# Patient Record
Sex: FEMALE | Race: BLACK OR AFRICAN AMERICAN | NOT HISPANIC OR LATINO | Employment: FULL TIME | URBAN - METROPOLITAN AREA
[De-identification: names, ages, dates, MRNs, and addresses within clinical notes are randomized per-mention and may not be internally consistent; named-entity substitution may affect disease eponyms.]

---

## 2017-05-08 ENCOUNTER — HOSPITAL ENCOUNTER (EMERGENCY)
Facility: HOSPITAL | Age: 22
Discharge: HOME | End: 2017-05-09
Attending: EMERGENCY MEDICINE
Payer: MEDICARE

## 2017-05-08 VITALS
WEIGHT: 125 LBS | HEART RATE: 75 BPM | SYSTOLIC BLOOD PRESSURE: 114 MMHG | RESPIRATION RATE: 18 BRPM | OXYGEN SATURATION: 100 % | TEMPERATURE: 98 F | DIASTOLIC BLOOD PRESSURE: 62 MMHG

## 2017-05-08 DIAGNOSIS — N39.0 UTI (URINARY TRACT INFECTION): Primary | ICD-10-CM

## 2017-05-08 LAB
BILIRUB UR QL STRIP: NEGATIVE
CLARITY UR: CLEAR
COLOR UR: YELLOW
COLOR, POC: NORMAL
GLUCOSE UR STRIP-MCNC: NEGATIVE MG/DL
HCG UR QL: NORMAL
HGB UR QL STRIP.AUTO: ABNORMAL
KETONES UR STRIP-MCNC: NEGATIVE MG/DL
LEUKOCYTE ESTERASE UR QL STRIP: ABNORMAL
NITRITE UR QL STRIP: NEGATIVE
PH UR STRIP.AUTO: 6 [PH] (ref 4.5–8)
PROT UR STRIP-MCNC: NEGATIVE MG/DL
SP GR UR STRIP.AUTO: >=1.03 (ref 1–1.03)
UROBILINOGEN UR QL STRIP.AUTO: 1 E.U./DL

## 2017-05-08 PROCEDURE — 81002 URINALYSIS NONAUTO W/O SCOPE: CPT | Performed by: EMERGENCY MEDICINE

## 2017-05-08 PROCEDURE — 81025 URINE PREGNANCY TEST: CPT | Performed by: EMERGENCY MEDICINE

## 2017-05-09 LAB
BACTERIA UR QL AUTO: ABNORMAL /HPF
HYALINE CASTS #/AREA URNS LPF: ABNORMAL /LPF
NON-SQ EPI CELLS URNS QL MICRO: ABNORMAL /HPF
RBC #/AREA URNS AUTO: ABNORMAL /HPF
WBC #/AREA URNS AUTO: ABNORMAL /HPF

## 2017-05-09 PROCEDURE — 81001 URINALYSIS AUTO W/SCOPE: CPT

## 2017-05-09 PROCEDURE — 87086 URINE CULTURE/COLONY COUNT: CPT

## 2017-05-09 PROCEDURE — 99283 EMERGENCY DEPT VISIT LOW MDM: CPT

## 2017-05-09 PROCEDURE — 87077 CULTURE AEROBIC IDENTIFY: CPT

## 2017-05-09 PROCEDURE — 87186 SC STD MICRODIL/AGAR DIL: CPT

## 2017-05-09 RX ORDER — CEPHALEXIN 500 MG/1
500 CAPSULE ORAL 4 TIMES DAILY
Qty: 20 CAPSULE | Refills: 0 | Status: SHIPPED | OUTPATIENT
Start: 2017-05-09 | End: 2017-05-14

## 2017-05-11 ENCOUNTER — HOSPITAL ENCOUNTER (EMERGENCY)
Facility: HOSPITAL | Age: 22
Discharge: HOME | End: 2017-05-12
Attending: EMERGENCY MEDICINE
Payer: MEDICARE

## 2017-05-11 VITALS
DIASTOLIC BLOOD PRESSURE: 81 MMHG | TEMPERATURE: 97.5 F | SYSTOLIC BLOOD PRESSURE: 135 MMHG | HEART RATE: 107 BPM | RESPIRATION RATE: 18 BRPM | OXYGEN SATURATION: 100 % | WEIGHT: 124 LBS

## 2017-05-11 DIAGNOSIS — S61.411A LACERATION OF RIGHT HAND: Primary | ICD-10-CM

## 2017-05-11 LAB — BACTERIA UR CULT: NORMAL

## 2017-05-11 RX ORDER — LIDOCAINE HYDROCHLORIDE 10 MG/ML
10 INJECTION, SOLUTION EPIDURAL; INFILTRATION; INTRACAUDAL; PERINEURAL ONCE
Status: COMPLETED | OUTPATIENT
Start: 2017-05-12 | End: 2017-05-12

## 2017-05-11 RX ORDER — BACITRACIN, NEOMYCIN, POLYMYXIN B 400; 3.5; 5 [USP'U]/G; MG/G; [USP'U]/G
1 OINTMENT TOPICAL ONCE
Status: COMPLETED | OUTPATIENT
Start: 2017-05-12 | End: 2017-05-12

## 2017-05-11 RX ORDER — NAPROXEN 500 MG/1
500 TABLET ORAL ONCE
Status: COMPLETED | OUTPATIENT
Start: 2017-05-12 | End: 2017-05-12

## 2017-05-12 ENCOUNTER — APPOINTMENT (EMERGENCY)
Dept: RADIOLOGY | Facility: HOSPITAL | Age: 22
End: 2017-05-12
Payer: MEDICARE

## 2017-05-12 ENCOUNTER — HOSPITAL ENCOUNTER (EMERGENCY)
Facility: HOSPITAL | Age: 22
Discharge: HOME | End: 2017-05-12
Attending: EMERGENCY MEDICINE | Admitting: EMERGENCY MEDICINE
Payer: MEDICARE

## 2017-05-12 VITALS
SYSTOLIC BLOOD PRESSURE: 116 MMHG | DIASTOLIC BLOOD PRESSURE: 72 MMHG | RESPIRATION RATE: 16 BRPM | HEART RATE: 77 BPM | TEMPERATURE: 98.4 F | OXYGEN SATURATION: 98 %

## 2017-05-12 DIAGNOSIS — Z51.89 VISIT FOR WOUND CHECK: Primary | ICD-10-CM

## 2017-05-12 PROCEDURE — 99284 EMERGENCY DEPT VISIT MOD MDM: CPT

## 2017-05-12 PROCEDURE — 73130 X-RAY EXAM OF HAND: CPT

## 2017-05-12 PROCEDURE — 99282 EMERGENCY DEPT VISIT SF MDM: CPT

## 2017-05-12 RX ORDER — NAPROXEN 500 MG/1
500 TABLET ORAL 2 TIMES DAILY WITH MEALS
Qty: 20 TABLET | Refills: 0 | Status: SHIPPED | OUTPATIENT
Start: 2017-05-12 | End: 2017-06-29 | Stop reason: ALTCHOICE

## 2017-05-12 RX ORDER — CEPHALEXIN 500 MG/1
500 CAPSULE ORAL 4 TIMES DAILY
Qty: 20 CAPSULE | Refills: 0 | Status: SHIPPED | OUTPATIENT
Start: 2017-05-12 | End: 2017-05-17

## 2017-05-12 RX ADMIN — BACITRACIN, NEOMYCIN, POLYMYXIN B 1 SMALL APPLICATION: 400; 3.5; 5 OINTMENT TOPICAL at 01:14

## 2017-05-12 RX ADMIN — NAPROXEN 500 MG: 500 TABLET ORAL at 01:15

## 2017-05-12 RX ADMIN — LIDOCAINE HYDROCHLORIDE 10 ML: 10 INJECTION, SOLUTION EPIDURAL; INFILTRATION; INTRACAUDAL; PERINEURAL at 00:20

## 2017-06-02 ENCOUNTER — HOSPITAL ENCOUNTER (EMERGENCY)
Facility: HOSPITAL | Age: 22
Discharge: HOME | End: 2017-06-02
Admitting: EMERGENCY MEDICINE
Payer: COMMERCIAL

## 2017-06-02 VITALS
WEIGHT: 124 LBS | HEART RATE: 73 BPM | HEIGHT: 61 IN | DIASTOLIC BLOOD PRESSURE: 60 MMHG | TEMPERATURE: 97.1 F | RESPIRATION RATE: 16 BRPM | SYSTOLIC BLOOD PRESSURE: 111 MMHG | OXYGEN SATURATION: 99 % | BODY MASS INDEX: 23.41 KG/M2

## 2017-06-02 DIAGNOSIS — Z48.02 VISIT FOR SUTURE REMOVAL: Primary | ICD-10-CM

## 2017-06-02 PROCEDURE — 99281 EMR DPT VST MAYX REQ PHY/QHP: CPT

## 2017-06-29 ENCOUNTER — HOSPITAL ENCOUNTER (EMERGENCY)
Facility: HOSPITAL | Age: 22
Discharge: HOME | End: 2017-06-29
Admitting: EMERGENCY MEDICINE
Payer: COMMERCIAL

## 2017-06-29 VITALS
TEMPERATURE: 97.4 F | DIASTOLIC BLOOD PRESSURE: 80 MMHG | WEIGHT: 120 LBS | SYSTOLIC BLOOD PRESSURE: 116 MMHG | RESPIRATION RATE: 16 BRPM | BODY MASS INDEX: 22.67 KG/M2 | OXYGEN SATURATION: 98 % | HEART RATE: 86 BPM

## 2017-06-29 DIAGNOSIS — R19.7 VOMITING AND DIARRHEA: Primary | ICD-10-CM

## 2017-06-29 DIAGNOSIS — R11.10 VOMITING AND DIARRHEA: Primary | ICD-10-CM

## 2017-06-29 LAB
ALBUMIN SERPL BCP-MCNC: 4.2 G/DL (ref 3.5–5)
ALP SERPL-CCNC: 47 U/L (ref 46–116)
ALT SERPL W P-5'-P-CCNC: 18 U/L (ref 12–78)
ANION GAP SERPL CALCULATED.3IONS-SCNC: 6 MMOL/L (ref 4–13)
AST SERPL W P-5'-P-CCNC: 12 U/L (ref 5–45)
BACTERIA UR QL AUTO: ABNORMAL /HPF
BASOPHILS # BLD AUTO: 0.03 THOUSANDS/ΜL (ref 0–0.1)
BASOPHILS NFR BLD AUTO: 0 % (ref 0–1)
BILIRUB SERPL-MCNC: 0.42 MG/DL (ref 0.2–1)
BILIRUB UR QL STRIP: ABNORMAL
BUN SERPL-MCNC: 11 MG/DL (ref 5–25)
CALCIUM SERPL-MCNC: 9.5 MG/DL (ref 8.3–10.1)
CHLORIDE SERPL-SCNC: 106 MMOL/L (ref 100–108)
CLARITY UR: ABNORMAL
CO2 SERPL-SCNC: 26 MMOL/L (ref 21–32)
COLOR UR: YELLOW
COLOR, POC: NORMAL
CREAT SERPL-MCNC: 0.95 MG/DL (ref 0.6–1.3)
EOSINOPHIL # BLD AUTO: 0.12 THOUSAND/ΜL (ref 0–0.61)
EOSINOPHIL NFR BLD AUTO: 2 % (ref 0–6)
ERYTHROCYTE [DISTWIDTH] IN BLOOD BY AUTOMATED COUNT: 12.2 % (ref 11.6–15.1)
GFR SERPL CREATININE-BSD FRML MDRD: >60 ML/MIN/1.73SQ M
GLUCOSE SERPL-MCNC: 86 MG/DL (ref 65–140)
GLUCOSE UR STRIP-MCNC: NEGATIVE MG/DL
HCG UR QL: NEGATIVE
HCT VFR BLD AUTO: 39 % (ref 34.8–46.1)
HGB BLD-MCNC: 13.5 G/DL (ref 11.5–15.4)
HGB UR QL STRIP.AUTO: ABNORMAL
HYALINE CASTS #/AREA URNS LPF: ABNORMAL /LPF
KETONES UR STRIP-MCNC: NEGATIVE MG/DL
LEUKOCYTE ESTERASE UR QL STRIP: NEGATIVE
LIPASE SERPL-CCNC: 83 U/L (ref 73–393)
LYMPHOCYTES # BLD AUTO: 1.78 THOUSANDS/ΜL (ref 0.6–4.47)
LYMPHOCYTES NFR BLD AUTO: 24 % (ref 14–44)
MCH RBC QN AUTO: 30.3 PG (ref 26.8–34.3)
MCHC RBC AUTO-ENTMCNC: 34.6 G/DL (ref 31.4–37.4)
MCV RBC AUTO: 88 FL (ref 82–98)
MONOCYTES # BLD AUTO: 0.62 THOUSAND/ΜL (ref 0.17–1.22)
MONOCYTES NFR BLD AUTO: 8 % (ref 4–12)
NEUTROPHILS # BLD AUTO: 4.8 THOUSANDS/ΜL (ref 1.85–7.62)
NEUTS SEG NFR BLD AUTO: 66 % (ref 43–75)
NITRITE UR QL STRIP: NEGATIVE
NON-SQ EPI CELLS URNS QL MICRO: ABNORMAL /HPF
NRBC BLD AUTO-RTO: 0 /100 WBCS
PH UR STRIP.AUTO: 5.5 [PH] (ref 4.5–8)
PLATELET # BLD AUTO: 283 THOUSANDS/UL (ref 149–390)
PMV BLD AUTO: 10.6 FL (ref 8.9–12.7)
POTASSIUM SERPL-SCNC: 3.6 MMOL/L (ref 3.5–5.3)
PROT SERPL-MCNC: 8.8 G/DL (ref 6.4–8.2)
PROT UR STRIP-MCNC: ABNORMAL MG/DL
RBC # BLD AUTO: 4.45 MILLION/UL (ref 3.81–5.12)
RBC #/AREA URNS AUTO: ABNORMAL /HPF
SODIUM SERPL-SCNC: 138 MMOL/L (ref 136–145)
SP GR UR STRIP.AUTO: >=1.03 (ref 1–1.03)
UROBILINOGEN UR QL STRIP.AUTO: 0.2 E.U./DL
WBC # BLD AUTO: 7.37 THOUSAND/UL (ref 4.31–10.16)
WBC #/AREA URNS AUTO: ABNORMAL /HPF

## 2017-06-29 PROCEDURE — 83690 ASSAY OF LIPASE: CPT | Performed by: PHYSICIAN ASSISTANT

## 2017-06-29 PROCEDURE — 36415 COLL VENOUS BLD VENIPUNCTURE: CPT | Performed by: PHYSICIAN ASSISTANT

## 2017-06-29 PROCEDURE — 99283 EMERGENCY DEPT VISIT LOW MDM: CPT

## 2017-06-29 PROCEDURE — 96361 HYDRATE IV INFUSION ADD-ON: CPT

## 2017-06-29 PROCEDURE — 81001 URINALYSIS AUTO W/SCOPE: CPT

## 2017-06-29 PROCEDURE — 96374 THER/PROPH/DIAG INJ IV PUSH: CPT

## 2017-06-29 PROCEDURE — 81002 URINALYSIS NONAUTO W/O SCOPE: CPT | Performed by: PHYSICIAN ASSISTANT

## 2017-06-29 PROCEDURE — 80053 COMPREHEN METABOLIC PANEL: CPT | Performed by: PHYSICIAN ASSISTANT

## 2017-06-29 PROCEDURE — 81025 URINE PREGNANCY TEST: CPT | Performed by: PHYSICIAN ASSISTANT

## 2017-06-29 PROCEDURE — 85025 COMPLETE CBC W/AUTO DIFF WBC: CPT | Performed by: PHYSICIAN ASSISTANT

## 2017-06-29 RX ORDER — ONDANSETRON 2 MG/ML
4 INJECTION INTRAMUSCULAR; INTRAVENOUS ONCE
Status: COMPLETED | OUTPATIENT
Start: 2017-06-29 | End: 2017-06-29

## 2017-06-29 RX ORDER — ONDANSETRON 4 MG/1
4 TABLET, ORALLY DISINTEGRATING ORAL EVERY 6 HOURS PRN
Qty: 5 TABLET | Refills: 0 | Status: SHIPPED | OUTPATIENT
Start: 2017-06-29 | End: 2017-08-03

## 2017-06-29 RX ADMIN — ONDANSETRON 4 MG: 2 INJECTION INTRAMUSCULAR; INTRAVENOUS at 12:17

## 2017-06-29 RX ADMIN — SODIUM CHLORIDE 500 ML: 0.9 INJECTION, SOLUTION INTRAVENOUS at 12:14

## 2017-08-03 ENCOUNTER — HOSPITAL ENCOUNTER (EMERGENCY)
Facility: HOSPITAL | Age: 22
Discharge: HOME | End: 2017-08-03
Attending: EMERGENCY MEDICINE | Admitting: EMERGENCY MEDICINE
Payer: COMMERCIAL

## 2017-08-03 VITALS
BODY MASS INDEX: 22.73 KG/M2 | WEIGHT: 120.37 LBS | HEIGHT: 61 IN | HEART RATE: 70 BPM | RESPIRATION RATE: 18 BRPM | SYSTOLIC BLOOD PRESSURE: 113 MMHG | OXYGEN SATURATION: 99 % | TEMPERATURE: 98.1 F | DIASTOLIC BLOOD PRESSURE: 70 MMHG

## 2017-08-03 DIAGNOSIS — O20.0 THREATENED MISCARRIAGE: Primary | ICD-10-CM

## 2017-08-03 DIAGNOSIS — O20.0 THREATENED ABORTION: ICD-10-CM

## 2017-08-03 LAB — B-HCG SERPL-ACNC: 3926 MIU/ML

## 2017-08-03 PROCEDURE — 36415 COLL VENOUS BLD VENIPUNCTURE: CPT | Performed by: EMERGENCY MEDICINE

## 2017-08-03 PROCEDURE — 99284 EMERGENCY DEPT VISIT MOD MDM: CPT

## 2017-08-03 PROCEDURE — 84702 CHORIONIC GONADOTROPIN TEST: CPT | Performed by: EMERGENCY MEDICINE

## 2017-08-07 ENCOUNTER — GENERIC CONVERSION - ENCOUNTER (OUTPATIENT)
Dept: OTHER | Facility: OTHER | Age: 22
End: 2017-08-07

## 2017-08-10 ENCOUNTER — GENERIC CONVERSION - ENCOUNTER (OUTPATIENT)
Dept: OTHER | Facility: OTHER | Age: 22
End: 2017-08-10

## 2017-08-16 ENCOUNTER — APPOINTMENT (OUTPATIENT)
Dept: LAB | Facility: CLINIC | Age: 22
End: 2017-08-16
Payer: COMMERCIAL

## 2017-08-16 DIAGNOSIS — O20.0 THREATENED ABORTION: ICD-10-CM

## 2017-08-16 PROCEDURE — 84702 CHORIONIC GONADOTROPIN TEST: CPT

## 2017-08-16 PROCEDURE — 36415 COLL VENOUS BLD VENIPUNCTURE: CPT

## 2017-08-18 ENCOUNTER — TRANSCRIBE ORDERS (OUTPATIENT)
Dept: LAB | Facility: CLINIC | Age: 22
End: 2017-08-18

## 2017-08-18 ENCOUNTER — LAB REQUISITION (OUTPATIENT)
Dept: LAB | Facility: HOSPITAL | Age: 22
End: 2017-08-18
Payer: COMMERCIAL

## 2017-08-18 ENCOUNTER — APPOINTMENT (OUTPATIENT)
Dept: LAB | Facility: CLINIC | Age: 22
End: 2017-08-18
Payer: COMMERCIAL

## 2017-08-18 DIAGNOSIS — O20.0 THREATENED ABORTION: ICD-10-CM

## 2017-08-18 DIAGNOSIS — O20.0 ABORTION, THREATENED: Primary | ICD-10-CM

## 2017-08-18 DIAGNOSIS — Z20.01 CONTACT WITH OR EXPOSURE TO ESCHERICHIA COLI (E. COLI): Primary | ICD-10-CM

## 2017-08-18 LAB
ABO GROUP BLD: NORMAL
B-HCG SERPL-ACNC: 546 MIU/ML
BLD GP AB SCN SERPL QL: POSITIVE
RH BLD: NEGATIVE
SPECIMEN EXPIRATION DATE: NORMAL

## 2017-08-18 PROCEDURE — 86900 BLOOD TYPING SEROLOGIC ABO: CPT | Performed by: NURSE PRACTITIONER

## 2017-08-18 PROCEDURE — 86850 RBC ANTIBODY SCREEN: CPT | Performed by: NURSE PRACTITIONER

## 2017-08-18 PROCEDURE — 84702 CHORIONIC GONADOTROPIN TEST: CPT

## 2017-08-18 PROCEDURE — 36415 COLL VENOUS BLD VENIPUNCTURE: CPT

## 2017-08-18 PROCEDURE — 86870 RBC ANTIBODY IDENTIFICATION: CPT

## 2017-08-18 PROCEDURE — 86901 BLOOD TYPING SEROLOGIC RH(D): CPT | Performed by: NURSE PRACTITIONER

## 2017-08-21 ENCOUNTER — GENERIC CONVERSION - ENCOUNTER (OUTPATIENT)
Dept: OTHER | Facility: OTHER | Age: 22
End: 2017-08-21

## 2017-08-23 ENCOUNTER — GENERIC CONVERSION - ENCOUNTER (OUTPATIENT)
Dept: OTHER | Facility: OTHER | Age: 22
End: 2017-08-23

## 2017-08-23 LAB — BLOOD GROUP ANTIBODIES SERPL: NORMAL

## 2018-01-10 NOTE — MISCELLANEOUS
To Whom it May Concern: The above named patient is following with us for her care  Please forward records to us from her most recent Emergency Room appointment on August 7th  Thank you for your prompt attention in this matter      Sincerely,    [facility], OB-Gyn

## 2018-01-15 NOTE — RESULT NOTES
Verified Results  (1) HCG QUANT [id number] 09:49AM Laquita Lopez     Test Name Result Flag Reference   HCG QUANTITATIVE 546 mIU/mL H <=6   Expected Ranges:     Approximate               Approximate HCG  Gestation age          Concentration ( mIU/mL)  _____________          ______________________   April [facility]                      HCG values  0 2-1                       5-50  1-2                           2-3                         100-5000  3-4                         [id number]  4-5                         1000-75832  5-6                         [id number]  6-8                         [id number]  8-12                        [id number]

## 2018-01-22 VITALS
DIASTOLIC BLOOD PRESSURE: 70 MMHG | WEIGHT: 123.56 LBS | SYSTOLIC BLOOD PRESSURE: 118 MMHG | HEART RATE: 94 BPM | BODY MASS INDEX: 23.35 KG/M2

## 2018-02-27 ENCOUNTER — APPOINTMENT (OUTPATIENT)
Dept: LAB | Facility: CLINIC | Age: 23
End: 2018-02-27
Payer: COMMERCIAL

## 2018-02-27 ENCOUNTER — TRANSCRIBE ORDERS (OUTPATIENT)
Dept: LAB | Facility: CLINIC | Age: 23
End: 2018-02-27

## 2018-02-27 DIAGNOSIS — I10 ESSENTIAL HYPERTENSION, MALIGNANT: Primary | ICD-10-CM

## 2018-02-27 DIAGNOSIS — N91.2 ABSENCE OF MENSTRUATION: ICD-10-CM

## 2018-02-27 LAB
B-HCG SERPL-ACNC: 8031 MIU/ML
PROGEST SERPL-MCNC: 10.9 NG/ML

## 2018-02-27 PROCEDURE — 84144 ASSAY OF PROGESTERONE: CPT

## 2018-02-27 PROCEDURE — 36415 COLL VENOUS BLD VENIPUNCTURE: CPT

## 2018-02-27 PROCEDURE — 84702 CHORIONIC GONADOTROPIN TEST: CPT

## 2018-03-21 ENCOUNTER — INITIAL PRENATAL (OUTPATIENT)
Dept: OBGYN CLINIC | Facility: CLINIC | Age: 23
End: 2018-03-21
Payer: COMMERCIAL

## 2018-03-21 VITALS
HEART RATE: 80 BPM | HEIGHT: 61 IN | SYSTOLIC BLOOD PRESSURE: 109 MMHG | DIASTOLIC BLOOD PRESSURE: 75 MMHG | BODY MASS INDEX: 22.77 KG/M2 | WEIGHT: 120.6 LBS

## 2018-03-21 DIAGNOSIS — Z34.01 ENCOUNTER FOR SUPERVISION OF NORMAL FIRST PREGNANCY, FIRST TRIMESTER: Primary | ICD-10-CM

## 2018-03-21 PROCEDURE — 99213 OFFICE O/P EST LOW 20 MIN: CPT | Performed by: OBSTETRICS & GYNECOLOGY

## 2018-03-21 RX ORDER — PNV NO.95/FERROUS FUM/FOLIC AC 28MG-0.8MG
1 TABLET ORAL DAILY
COMMUNITY
End: 2020-04-14 | Stop reason: SDUPTHER

## 2018-07-05 ENCOUNTER — HOSPITAL ENCOUNTER (EMERGENCY)
Age: 23
Discharge: HOME | End: 2018-07-05
Attending: EMERGENCY MEDICINE
Payer: COMMERCIAL

## 2018-07-05 VITALS
HEIGHT: 62 IN | HEART RATE: 57 BPM | SYSTOLIC BLOOD PRESSURE: 100 MMHG | OXYGEN SATURATION: 100 % | TEMPERATURE: 97.9 F | RESPIRATION RATE: 16 BRPM | WEIGHT: 113.06 LBS | BODY MASS INDEX: 20.8 KG/M2 | DIASTOLIC BLOOD PRESSURE: 60 MMHG

## 2018-07-05 DIAGNOSIS — A59.9 TRICHOMONAS INFECTION: ICD-10-CM

## 2018-07-05 DIAGNOSIS — N39.0 URINARY TRACT INFECTION WITHOUT HEMATURIA, SITE UNSPECIFIED: Primary | ICD-10-CM

## 2018-07-05 LAB
-: ABNORMAL
AMORPHOUS: ABNORMAL
BACTERIA: ABNORMAL
BILIRUBIN URINE: NEGATIVE
CASTS UA: ABNORMAL /LPF
COLOR: YELLOW
COMMENT UA: ABNORMAL
CRYSTALS, UA: ABNORMAL /HPF
EPITHELIAL CELLS UA: ABNORMAL /HPF (ref 0–5)
GLUCOSE URINE: NEGATIVE
KETONES, URINE: NEGATIVE
LEUKOCYTE ESTERASE, URINE: ABNORMAL
MUCUS: ABNORMAL
NITRITE, URINE: NEGATIVE
OTHER OBSERVATIONS UA: ABNORMAL
PH UA: 6 (ref 5–8)
PROTEIN UA: NEGATIVE
RBC UA: ABNORMAL /HPF (ref 0–2)
RENAL EPITHELIAL, UA: ABNORMAL /HPF
SPECIFIC GRAVITY UA: 1.03 (ref 1–1.03)
TRICHOMONAS: POSITIVE
TURBIDITY: ABNORMAL
URINE HGB: NEGATIVE
UROBILINOGEN, URINE: NORMAL
WBC UA: ABNORMAL /HPF (ref 0–5)
YEAST: ABNORMAL

## 2018-07-05 PROCEDURE — 81001 URINALYSIS AUTO W/SCOPE: CPT

## 2018-07-05 PROCEDURE — 84703 CHORIONIC GONADOTROPIN ASSAY: CPT

## 2018-07-05 PROCEDURE — 99283 EMERGENCY DEPT VISIT LOW MDM: CPT

## 2018-07-05 RX ORDER — SULFAMETHOXAZOLE AND TRIMETHOPRIM 800; 160 MG/1; MG/1
1 TABLET ORAL 2 TIMES DAILY
Qty: 20 TABLET | Refills: 0 | Status: SHIPPED | OUTPATIENT
Start: 2018-07-05 | End: 2018-07-15

## 2018-07-05 RX ORDER — METRONIDAZOLE 500 MG/1
500 TABLET ORAL 3 TIMES DAILY
Qty: 21 TABLET | Refills: 0 | Status: SHIPPED | OUTPATIENT
Start: 2018-07-05 | End: 2018-07-12

## 2018-07-05 ASSESSMENT — ENCOUNTER SYMPTOMS
EYES NEGATIVE: 1
RESPIRATORY NEGATIVE: 1
ALLERGIC/IMMUNOLOGIC NEGATIVE: 1
GASTROINTESTINAL NEGATIVE: 1

## 2018-07-05 ASSESSMENT — PAIN SCALES - GENERAL: PAINLEVEL_OUTOF10: 10

## 2018-07-05 ASSESSMENT — PAIN DESCRIPTION - PAIN TYPE: TYPE: ACUTE PAIN

## 2018-07-05 ASSESSMENT — PAIN DESCRIPTION - DESCRIPTORS: DESCRIPTORS: SHARP;STABBING

## 2018-07-05 ASSESSMENT — PAIN DESCRIPTION - LOCATION: LOCATION: FLANK

## 2018-07-05 ASSESSMENT — PAIN DESCRIPTION - ORIENTATION: ORIENTATION: LEFT

## 2018-07-06 LAB — HCG, PREGNANCY URINE (POC): NEGATIVE

## 2018-07-06 NOTE — ED NOTES
Patient presents to ED via private auto c/o left flank pain, lower ab pain and burning when urinating onset 3 days. Afebrile on arrival. VS stable, NAD will continue to monitor.      Alba Lee RN  07/05/18 2424

## 2018-07-06 NOTE — ED PROVIDER NOTES
[address] [place] [address] ED  eMERGENCY dEPARTMENT eNCOUnter      Pt Name: Lea Alvarado  MRN: [id number]  [place] 1995  Date of evaluation: 2018  Provider: Gennaro Cardoza MD    CHIEF COMPLAINT       Chief Complaint   Patient presents with    Dysuria     past 3 days    Flank Pain     lt flank pain past 3 days(hx pyelonephritis)         HISTORY OF PRESENT ILLNESS   (Location/Symptom, Timing/Onset, Context/Setting, Quality, Duration, Modifying Factors, Severity)  Note limiting factors. Lea Alvarado is a 25 y.o. female who presents to the emergency department With complaints of flank pain and increased frequency of micturition. Patient had chills. No fever, no nausea, vomiting, had increased frequency of micturition and burning micturition. No chest pain, no discharge per vagina. No swelling     HPI    Nursing Notes were reviewed. REVIEW OF SYSTEMS    (2-9 systems for level 4, 10 or more for level 5)     Review of Systems   Constitutional: Positive for chills. HENT: Negative. Eyes: Negative. Respiratory: Negative. Cardiovascular: Negative. Gastrointestinal: Negative. Endocrine: Negative. Genitourinary: Positive for dysuria, frequency and urgency. Negative for vaginal discharge. Musculoskeletal: Negative. Skin: Negative. Allergic/Immunologic: Negative. Neurological: Negative. Hematological: Negative. Psychiatric/Behavioral: Negative. All other systems reviewed and are negative. Except as noted above the remainder of the review of systems was reviewed and negative.        PAST MEDICAL HISTORY     Past Medical History:   Diagnosis Date    Allergic     codeine- anaphylaxis    allergy     codeine- anaphylaxis    Pyelonephritis 3/14/2015    Pyelonephritis          SURGICAL HISTORY       Past Surgical History:   Procedure Laterality Date     SECTION      TYMPANOSTOMY TUBE PLACEMENT           CURRENT MEDICATIONS       Previous Medications 97999  [phone]      As needed        Final Impression:   1. Urinary tract infection without hematuria, site unspecified    2.  Trichomonas infection               (Please note that portions of this note were completed with a voice recognition program.  Efforts were made to edit the dictations but occasionally words are mis-transcribed.)    Jose Leung MD (electronically signed)  Attending Emergency Physician            Jose Leung MD  07/05/18 7190

## 2019-01-30 ENCOUNTER — HOSPITAL ENCOUNTER (EMERGENCY)
Age: 24
Discharge: HOME | End: 2019-01-30
Attending: SPECIALIST
Payer: COMMERCIAL

## 2019-01-30 VITALS
OXYGEN SATURATION: 98 % | WEIGHT: 115 LBS | RESPIRATION RATE: 16 BRPM | SYSTOLIC BLOOD PRESSURE: 116 MMHG | TEMPERATURE: 98.1 F | DIASTOLIC BLOOD PRESSURE: 54 MMHG | HEIGHT: 62 IN | BODY MASS INDEX: 21.16 KG/M2 | HEART RATE: 64 BPM

## 2019-01-30 DIAGNOSIS — H66.92 ACUTE LEFT OTITIS MEDIA: Primary | ICD-10-CM

## 2019-01-30 PROCEDURE — 6370000000 HC RX 637 (ALT 250 FOR IP): Performed by: SPECIALIST

## 2019-01-30 PROCEDURE — 99282 EMERGENCY DEPT VISIT SF MDM: CPT

## 2019-01-30 RX ORDER — AMOXICILLIN 250 MG/1
500 CAPSULE ORAL ONCE
Status: COMPLETED | OUTPATIENT
Start: 2019-01-30 | End: 2019-01-30

## 2019-01-30 RX ORDER — IBUPROFEN 600 MG/1
600 TABLET ORAL ONCE
Status: COMPLETED | OUTPATIENT
Start: 2019-01-30 | End: 2019-01-30

## 2019-01-30 RX ORDER — AMOXICILLIN 500 MG/1
500 CAPSULE ORAL 3 TIMES DAILY
Qty: 30 CAPSULE | Refills: 0 | Status: SHIPPED | OUTPATIENT
Start: 2019-01-30 | End: 2019-02-09

## 2019-01-30 RX ORDER — IBUPROFEN 600 MG/1
600 TABLET ORAL EVERY 8 HOURS PRN
Qty: 20 TABLET | Refills: 0 | Status: SHIPPED | OUTPATIENT
Start: 2019-01-30 | End: 2019-04-12

## 2019-01-30 RX ADMIN — IBUPROFEN 600 MG: 600 TABLET ORAL at 07:45

## 2019-01-30 RX ADMIN — AMOXICILLIN 500 MG: 250 CAPSULE ORAL at 07:45

## 2019-01-30 ASSESSMENT — ENCOUNTER SYMPTOMS
SORE THROAT: 1
TROUBLE SWALLOWING: 1
SHORTNESS OF BREATH: 0
DIARRHEA: 0
COUGH: 0
NAUSEA: 0
ABDOMINAL PAIN: 0

## 2019-01-30 ASSESSMENT — PAIN SCALES - GENERAL
PAINLEVEL_OUTOF10: 7
PAINLEVEL_OUTOF10: 7

## 2019-01-30 ASSESSMENT — PAIN DESCRIPTION - LOCATION: LOCATION: EAR

## 2019-01-30 ASSESSMENT — PAIN DESCRIPTION - ORIENTATION: ORIENTATION: LEFT

## 2019-01-30 ASSESSMENT — PAIN DESCRIPTION - PROGRESSION: CLINICAL_PROGRESSION: NOT CHANGED

## 2019-01-31 ENCOUNTER — CARE COORDINATION (OUTPATIENT)
Dept: CARE COORDINATION | Age: 24
End: 2019-01-31

## 2019-03-14 ENCOUNTER — HOSPITAL ENCOUNTER (EMERGENCY)
Age: 24
Discharge: HOME | End: 2019-03-14
Attending: EMERGENCY MEDICINE
Payer: COMMERCIAL

## 2019-03-14 VITALS
DIASTOLIC BLOOD PRESSURE: 76 MMHG | OXYGEN SATURATION: 100 % | HEART RATE: 74 BPM | BODY MASS INDEX: 21.35 KG/M2 | WEIGHT: 116 LBS | HEIGHT: 62 IN | SYSTOLIC BLOOD PRESSURE: 123 MMHG | TEMPERATURE: 98.6 F | RESPIRATION RATE: 18 BRPM

## 2019-03-14 DIAGNOSIS — N39.0 URINARY TRACT INFECTION WITHOUT HEMATURIA, SITE UNSPECIFIED: Primary | ICD-10-CM

## 2019-03-14 LAB
-: ABNORMAL
AMORPHOUS: ABNORMAL
BACTERIA: ABNORMAL
BILIRUBIN URINE: NEGATIVE
CASTS UA: ABNORMAL /LPF
COLOR: YELLOW
COMMENT UA: ABNORMAL
CRYSTALS, UA: ABNORMAL /HPF
EPITHELIAL CELLS UA: ABNORMAL /HPF (ref 0–5)
GLUCOSE URINE: NEGATIVE
HCG(URINE) PREGNANCY TEST: NEGATIVE
KETONES, URINE: NEGATIVE
LEUKOCYTE ESTERASE, URINE: ABNORMAL
MUCUS: ABNORMAL
NITRITE, URINE: NEGATIVE
OTHER OBSERVATIONS UA: ABNORMAL
PH UA: 7 (ref 5–8)
PROTEIN UA: NEGATIVE
RBC UA: ABNORMAL /HPF (ref 0–2)
RENAL EPITHELIAL, UA: ABNORMAL /HPF
SPECIFIC GRAVITY UA: 1.02 (ref 1–1.03)
TRICHOMONAS: ABNORMAL
TURBIDITY: CLEAR
URINE HGB: NEGATIVE
UROBILINOGEN, URINE: NORMAL
WBC UA: ABNORMAL /HPF (ref 0–5)
YEAST: ABNORMAL

## 2019-03-14 PROCEDURE — 84703 CHORIONIC GONADOTROPIN ASSAY: CPT

## 2019-03-14 PROCEDURE — 99283 EMERGENCY DEPT VISIT LOW MDM: CPT

## 2019-03-14 PROCEDURE — 81001 URINALYSIS AUTO W/SCOPE: CPT

## 2019-03-14 PROCEDURE — 87086 URINE CULTURE/COLONY COUNT: CPT

## 2019-03-14 RX ORDER — SULFAMETHOXAZOLE AND TRIMETHOPRIM 800; 160 MG/1; MG/1
1 TABLET ORAL 2 TIMES DAILY
Qty: 20 TABLET | Refills: 0 | Status: SHIPPED | OUTPATIENT
Start: 2019-03-14 | End: 2019-03-24

## 2019-03-14 ASSESSMENT — ENCOUNTER SYMPTOMS
SHORTNESS OF BREATH: 0
BACK PAIN: 1
COUGH: 0
DIARRHEA: 0
EYE DISCHARGE: 0
VOMITING: 1
EYE REDNESS: 0
SORE THROAT: 0
ABDOMINAL PAIN: 1

## 2019-03-14 ASSESSMENT — PAIN DESCRIPTION - LOCATION: LOCATION: ABDOMEN;BACK

## 2019-03-14 ASSESSMENT — PAIN DESCRIPTION - DESCRIPTORS: DESCRIPTORS: SQUEEZING

## 2019-03-14 ASSESSMENT — PAIN SCALES - GENERAL: PAINLEVEL_OUTOF10: 4

## 2019-03-14 ASSESSMENT — PAIN DESCRIPTION - PAIN TYPE: TYPE: ACUTE PAIN

## 2019-03-15 ENCOUNTER — CARE COORDINATION (OUTPATIENT)
Dept: CARE COORDINATION | Age: 24
End: 2019-03-15

## 2019-03-16 LAB
CULTURE: NORMAL
Lab: NORMAL
SPECIMEN DESCRIPTION: NORMAL

## 2019-04-12 ENCOUNTER — HOSPITAL ENCOUNTER (EMERGENCY)
Age: 24
Discharge: HOME | End: 2019-04-12
Attending: EMERGENCY MEDICINE
Payer: COMMERCIAL

## 2019-04-12 VITALS
RESPIRATION RATE: 16 BRPM | SYSTOLIC BLOOD PRESSURE: 110 MMHG | OXYGEN SATURATION: 100 % | HEART RATE: 88 BPM | BODY MASS INDEX: 21.16 KG/M2 | WEIGHT: 115 LBS | TEMPERATURE: 98.2 F | HEIGHT: 62 IN | DIASTOLIC BLOOD PRESSURE: 68 MMHG

## 2019-04-12 DIAGNOSIS — J02.9 ACUTE PHARYNGITIS, UNSPECIFIED ETIOLOGY: Primary | ICD-10-CM

## 2019-04-12 LAB
DIRECT EXAM: NORMAL
DIRECT EXAM: NORMAL
Lab: NORMAL
Lab: NORMAL
SPECIMEN DESCRIPTION: NORMAL
SPECIMEN DESCRIPTION: NORMAL

## 2019-04-12 PROCEDURE — 87651 STREP A DNA AMP PROBE: CPT

## 2019-04-12 PROCEDURE — 99283 EMERGENCY DEPT VISIT LOW MDM: CPT

## 2019-04-12 RX ORDER — PREDNISONE 10 MG/1
TABLET ORAL
Qty: 20 TABLET | Refills: 0 | Status: SHIPPED | OUTPATIENT
Start: 2019-04-12 | End: 2019-04-22

## 2019-04-12 ASSESSMENT — PAIN DESCRIPTION - PAIN TYPE: TYPE: ACUTE PAIN

## 2019-04-12 ASSESSMENT — PAIN DESCRIPTION - LOCATION: LOCATION: THROAT

## 2019-04-12 ASSESSMENT — PAIN SCALES - GENERAL: PAINLEVEL_OUTOF10: 5

## 2019-04-12 NOTE — ED PROVIDER NOTES
[facility] ED  [address] [address]  Phone: [phone]  Fax: [phone]      Pt Name: Carlita Lazo  CMP:[id number]  [place] 1995  Date of evaluation: 2019      CHIEF COMPLAINT       Chief Complaint   Patient presents with    Pharyngitis       HISTORY OF PRESENT ILLNESS    27-year-old female presents to the emergency department today complaining of a three-day history of sore throat. Pain radiates to both ears. She doesn't think she's had fever or chills. No nasal congestion or cough. No known sick contacts. Swallowing makes the pain worse. Nothing makes pain better. There's been no other content redness evaluation or management of these symptoms prior to arrival.     REVIEWOF SYSTEMS     Review of Systems   All other systems reviewed and are negative. PAST MEDICAL HISTORY    has a past medical history of Allergic, allergy, Pyelonephritis, and Pyelonephritis. SURGICAL HISTORY      has a past surgical history that includes Tympanostomy tube placement and  section. CURRENTMEDICATIONS       Previous Medications    No medications on file       ALLERGIES     has No Known Allergies. FAMILY HISTORY     indicated that her mother is alive. She indicated that her father is alive. She indicated that her brother is alive. family history is not on file. SOCIAL HISTORY      reports that she has never smoked. She has never used smokeless tobacco. She reports that she does not drink alcohol or use drugs. PHYSICAL EXAM     INITIAL VITALS:  height is 5' 2\" (1.575 m) and weight is 52.2 kg (115 lb). Her oral temperature is 98.2 °F (36.8 °C). Her blood pressure is 110/68 and her pulse is 88. Her respiration is 16 and oxygen saturation is 100%. Physical Exam   Constitutional: She is oriented to person, place, and time. She appears well-developed and well-nourished. No distress. HENT:   Head: Normocephalic and atraumatic.    Right Ear: External ear normal.   Left Ear: External ear normal.   Mouth/Throat: Oropharynx is clear and moist. No oropharyngeal exudate. Eyes: Pupils are equal, round, and reactive to light. Conjunctivae and EOM are normal.   Neck: Normal range of motion. Neck supple. No tracheal deviation present. Cardiovascular: Normal rate, regular rhythm and intact distal pulses. Pulmonary/Chest: Effort normal and breath sounds normal. No respiratory distress. Musculoskeletal: Normal range of motion. She exhibits no edema or tenderness. Neurological: She is alert and oriented to person, place, and time. Skin: Skin is warm and dry. Psychiatric: She has a normal mood and affect. Her behavior is normal. Judgment and thought content normal.   Vitals reviewed. MDM:   Throat is normal-appearing to me. Patient is managing her airway and secretions without difficulty. Results for orders placed or performed during the hospital encounter of 04/12/19   Strep Screen Group A Throat   Result Value Ref Range    Specimen Description . THROAT     Special Requests NOT REPORTED     Direct Exam       Rapid Strep A negative. A negative Rapid Group A Strep Screen result does not rule out the possibility of Group A Streptococci in the specimen. A Group A Strep DNA test is available upon request.       I will treat this patient supportively. Work note has been written. The patient was given the following medications:  Orders Placed This Encounter   Medications    predniSONE (DELTASONE) 10 MG tablet     Sig: Take 4 tablets by mouth once daily for 5 days     Dispense:  20 tablet     Refill:  0          FINAL IMPRESSION      1.  Acute pharyngitis, unspecified etiology          DISPOSITION/PLAN   DISPOSITION Decision To Discharge 04/12/2019 07:13:12 AM      Condition on Disposition  Good    PATIENT REFERRED TO:  Shawna Yoo DO  [address] [place] [address] 22.  [phone]    Schedule an appointment as soon as possible for a visit in 1 week        DISCHARGE MEDICATIONS:  New Prescriptions    PREDNISONE (DELTASONE) 10 MG TABLET    Take 4 tablets by mouth once daily for 5 days       (Please note that portions of this note werecompleted with a voice recognition program.  Efforts were made to edit the dictations but occasionally words are mis-transcribed.)    Danielle Amos MD, F.A.C.E.P, F.A.A.E.M  Emergency Physician Attending          Danielle Amos MD  04/12/19 0376

## 2019-04-12 NOTE — ED NOTES
PATIENT TO TREATMENT ROOM AMBULATES WITHOUT DIFFICULTY, GAIT SLOW AND STEADY, RESP = AND NON LABORED. SPELLING OF FIRST AND LAST NAME AND  VERIFIED WITH PATIENT. PATIENT PRESENTS TO ED WITH C/O SORE THROAT X 2 DAYS WITH DIFFICULTY SWALLOWING. PT C/O PAIN RADIATING INTO BILAT EARS. NO FEVER.  DENIES COUGH  SEE [address] [facility] Neetuelroy Crowley RN  [phone]

## 2019-05-04 ENCOUNTER — HOSPITAL ENCOUNTER (EMERGENCY)
Age: 24
Discharge: HOME | End: 2019-05-04
Attending: EMERGENCY MEDICINE
Payer: COMMERCIAL

## 2019-05-04 VITALS
TEMPERATURE: 98.4 F | DIASTOLIC BLOOD PRESSURE: 73 MMHG | OXYGEN SATURATION: 97 % | HEIGHT: 62 IN | RESPIRATION RATE: 20 BRPM | SYSTOLIC BLOOD PRESSURE: 115 MMHG | HEART RATE: 71 BPM | BODY MASS INDEX: 21.71 KG/M2 | WEIGHT: 118 LBS

## 2019-05-04 DIAGNOSIS — K02.9 PAIN DUE TO DENTAL CARIES: Primary | ICD-10-CM

## 2019-05-04 PROCEDURE — 99282 EMERGENCY DEPT VISIT SF MDM: CPT

## 2019-05-04 RX ORDER — PENICILLIN V POTASSIUM 500 MG/1
500 TABLET ORAL 4 TIMES DAILY
Qty: 28 TABLET | Refills: 0 | Status: SHIPPED | OUTPATIENT
Start: 2019-05-04 | End: 2019-05-11

## 2019-05-04 ASSESSMENT — PAIN DESCRIPTION - ORIENTATION: ORIENTATION: RIGHT;UPPER

## 2019-05-04 ASSESSMENT — PAIN DESCRIPTION - LOCATION: LOCATION: JAW

## 2019-05-04 ASSESSMENT — PAIN DESCRIPTION - FREQUENCY: FREQUENCY: CONTINUOUS

## 2019-05-04 ASSESSMENT — PAIN DESCRIPTION - PAIN TYPE: TYPE: ACUTE PAIN

## 2019-05-04 ASSESSMENT — PAIN DESCRIPTION - DESCRIPTORS: DESCRIPTORS: THROBBING

## 2019-05-04 ASSESSMENT — PAIN SCALES - GENERAL: PAINLEVEL_OUTOF10: 8

## 2019-05-04 NOTE — ED PROVIDER NOTES
[facility] ED  [address] [facility] [address]  Phone: [phone]  Fax: [phone]      eMERGENCY dEPARTMENT eNCOUnter      Pt Name: Naomy Hamm  MRN: [id number]  [place] 1995  Date of evaluation: 2019  Provider: Mary Mims PA-C    CHIEF COMPLAINT       Chief Complaint   Patient presents with    Dental Pain           HISTORY OF PRESENT ILLNESS  (Location/Symptom, Timing/Onset, Context/Setting, Quality, Duration, Modifying Factors, Severity.)   Naomy Hamm is a 26 y.o. female who presents to the emergency department for evaluation of DENTAL PAIN. Context/timeframe:   24-hour pain of known  Tooth with dental caries/previous fracture. Patient denies any new trauma or dental fracture prompting this pain. She denies any facial edema/fever/chills/nausea/vomiting/neck pain/headache. Patient states she does see [facility].  She denies any other symptoms. Known caries and/or fractures? YES  Facial swelling? NO  Fever? NO  Vomiting? NO  Neck pain? NO      Nursing Notes were reviewed. REVIEW OF SYSTEMS    (2-9 systems for level 4, 10 or more for level 5)     Review of Systems   Constitutional: Negative. HENT:  Per HPI  Eyes: Negative. Respiratory: Negative. Cardiovascular: Negative. Gastrointestinal: Negative. Musculoskeletal: Negative. Neck: Per HPI  Endocrine: Negative. Genitourinary: Negative. Skin: Negative. Allergic/Immunologic: Negative. Neurological: Negative. Hematological: Negative. Psychiatric/Behavioral: Negative. Except as noted above the remainder of the review of systems was reviewed and negative.        PAST MEDICAL HISTORY         Diagnosis Date    Allergic     codeine- anaphylaxis    allergy     codeine- anaphylaxis    Pyelonephritis 3/14/2015    Pyelonephritis          SURGICAL HISTORY           Procedure Laterality Date     SECTION      TYMPANOSTOMY TUBE PLACEMENT         CURRENT MEDICATIONS Previous Medications    No medications on file       ALLERGIES     Patient has no known allergies. FAMILY HISTORY     History reviewed. No pertinent family history. Family Status   Relation Name Status    Mother  Alive    Father  Alive    Brother  Alive        SOCIAL HISTORY      reports that she has never smoked. She has never used smokeless tobacco. She reports that she does not drink alcohol or use drugs. PHYSICAL EXAM    (up to 7 for level 4, 8 or more for level 5)     ED Triage Vitals [05/04/19 1826]   BP Temp Temp Source Pulse Resp SpO2 Height Weight   115/73 98.4 °F (36.9 °C) Oral 71 20 97 % 5' 2\" (1.575 m) 118 lb (53.5 kg)       Physical Exam   Nursing note and vitals reviewed. Constitutional:  well-developed, well-nourished  Head: Normocephalic and atraumatic. Ear: External ears normal.   Throat/Oral:  #2 dental fractures with mild caries. No oral abscess/bleeding/discharge appreciated. No facial/jaw edema. Nose: Nose normal and midline. No meningismus. Eyes: Conjunctivae, normal. Pupils are equal, round. Neck: No lymphadenopathy. Normal range of motion. Cardiovascular: Normal rate, regular rhythm, normal heart sounds  Pulmonary/Chest: Effort normal and breath sounds normal.  Musculoskeletal: Normal to inspection. Neurological: Alert and appropriate mentation  Skin: Skin is warm and dry. No rash noted. No erythema.    Psychiatric: Mood, memory, affect and judgment normal.         DIAGNOSTIC RESULTS         RADIOLOGY:   Non-plain film images such as CT, Ultrasound and MRI are read by the radiologist. Plain radiographic images are visualized and preliminarily interpreted by the emergency physician with the below findings:    Not indicated    Interpretation per the Radiologist below, if available at the time of this note:    No orders to display           LABS:  Labs Reviewed - No data to display  Not indicated    All other labs were within normal range or not returned as of this dictation. EMERGENCY DEPARTMENT COURSE and DIFFERENTIAL DIAGNOSIS/MDM:   Vitals:    Vitals:    05/04/19 1826   BP: 115/73   Pulse: 71   Resp: 20   Temp: 98.4 °F (36.9 °C)   TempSrc: Oral   SpO2: 97%   Weight: 53.5 kg (118 lb)   Height: 5' 2\" (1.575 m)     [phone]  PCN rx provided, Motrin/Tylenol recommended. CONSULTS:  None    PROCEDURES:  None    I have reviewed the disposition diagnosis with the patient and or their family/guardian. I have answered their questions and given discharge instructions. They voiced understanding of these instructions and did not have any further questions or complaints. FINAL IMPRESSION      1. Pain due to dental caries          DISPOSITION/PLAN   DISPOSITION Decision To Discharge    PATIENT REFERRED TO:  No follow-up provider specified.     DISCHARGE MEDICATIONS:  New Prescriptions    PENICILLIN V POTASSIUM (VEETID) 500 MG TABLET    Take 1 tablet by mouth 4 times daily for 7 days       (Please note that portions of this note were completed with a voice recognition program.  Efforts were made to edit the dictations but occasionally words are mis-transcribed.)    CIRO Molina PA-C  05/04/19 7610

## 2019-05-04 NOTE — ED PROVIDER NOTES
[address] [facility] ED  eMERGENCY dEPARTMENT eNCOUnter   Attending Attestation     Pt Name: Swati Dorado  MRN: [id number]  [place] 1995  Date of evaluation: 5/4/19       Swati Dorado is a 26 y.o. female who presents with Dental Pain      History: Complaining of dental pain. She has a dentist to follow-up with. Exam: Patient is nontoxic in appearance. She has poor dentition and dental cavities to the right upper molars. No trismus. I performed a history and physical examination of the patient and discussed management with the resident. I reviewed the residents note and agree with the documented findings and plan of care. Any areas of disagreement are noted on the chart. I was personally present for the key portions of any procedures. I have documented in the chart those procedures where I was not present during the key portions. I have reviewed the emergency nurses triage note. I agree with the chief complaint, past medical history, past surgical history, allergies, medications, social and family history as documented unless otherwise noted below. Documentation of the HPI, Physical Exam and Medical Decision Making performed by medical students or scribes is based on my personal performance of the HPI, PE and MDM. For Phys Assistant/ Nurse Practitioner cases/documentation I have had a face to face evaluation with this patient and have completed at least one if not all key elements of the E/M (history, physical exam, and MDM). Additional findings are as noted. Damien Khanna D.O.   Attending ER Physician        Damien Khanna DO  05/04/19 1582

## 2019-05-27 ENCOUNTER — HOSPITAL ENCOUNTER (EMERGENCY)
Age: 24
Discharge: HOME | End: 2019-05-27
Attending: EMERGENCY MEDICINE
Payer: COMMERCIAL

## 2019-05-27 VITALS
BODY MASS INDEX: 21.16 KG/M2 | SYSTOLIC BLOOD PRESSURE: 119 MMHG | DIASTOLIC BLOOD PRESSURE: 70 MMHG | OXYGEN SATURATION: 98 % | TEMPERATURE: 98.4 F | RESPIRATION RATE: 14 BRPM | WEIGHT: 115 LBS | HEART RATE: 106 BPM | HEIGHT: 62 IN

## 2019-05-27 DIAGNOSIS — J02.0 STREP PHARYNGITIS: Primary | ICD-10-CM

## 2019-05-27 PROCEDURE — 6370000000 HC RX 637 (ALT 250 FOR IP): Performed by: EMERGENCY MEDICINE

## 2019-05-27 PROCEDURE — 6360000002 HC RX W HCPCS: Performed by: EMERGENCY MEDICINE

## 2019-05-27 PROCEDURE — 96375 TX/PRO/DX INJ NEW DRUG ADDON: CPT

## 2019-05-27 PROCEDURE — 2580000003 HC RX 258: Performed by: EMERGENCY MEDICINE

## 2019-05-27 PROCEDURE — 96374 THER/PROPH/DIAG INJ IV PUSH: CPT

## 2019-05-27 PROCEDURE — 99283 EMERGENCY DEPT VISIT LOW MDM: CPT

## 2019-05-27 PROCEDURE — 96372 THER/PROPH/DIAG INJ SC/IM: CPT

## 2019-05-27 RX ORDER — KETOROLAC TROMETHAMINE 15 MG/ML
15 INJECTION, SOLUTION INTRAMUSCULAR; INTRAVENOUS ONCE
Status: COMPLETED | OUTPATIENT
Start: 2019-05-27 | End: 2019-05-27

## 2019-05-27 RX ORDER — ACETAMINOPHEN 160 MG/5ML
15 SOLUTION ORAL ONCE
Status: DISCONTINUED | OUTPATIENT
Start: 2019-05-27 | End: 2019-05-27 | Stop reason: HOSPADM

## 2019-05-27 RX ORDER — DEXAMETHASONE SODIUM PHOSPHATE 10 MG/ML
10 INJECTION INTRAMUSCULAR; INTRAVENOUS ONCE
Status: COMPLETED | OUTPATIENT
Start: 2019-05-27 | End: 2019-05-27

## 2019-05-27 RX ORDER — 0.9 % SODIUM CHLORIDE 0.9 %
1000 INTRAVENOUS SOLUTION INTRAVENOUS ONCE
Status: COMPLETED | OUTPATIENT
Start: 2019-05-27 | End: 2019-05-27

## 2019-05-27 RX ORDER — ONDANSETRON 2 MG/ML
4 INJECTION INTRAMUSCULAR; INTRAVENOUS ONCE
Status: COMPLETED | OUTPATIENT
Start: 2019-05-27 | End: 2019-05-27

## 2019-05-27 RX ADMIN — PENICILLIN G BENZATHINE 1.2 MILLION UNITS: 1200000 INJECTION, SUSPENSION INTRAMUSCULAR at 13:56

## 2019-05-27 RX ADMIN — SODIUM CHLORIDE 1000 ML: 9 INJECTION, SOLUTION INTRAVENOUS at 12:57

## 2019-05-27 RX ADMIN — KETOROLAC TROMETHAMINE 15 MG: 15 INJECTION, SOLUTION INTRAMUSCULAR; INTRAVENOUS at 12:58

## 2019-05-27 RX ADMIN — ONDANSETRON 4 MG: 2 INJECTION INTRAMUSCULAR; INTRAVENOUS at 12:57

## 2019-05-27 RX ADMIN — DEXAMETHASONE SODIUM PHOSPHATE 10 MG: 10 INJECTION INTRAMUSCULAR; INTRAVENOUS at 12:58

## 2019-05-27 ASSESSMENT — PAIN SCALES - GENERAL
PAINLEVEL_OUTOF10: 10
PAINLEVEL_OUTOF10: 9
PAINLEVEL_OUTOF10: 5

## 2019-05-27 ASSESSMENT — ENCOUNTER SYMPTOMS
SORE THROAT: 1
NAUSEA: 1
TROUBLE SWALLOWING: 1
COUGH: 0

## 2019-05-27 ASSESSMENT — PAIN DESCRIPTION - LOCATION: LOCATION: THROAT

## 2019-05-27 ASSESSMENT — PAIN DESCRIPTION - PROGRESSION
CLINICAL_PROGRESSION: GRADUALLY IMPROVING
CLINICAL_PROGRESSION: GRADUALLY IMPROVING

## 2019-05-27 ASSESSMENT — PAIN - FUNCTIONAL ASSESSMENT: PAIN_FUNCTIONAL_ASSESSMENT: 0-10

## 2019-05-27 NOTE — ED PROVIDER NOTES
family history. SOCIAL HISTORY       Social History     Socioeconomic History    Marital status: Single     Spouse name: None    Number of children: None    Years of education: None    Highest education level: None   Occupational History    None   Social Needs    Financial resource strain: None    Food insecurity:     Worry: None     Inability: None    Transportation needs:     Medical: None     Non-medical: None   Tobacco Use    Smoking status: Never Smoker    Smokeless tobacco: Never Used   Substance and Sexual Activity    Alcohol use: No    Drug use: No    Sexual activity: None   Lifestyle    Physical activity:     Days per week: None     Minutes per session: None    Stress: None   Relationships    Social connections:     Talks on phone: None     Gets together: None     Attends Oriental orthodox service: None     Active member of club or organization: None     Attends meetings of clubs or organizations: None     Relationship status: None    Intimate partner violence:     Fear of current or ex partner: None     Emotionally abused: None     Physically abused: None     Forced sexual activity: None   Other Topics Concern    None   Social History Narrative    None       SCREENINGS             PHYSICAL EXAM    (up to 7 for level 4, 8 or more for level 5)     ED Triage Vitals [05/27/19 1243]   BP Temp Temp Source Pulse Resp SpO2 Height Weight   (!) 71/56 102.9 °F (39.4 °C) Oral 121 16 95 % 5' 2\" (1.575 m) 115 lb (52.2 kg)       Physical Exam   Constitutional: She appears well-developed and well-nourished. No distress. HENT:   Head: Normocephalic and atraumatic. Mouth/Throat: Oropharyngeal exudate present. Significant swelling of tonsils with oropharyngeal exudate   Eyes: Conjunctivae are normal.   Pupils are equally round and reacting normally. Extraoccular muscles are grossly intact. Neck: Normal range of motion. No tracheal deviation present.    Cardiovascular: Regular rhythm, normal heart sounds and intact distal pulses. Exam reveals no friction rub. No murmur heard. Tachycardia noted   Pulmonary/Chest: Effort normal and breath sounds normal. No stridor. No respiratory distress. She has no wheezes. She has no rales. She exhibits no tenderness. Abdominal: Soft. She exhibits no distension and no mass. There is no tenderness. There is no rebound and no guarding. Musculoskeletal: Normal range of motion. She exhibits no edema or deformity. Lymphadenopathy:     She has cervical adenopathy. Neurological: She is alert. Answering questions appropriately. No gaze deficit. No gait abnormality. Moving all extremities. Skin: Skin is warm and dry. Capillary refill takes 2 to 3 seconds. Psychiatric: She has a normal mood and affect. Judgment normal.       EMERGENCY DEPARTMENT COURSE and DIFFERENTIAL DIAGNOSIS/MDM:   Vitals:    Vitals:    05/27/19 1243 05/27/19 1245 05/27/19 1328 05/27/19 1400   BP: (!) 71/56 119/70     Pulse: 121  110 106   Resp: 16  16 14   Temp: 102.9 °F (39.4 °C)  101.7 °F (38.7 °C) 98.4 °F (36.9 °C)   TempSrc: Oral   Oral   SpO2: 95%  98% 98%   Weight: 52.2 kg (115 lb)      Height: 5' 2\" (1.575 m)          Patient presents to the emergency department with the complaint described above. Vital signs show tachycardia, she has significantly swollen tonsils with oropharyngeal exudate. This is streptococcal pharyngitis. We'll put in an IV, give some fluid, IV Toradol and IV Decadron and I'll give her Tylenol liquid by mouth and reevaluate       had fairly significant improvement in the ER, because of her difficulty swallowing I elected to give her intramuscular treatment with Bicillin. She is going be discharged with Tylenol/codeine liquid, follow-up and return to ER information given    At this time the patient is without objective evidence of an acute process requiring hospitalization or inpatient management.  They have remained hemodynamically stable throughout their entire ED visit and are stable for discharge with outpatient follow-up. Standard anticipatory guidance given to patient upon discharge. Have given them a specific time frame in which to follow-up and who to follow-up with. I have also advised them that they should return to the emergency department if they get worse, or not getting better or develop any new or concerning symptoms. Patient demonstrates understanding. PROCEDURES:  Unless otherwise noted below, none     Procedures    FINAL IMPRESSION      1. Strep pharyngitis          DISPOSITION/PLAN   DISPOSITION Decision To Discharge 05/27/2019 02:05:58 PM      PATIENT REFERRED TO:  Pratibha Aguila DO  [address] [place] [address] 22.  [phone]    In 1 week        DISCHARGE MEDICATIONS:  New Prescriptions    ACETAMINOPHEN-CODEINE (CAPITAL/CODEINE) 120-12 MG/5ML SUSPENSION    Take 5 mLs by mouth every 6 hours as needed for Pain for up to 3 days.           (Please note that portions of this note were completed with a voice recognition program.  Efforts were made to edit the dictations but occasionally words are mis-transcribed.)    Chris Zacarias DO (electronically signed)  Board Certified Emergency Physician          Chris Zacarias DO  05/27/19 4999

## 2019-05-27 NOTE — ED NOTES
Pt encouraged to continue attempting to take tylenol. Pt reluctant.      Daniella Hernandez RN  05/27/19 3122

## 2019-08-01 ENCOUNTER — APPOINTMENT (OUTPATIENT)
Dept: CT IMAGING | Age: 24
End: 2019-08-01
Payer: COMMERCIAL

## 2019-08-01 ENCOUNTER — HOSPITAL ENCOUNTER (EMERGENCY)
Age: 24
Discharge: HOME | End: 2019-08-01
Payer: COMMERCIAL

## 2019-08-01 VITALS
RESPIRATION RATE: 16 BRPM | HEIGHT: 62 IN | HEART RATE: 86 BPM | WEIGHT: 112.06 LBS | BODY MASS INDEX: 20.62 KG/M2 | SYSTOLIC BLOOD PRESSURE: 117 MMHG | TEMPERATURE: 98.5 F | OXYGEN SATURATION: 100 % | DIASTOLIC BLOOD PRESSURE: 65 MMHG

## 2019-08-01 DIAGNOSIS — S39.012A STRAIN OF LUMBAR REGION, INITIAL ENCOUNTER: ICD-10-CM
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
## 2019-08-01 LAB
CHP ED QC CHECK: NORMAL
PREGNANCY TEST URINE, POC: NEGATIVE

## 2019-08-01 PROCEDURE — 81025 URINE PREGNANCY TEST: CPT

## 2019-08-01 PROCEDURE — 74176 CT ABD & PELVIS W/O CONTRAST: CPT

## 2019-08-01 PROCEDURE — 99285 EMERGENCY DEPT VISIT HI MDM: CPT

## 2019-08-01 PROCEDURE — 81003 URINALYSIS AUTO W/O SCOPE: CPT

## 2019-08-01 RX ORDER — CYCLOBENZAPRINE HCL 10 MG
10 TABLET ORAL 3 TIMES DAILY PRN
Qty: 30 TABLET | Refills: 0 | Status: SHIPPED | OUTPATIENT
Start: 2019-08-01 | End: 2019-08-11

## 2019-08-01 RX ORDER — IBUPROFEN 600 MG/1
600 TABLET ORAL EVERY 6 HOURS PRN
Qty: 20 TABLET | Refills: 0 | Status: ON HOLD | OUTPATIENT
Start: 2019-08-01 | End: 2019-11-04 | Stop reason: ALTCHOICE

## 2019-08-01 ASSESSMENT — PAIN SCALES - GENERAL: PAINLEVEL_OUTOF10: 10

## 2019-08-02 LAB — HCG, PREGNANCY URINE (POC): NEGATIVE

## 2019-08-02 ASSESSMENT — ENCOUNTER SYMPTOMS
NAUSEA: 0
SHORTNESS OF BREATH: 0
COLOR CHANGE: 0
VOMITING: 0
ABDOMINAL PAIN: 0
SINUS PRESSURE: 0
WHEEZING: 0
RHINORRHEA: 0
SORE THROAT: 0
CONSTIPATION: 0
COUGH: 0
DIARRHEA: 0

## 2019-08-23 ENCOUNTER — HOSPITAL ENCOUNTER (EMERGENCY)
Age: 24
Discharge: HOME | End: 2019-08-23
Attending: EMERGENCY MEDICINE
Payer: COMMERCIAL

## 2019-08-23 ENCOUNTER — APPOINTMENT (OUTPATIENT)
Dept: GENERAL RADIOLOGY | Age: 24
End: 2019-08-23
Payer: COMMERCIAL

## 2019-08-23 VITALS
DIASTOLIC BLOOD PRESSURE: 63 MMHG | WEIGHT: 115 LBS | SYSTOLIC BLOOD PRESSURE: 103 MMHG | HEART RATE: 76 BPM | RESPIRATION RATE: 18 BRPM | OXYGEN SATURATION: 100 % | BODY MASS INDEX: 21.16 KG/M2 | HEIGHT: 62 IN | TEMPERATURE: 97.7 F

## 2019-08-23 DIAGNOSIS — R10.13 DYSPEPSIA: ICD-10-CM

## 2019-08-23 DIAGNOSIS — R10.9 ABDOMINAL PAIN, UNSPECIFIED ABDOMINAL LOCATION: Primary | ICD-10-CM

## 2019-08-23 LAB
ABSOLUTE EOS #: 0.13 K/UL (ref 0–0.44)
ABSOLUTE IMMATURE GRANULOCYTE: 0.01 K/UL (ref 0–0.3)
ABSOLUTE LYMPH #: 1.23 K/UL (ref 1.1–3.7)
ABSOLUTE MONO #: 0.36 K/UL (ref 0.1–1.2)
ALBUMIN SERPL-MCNC: 4 G/DL (ref 3.5–5.2)
ALBUMIN/GLOBULIN RATIO: NORMAL (ref 1–2.5)
ALP BLD-CCNC: 57 U/L (ref 35–104)
ALT SERPL-CCNC: 5 U/L (ref 5–33)
ANION GAP SERPL CALCULATED.3IONS-SCNC: 11 MMOL/L (ref 9–17)
AST SERPL-CCNC: 9 U/L
BASOPHILS # BLD: 0 % (ref 0–2)
BASOPHILS ABSOLUTE: <0.03 K/UL (ref 0–0.2)
BILIRUB SERPL-MCNC: 0.35 MG/DL (ref 0.3–1.2)
BILIRUBIN DIRECT: 0.09 MG/DL
BILIRUBIN, INDIRECT: 0.26 MG/DL (ref 0–1)
BILIRUBIN, POC: NORMAL
BLOOD URINE, POC: NORMAL
BUN BLDV-MCNC: 15 MG/DL (ref 6–20)
BUN/CREAT BLD: 20 (ref 9–20)
CALCIUM SERPL-MCNC: 8.5 MG/DL (ref 8.6–10.4)
CHLORIDE BLD-SCNC: 103 MMOL/L (ref 98–107)
CHP ED QC CHECK: NORMAL
CO2: 24 MMOL/L (ref 20–31)
COLOR, POC: YELLOW
CREAT SERPL-MCNC: 0.76 MG/DL (ref 0.5–0.9)
DIFFERENTIAL TYPE: ABNORMAL
EOSINOPHILS RELATIVE PERCENT: 2 % (ref 1–4)
GFR AFRICAN AMERICAN: >60 ML/MIN
GFR NON-AFRICAN AMERICAN: >60 ML/MIN
GFR SERPL CREATININE-BSD FRML MDRD: ABNORMAL ML/MIN/{1.73_M2}
GFR SERPL CREATININE-BSD FRML MDRD: ABNORMAL ML/MIN/{1.73_M2}
GLOBULIN: NORMAL G/DL (ref 1.5–3.8)
GLUCOSE BLD-MCNC: 102 MG/DL (ref 70–99)
GLUCOSE URINE, POC: NORMAL
HCT VFR BLD CALC: 36.2 % (ref 36.3–47.1)
HEMOGLOBIN: 11.5 G/DL (ref 11.9–15.1)
IMMATURE GRANULOCYTES: 0 %
KETONES, POC: NORMAL
LEUKOCYTE EST, POC: NORMAL
LIPASE: 36 U/L (ref 13–60)
LYMPHOCYTES # BLD: 19 % (ref 24–43)
MCH RBC QN AUTO: 26.1 PG (ref 25.2–33.5)
MCHC RBC AUTO-ENTMCNC: 31.8 G/DL (ref 28.4–34.8)
MCV RBC AUTO: 82.3 FL (ref 82.6–102.9)
MONOCYTES # BLD: 6 % (ref 3–12)
NITRITE, POC: NORMAL
NRBC AUTOMATED: 0 PER 100 WBC
PDW BLD-RTO: 14.6 % (ref 11.8–14.4)
PH, POC: 7
PLATELET # BLD: 248 K/UL (ref 138–453)
PLATELET ESTIMATE: ABNORMAL
PMV BLD AUTO: 9.6 FL (ref 8.1–13.5)
POTASSIUM SERPL-SCNC: 3.7 MMOL/L (ref 3.7–5.3)
PREGNANCY TEST URINE, POC: NORMAL
PROTEIN, POC: NORMAL
RBC # BLD: 4.4 M/UL (ref 3.95–5.11)
RBC # BLD: ABNORMAL 10*6/UL
SEG NEUTROPHILS: 73 % (ref 36–65)
SEGMENTED NEUTROPHILS ABSOLUTE COUNT: 4.65 K/UL (ref 1.5–8.1)
SODIUM BLD-SCNC: 138 MMOL/L (ref 135–144)
SPECIFIC GRAVITY, POC: 1.02
TOTAL PROTEIN: 7 G/DL (ref 6.4–8.3)
UROBILINOGEN, POC: 1
WBC # BLD: 6.4 K/UL (ref 3.5–11.3)
WBC # BLD: ABNORMAL 10*3/UL

## 2019-08-23 PROCEDURE — 80076 HEPATIC FUNCTION PANEL: CPT

## 2019-08-23 PROCEDURE — 81025 URINE PREGNANCY TEST: CPT

## 2019-08-23 PROCEDURE — 6370000000 HC RX 637 (ALT 250 FOR IP): Performed by: EMERGENCY MEDICINE

## 2019-08-23 PROCEDURE — 85025 COMPLETE CBC W/AUTO DIFF WBC: CPT

## 2019-08-23 PROCEDURE — 80048 BASIC METABOLIC PNL TOTAL CA: CPT

## 2019-08-23 PROCEDURE — 6360000002 HC RX W HCPCS: Performed by: EMERGENCY MEDICINE

## 2019-08-23 PROCEDURE — 83690 ASSAY OF LIPASE: CPT

## 2019-08-23 PROCEDURE — 74018 RADEX ABDOMEN 1 VIEW: CPT

## 2019-08-23 PROCEDURE — 36415 COLL VENOUS BLD VENIPUNCTURE: CPT

## 2019-08-23 PROCEDURE — 96372 THER/PROPH/DIAG INJ SC/IM: CPT

## 2019-08-23 PROCEDURE — 99284 EMERGENCY DEPT VISIT MOD MDM: CPT

## 2019-08-23 PROCEDURE — 81003 URINALYSIS AUTO W/O SCOPE: CPT

## 2019-08-23 RX ORDER — KETOROLAC TROMETHAMINE 30 MG/ML
30 INJECTION, SOLUTION INTRAMUSCULAR; INTRAVENOUS ONCE
Status: COMPLETED | OUTPATIENT
Start: 2019-08-23 | End: 2019-08-23

## 2019-08-23 RX ORDER — ONDANSETRON 4 MG/1
4 TABLET, ORALLY DISINTEGRATING ORAL ONCE
Status: COMPLETED | OUTPATIENT
Start: 2019-08-23 | End: 2019-08-23

## 2019-08-23 RX ADMIN — KETOROLAC TROMETHAMINE 30 MG: 30 INJECTION, SOLUTION INTRAMUSCULAR at 10:47

## 2019-08-23 RX ADMIN — ONDANSETRON 4 MG: 4 TABLET, ORALLY DISINTEGRATING ORAL at 10:47

## 2019-08-23 RX ADMIN — Medication: at 10:47

## 2019-08-23 ASSESSMENT — PAIN SCALES - GENERAL
PAINLEVEL_OUTOF10: 7
PAINLEVEL_OUTOF10: 10

## 2019-08-23 ASSESSMENT — PAIN DESCRIPTION - FREQUENCY: FREQUENCY: INTERMITTENT

## 2019-08-23 ASSESSMENT — PAIN DESCRIPTION - ORIENTATION: ORIENTATION: MID

## 2019-08-23 ASSESSMENT — PAIN DESCRIPTION - PAIN TYPE: TYPE: ACUTE PAIN

## 2019-08-23 ASSESSMENT — PAIN DESCRIPTION - DESCRIPTORS: DESCRIPTORS: SHARP;SHOOTING;STABBING

## 2019-08-23 ASSESSMENT — PAIN DESCRIPTION - LOCATION: LOCATION: ABDOMEN

## 2019-08-23 NOTE — ED PROVIDER NOTES
She is oriented to person, place, and time. She appears well-developed and well-nourished. HENT:   Head: Normocephalic and atraumatic. Eyes: Conjunctivae and EOM are normal.   Neck: Normal range of motion. Neck supple. Cardiovascular: Normal rate and regular rhythm. Pulmonary/Chest: Effort normal and breath sounds normal.   Abdominal: Soft. She exhibits no mass. There is no tenderness. Musculoskeletal: Normal range of motion. She exhibits no edema, tenderness or deformity. Neurological: She is alert and oriented to person, place, and time. Skin: Skin is warm and dry. MEDICAL DECISION MAKING:          CRITICAL CARE:       PROCEDURES:    Procedures    DIAGNOSTIC RESULTS   EKG:All EKG's are interpreted by the Emergency Department Physician who either signs or Co-signs this chart in the absence of a cardiologist.        RADIOLOGY:All plain film, CT, MRI, and formal ultrasound images (except ED bedside ultrasound) are read by the radiologist, see reports below, unless otherwisenoted in MDM or here. XR ABDOMEN (KUB) (SINGLE AP VIEW)   Final Result   Nonobstructive bowel gas pattern. The patient does not appear constipated. LABS: All lab results were reviewed by myself, and all abnormals are listed below. Labs Reviewed   CBC WITH AUTO DIFFERENTIAL - Abnormal; Notable for the following components:       Result Value    Hemoglobin 11.5 (*)     Hematocrit 36.2 (*)     MCV 82.3 (*)     RDW 14.6 (*)     Seg Neutrophils 73 (*)     Lymphocytes 19 (*)     All other components within normal limits   BASIC METABOLIC PANEL W/ REFLEX TO MG FOR LOW K - Abnormal; Notable for the following components:    Glucose 102 (*)     Calcium 8.5 (*)     All other components within normal limits   POCT URINE PREGNANCY - Normal   POCT URINALYSIS DIPSTICK - Normal   HEPATIC FUNCTION PANEL   LIPASE       EMERGENCY DEPARTMENTCOURSE:     Patient felt much better after GI cocktail and Toradol.   X-rays unremarkable blood

## 2019-08-23 NOTE — ED NOTES
Patient brought into ec by private auto . advised by patient that she developed sharp ain in abd area around 5 days ago and discomfort has remained constant and has increased in intensity. Denies ant n/v. States that she thought she may be constipated but has had a  couple bowel movements over the past week and her last bowel movement was this am.  Last  Menses was 1st week of aug. Upon arrival patient is alert/oriented  Respirations non labored. Voided urine obtained and urine preg test and urine ua done. patient appears in moderate distress and is positioned on right side. patient being evaluated by doctor Rogelio Pinto.      Sloan Ga, RN  08/23/19 2906

## 2019-08-26 LAB — HCG, PREGNANCY URINE (POC): NEGATIVE

## 2019-11-03 ENCOUNTER — APPOINTMENT (OUTPATIENT)
Dept: GENERAL RADIOLOGY | Age: 24
DRG: 135 | End: 2019-11-03
Payer: COMMERCIAL

## 2019-11-03 ENCOUNTER — HOSPITAL ENCOUNTER (INPATIENT)
Age: 24
LOS: 4 days | Discharge: HOME | DRG: 135 | End: 2019-11-07
Attending: EMERGENCY MEDICINE | Admitting: INTERNAL MEDICINE
Payer: COMMERCIAL

## 2019-11-03 DIAGNOSIS — S27.0XXA TRAUMATIC PNEUMOTHORAX, INITIAL ENCOUNTER: Primary | ICD-10-CM

## 2019-11-03 PROBLEM — I95.9 HYPOTENSION: Status: ACTIVE | Noted: 2019-11-03

## 2019-11-03 PROBLEM — J93.9 PNEUMOTHORAX: Status: ACTIVE | Noted: 2019-11-03
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
## 2019-11-03 PROBLEM — D64.9 ANEMIA: Status: ACTIVE | Noted: 2019-11-03

## 2019-11-03 PROCEDURE — 2500000003 HC RX 250 WO HCPCS: Performed by: EMERGENCY MEDICINE

## 2019-11-03 PROCEDURE — 2700000000 HC OXYGEN THERAPY PER DAY

## 2019-11-03 PROCEDURE — 72072 X-RAY EXAM THORAC SPINE 3VWS: CPT

## 2019-11-03 PROCEDURE — 94770 HC ETCO2 MONITOR DAILY: CPT

## 2019-11-03 PROCEDURE — 85610 PROTHROMBIN TIME: CPT

## 2019-11-03 PROCEDURE — 6370000000 HC RX 637 (ALT 250 FOR IP): Performed by: INTERNAL MEDICINE

## 2019-11-03 PROCEDURE — 99221 1ST HOSP IP/OBS SF/LOW 40: CPT | Performed by: NURSE PRACTITIONER

## 2019-11-03 PROCEDURE — 72100 X-RAY EXAM L-S SPINE 2/3 VWS: CPT

## 2019-11-03 PROCEDURE — 6360000002 HC RX W HCPCS: Performed by: EMERGENCY MEDICINE

## 2019-11-03 PROCEDURE — 71101 X-RAY EXAM UNILAT RIBS/CHEST: CPT

## 2019-11-03 PROCEDURE — 1200000000 HC SEMI PRIVATE

## 2019-11-03 PROCEDURE — 80048 BASIC METABOLIC PNL TOTAL CA: CPT

## 2019-11-03 PROCEDURE — 36415 COLL VENOUS BLD VENIPUNCTURE: CPT

## 2019-11-03 PROCEDURE — 71045 X-RAY EXAM CHEST 1 VIEW: CPT

## 2019-11-03 PROCEDURE — 6370000000 HC RX 637 (ALT 250 FOR IP): Performed by: NURSE PRACTITIONER

## 2019-11-03 PROCEDURE — 96372 THER/PROPH/DIAG INJ SC/IM: CPT

## 2019-11-03 PROCEDURE — 2580000003 HC RX 258: Performed by: NURSE PRACTITIONER

## 2019-11-03 PROCEDURE — 6360000002 HC RX W HCPCS: Performed by: NURSE PRACTITIONER

## 2019-11-03 PROCEDURE — 32551 INSERTION OF CHEST TUBE: CPT

## 2019-11-03 PROCEDURE — 85730 THROMBOPLASTIN TIME PARTIAL: CPT

## 2019-11-03 PROCEDURE — 99285 EMERGENCY DEPT VISIT HI MDM: CPT

## 2019-11-03 PROCEDURE — 94761 N-INVAS EAR/PLS OXIMETRY MLT: CPT

## 2019-11-03 PROCEDURE — 85025 COMPLETE CBC W/AUTO DIFF WBC: CPT

## 2019-11-03 RX ORDER — ONDANSETRON 2 MG/ML
4 INJECTION INTRAMUSCULAR; INTRAVENOUS EVERY 6 HOURS PRN
Status: DISCONTINUED | OUTPATIENT
Start: 2019-11-03 | End: 2019-11-07 | Stop reason: HOSPADM

## 2019-11-03 RX ORDER — IBUPROFEN 200 MG
600 TABLET ORAL EVERY 6 HOURS PRN
Status: DISCONTINUED | OUTPATIENT
Start: 2019-11-03 | End: 2019-11-03

## 2019-11-03 RX ORDER — MORPHINE SULFATE 4 MG/ML
4 INJECTION, SOLUTION INTRAMUSCULAR; INTRAVENOUS ONCE
Status: COMPLETED | OUTPATIENT
Start: 2019-11-03 | End: 2019-11-03

## 2019-11-03 RX ORDER — ONDANSETRON 4 MG/1
4 TABLET, ORALLY DISINTEGRATING ORAL EVERY 6 HOURS PRN
Status: DISCONTINUED | OUTPATIENT
Start: 2019-11-03 | End: 2019-11-07 | Stop reason: HOSPADM

## 2019-11-03 RX ORDER — ETOMIDATE 2 MG/ML
5 INJECTION INTRAVENOUS ONCE
Status: COMPLETED | OUTPATIENT
Start: 2019-11-03 | End: 2019-11-03

## 2019-11-03 RX ORDER — MORPHINE SULFATE 2 MG/ML
2 INJECTION, SOLUTION INTRAMUSCULAR; INTRAVENOUS ONCE
Status: COMPLETED | OUTPATIENT
Start: 2019-11-03 | End: 2019-11-03

## 2019-11-03 RX ORDER — MIDAZOLAM HYDROCHLORIDE 1 MG/ML
4 INJECTION INTRAMUSCULAR; INTRAVENOUS ONCE
Status: COMPLETED | OUTPATIENT
Start: 2019-11-03 | End: 2019-11-03

## 2019-11-03 RX ORDER — HYDROCODONE BITARTRATE AND ACETAMINOPHEN 5; 325 MG/1; MG/1
2 TABLET ORAL EVERY 4 HOURS PRN
Status: DISCONTINUED | OUTPATIENT
Start: 2019-11-03 | End: 2019-11-07 | Stop reason: HOSPADM

## 2019-11-03 RX ORDER — SODIUM CHLORIDE 0.9 % (FLUSH) 0.9 %
10 SYRINGE (ML) INJECTION PRN
Status: DISCONTINUED | OUTPATIENT
Start: 2019-11-03 | End: 2019-11-07 | Stop reason: HOSPADM

## 2019-11-03 RX ORDER — LIDOCAINE HYDROCHLORIDE 10 MG/ML
10 INJECTION, SOLUTION INFILTRATION; PERINEURAL ONCE
Status: COMPLETED | OUTPATIENT
Start: 2019-11-03 | End: 2019-11-03

## 2019-11-03 RX ORDER — LORAZEPAM 2 MG/ML
INJECTION INTRAMUSCULAR
Status: DISPENSED
Start: 2019-11-03 | End: 2019-11-04

## 2019-11-03 RX ORDER — POTASSIUM CHLORIDE 7.45 MG/ML
10 INJECTION INTRAVENOUS PRN
Status: DISCONTINUED | OUTPATIENT
Start: 2019-11-03 | End: 2019-11-07 | Stop reason: HOSPADM

## 2019-11-03 RX ORDER — SODIUM CHLORIDE 0.9 % (FLUSH) 0.9 %
10 SYRINGE (ML) INJECTION EVERY 12 HOURS SCHEDULED
Status: DISCONTINUED | OUTPATIENT
Start: 2019-11-03 | End: 2019-11-07 | Stop reason: HOSPADM

## 2019-11-03 RX ORDER — ONDANSETRON 4 MG/1
4 TABLET, ORALLY DISINTEGRATING ORAL ONCE
Status: COMPLETED | OUTPATIENT
Start: 2019-11-03 | End: 2019-11-03

## 2019-11-03 RX ORDER — ETOMIDATE 2 MG/ML
0.3 INJECTION INTRAVENOUS ONCE
Status: COMPLETED | OUTPATIENT
Start: 2019-11-03 | End: 2019-11-03

## 2019-11-03 RX ORDER — MAGNESIUM SULFATE 1 G/100ML
1 INJECTION INTRAVENOUS PRN
Status: DISCONTINUED | OUTPATIENT
Start: 2019-11-03 | End: 2019-11-07 | Stop reason: HOSPADM

## 2019-11-03 RX ORDER — HYDROCODONE BITARTRATE AND ACETAMINOPHEN 5; 325 MG/1; MG/1
1 TABLET ORAL EVERY 4 HOURS PRN
Status: DISCONTINUED | OUTPATIENT
Start: 2019-11-03 | End: 2019-11-07 | Stop reason: HOSPADM

## 2019-11-03 RX ORDER — NICOTINE 21 MG/24HR
1 PATCH, TRANSDERMAL 24 HOURS TRANSDERMAL DAILY PRN
Status: DISCONTINUED | OUTPATIENT
Start: 2019-11-03 | End: 2019-11-07 | Stop reason: HOSPADM

## 2019-11-03 RX ORDER — POTASSIUM CHLORIDE 20 MEQ/1
40 TABLET, EXTENDED RELEASE ORAL PRN
Status: DISCONTINUED | OUTPATIENT
Start: 2019-11-03 | End: 2019-11-07 | Stop reason: HOSPADM

## 2019-11-03 RX ORDER — ORPHENADRINE CITRATE 30 MG/ML
60 INJECTION INTRAMUSCULAR; INTRAVENOUS ONCE
Status: COMPLETED | OUTPATIENT
Start: 2019-11-03 | End: 2019-11-03

## 2019-11-03 RX ORDER — ONDANSETRON 2 MG/ML
4 INJECTION INTRAMUSCULAR; INTRAVENOUS EVERY 6 HOURS PRN
Status: DISCONTINUED | OUTPATIENT
Start: 2019-11-03 | End: 2019-11-03 | Stop reason: SDUPTHER

## 2019-11-03 RX ORDER — ACETAMINOPHEN 325 MG/1
650 TABLET ORAL EVERY 4 HOURS PRN
Status: DISCONTINUED | OUTPATIENT
Start: 2019-11-03 | End: 2019-11-07 | Stop reason: HOSPADM

## 2019-11-03 RX ORDER — SODIUM CHLORIDE 9 MG/ML
INJECTION, SOLUTION INTRAVENOUS CONTINUOUS
Status: DISCONTINUED | OUTPATIENT
Start: 2019-11-03 | End: 2019-11-07 | Stop reason: HOSPADM

## 2019-11-03 RX ADMIN — SODIUM CHLORIDE: 9 INJECTION, SOLUTION INTRAVENOUS at 23:03

## 2019-11-03 RX ADMIN — MORPHINE SULFATE 4 MG: 4 INJECTION INTRAVENOUS at 19:22

## 2019-11-03 RX ADMIN — ETOMIDATE 5 MG: 2 INJECTION INTRAVENOUS at 17:51

## 2019-11-03 RX ADMIN — MORPHINE SULFATE 2 MG: 2 INJECTION, SOLUTION INTRAMUSCULAR; INTRAVENOUS at 15:22

## 2019-11-03 RX ADMIN — ORPHENADRINE CITRATE 60 MG: 30 INJECTION INTRAMUSCULAR; INTRAVENOUS at 15:22

## 2019-11-03 RX ADMIN — LIDOCAINE HYDROCHLORIDE 10 ML: 10 INJECTION, SOLUTION INFILTRATION; PERINEURAL at 17:44

## 2019-11-03 RX ADMIN — ETOMIDATE 16.4 MG: 2 INJECTION, SOLUTION INTRAVENOUS at 18:01

## 2019-11-03 RX ADMIN — MIDAZOLAM 4 MG: 1 INJECTION INTRAMUSCULAR; INTRAVENOUS at 18:06

## 2019-11-03 RX ADMIN — ONDANSETRON 4 MG: 4 TABLET, ORALLY DISINTEGRATING ORAL at 15:22

## 2019-11-03 RX ADMIN — MORPHINE SULFATE 2 MG: 2 INJECTION, SOLUTION INTRAMUSCULAR; INTRAVENOUS at 20:38

## 2019-11-03 RX ADMIN — HYDROCODONE BITARTRATE AND ACETAMINOPHEN 2 TABLET: 5; 325 TABLET ORAL at 20:07

## 2019-11-03 ASSESSMENT — PAIN SCALES - GENERAL
PAINLEVEL_OUTOF10: 10
PAINLEVEL_OUTOF10: 10
PAINLEVEL_OUTOF10: 7
PAINLEVEL_OUTOF10: 10
PAINLEVEL_OUTOF10: 8

## 2019-11-03 ASSESSMENT — ENCOUNTER SYMPTOMS
BACK PAIN: 1
SHORTNESS OF BREATH: 0
ABDOMINAL PAIN: 0
COUGH: 0
NAUSEA: 0
COLOR CHANGE: 1
VOMITING: 0

## 2019-11-03 ASSESSMENT — PAIN DESCRIPTION - DESCRIPTORS: DESCRIPTORS: THROBBING;SHARP

## 2019-11-03 ASSESSMENT — PAIN DESCRIPTION - FREQUENCY: FREQUENCY: CONTINUOUS

## 2019-11-03 ASSESSMENT — PAIN DESCRIPTION - LOCATION: LOCATION: BACK;RIB CAGE

## 2019-11-03 ASSESSMENT — PAIN DESCRIPTION - ORIENTATION: ORIENTATION: RIGHT

## 2019-11-03 ASSESSMENT — PAIN SCALES - WONG BAKER: WONGBAKER_NUMERICALRESPONSE: 10

## 2019-11-04 ENCOUNTER — APPOINTMENT (OUTPATIENT)
Dept: CT IMAGING | Age: 24
DRG: 135 | End: 2019-11-04
Payer: COMMERCIAL

## 2019-11-04 ENCOUNTER — APPOINTMENT (OUTPATIENT)
Dept: GENERAL RADIOLOGY | Age: 24
DRG: 135 | End: 2019-11-04
Payer: COMMERCIAL

## 2019-11-04 PROBLEM — D64.9 ANEMIA: Status: RESOLVED | Noted: 2019-11-03 | Resolved: 2019-11-04

## 2019-11-04 LAB
ABSOLUTE EOS #: 0 K/UL (ref 0–0.4)
ABSOLUTE IMMATURE GRANULOCYTE: 0.16 K/UL (ref 0–0.3)
ABSOLUTE LYMPH #: 0.66 K/UL (ref 1–4.8)
ABSOLUTE MONO #: 0.66 K/UL (ref 0.2–0.8)
ANION GAP SERPL CALCULATED.3IONS-SCNC: 11 MMOL/L (ref 9–17)
ANION GAP SERPL CALCULATED.3IONS-SCNC: 9 MMOL/L (ref 9–17)
BASOPHILS # BLD: 0 %
BASOPHILS ABSOLUTE: 0 K/UL (ref 0–0.2)
BUN BLDV-MCNC: 11 MG/DL (ref 6–20)
BUN BLDV-MCNC: 13 MG/DL (ref 6–20)
BUN/CREAT BLD: 15 (ref 9–20)
BUN/CREAT BLD: 15 (ref 9–20)
CALCIUM SERPL-MCNC: 8 MG/DL (ref 8.6–10.4)
CALCIUM SERPL-MCNC: 8.9 MG/DL (ref 8.6–10.4)
CHLORIDE BLD-SCNC: 102 MMOL/L (ref 98–107)
CHLORIDE BLD-SCNC: 104 MMOL/L (ref 98–107)
CO2: 24 MMOL/L (ref 20–31)
CO2: 24 MMOL/L (ref 20–31)
CREAT SERPL-MCNC: 0.74 MG/DL (ref 0.5–0.9)
CREAT SERPL-MCNC: 0.86 MG/DL (ref 0.5–0.9)
DIFFERENTIAL TYPE: ABNORMAL
EOSINOPHILS RELATIVE PERCENT: 0 % (ref 1–4)
GFR AFRICAN AMERICAN: >60 ML/MIN
GFR AFRICAN AMERICAN: >60 ML/MIN
GFR NON-AFRICAN AMERICAN: >60 ML/MIN
GFR NON-AFRICAN AMERICAN: >60 ML/MIN
GFR SERPL CREATININE-BSD FRML MDRD: ABNORMAL ML/MIN/{1.73_M2}
GLUCOSE BLD-MCNC: 108 MG/DL (ref 70–99)
GLUCOSE BLD-MCNC: 116 MG/DL (ref 70–99)
HCT VFR BLD CALC: 33.3 % (ref 36.3–47.1)
HCT VFR BLD CALC: 36.8 % (ref 36.3–47.1)
HEMOGLOBIN: 10.4 G/DL (ref 11.9–15.1)
HEMOGLOBIN: 11.7 G/DL (ref 11.9–15.1)
IMMATURE GRANULOCYTES: 1 %
INR BLD: 1
LYMPHOCYTES # BLD: 4 % (ref 24–44)
MCH RBC QN AUTO: 26.3 PG (ref 25.2–33.5)
MCH RBC QN AUTO: 26.3 PG (ref 25.2–33.5)
MCHC RBC AUTO-ENTMCNC: 31.2 G/DL (ref 28.4–34.8)
MCHC RBC AUTO-ENTMCNC: 31.8 G/DL (ref 28.4–34.8)
MCV RBC AUTO: 82.7 FL (ref 82.6–102.9)
MCV RBC AUTO: 84.1 FL (ref 82.6–102.9)
MONOCYTES # BLD: 4 % (ref 1–7)
NRBC AUTOMATED: 0 PER 100 WBC
NRBC AUTOMATED: 0 PER 100 WBC
PARTIAL THROMBOPLASTIN TIME: 23 SEC (ref 23–31)
PDW BLD-RTO: 14.8 % (ref 11.8–14.4)
PDW BLD-RTO: 15 % (ref 11.8–14.4)
PLATELET # BLD: 260 K/UL (ref 138–453)
PLATELET # BLD: 289 K/UL (ref 138–453)
PLATELET ESTIMATE: ABNORMAL
PMV BLD AUTO: 10.2 FL (ref 8.1–13.5)
PMV BLD AUTO: 10.2 FL (ref 8.1–13.5)
POTASSIUM SERPL-SCNC: 3.6 MMOL/L (ref 3.7–5.3)
POTASSIUM SERPL-SCNC: 3.9 MMOL/L (ref 3.7–5.3)
PROTHROMBIN TIME: 10.5 SEC (ref 9.7–11.6)
RBC # BLD: 3.96 M/UL (ref 3.95–5.11)
RBC # BLD: 4.45 M/UL (ref 3.95–5.11)
RBC # BLD: ABNORMAL 10*6/UL
SEG NEUTROPHILS: 91 % (ref 36–66)
SEGMENTED NEUTROPHILS ABSOLUTE COUNT: 14.92 K/UL (ref 1.8–7.7)
SODIUM BLD-SCNC: 137 MMOL/L (ref 135–144)
SODIUM BLD-SCNC: 137 MMOL/L (ref 135–144)
WBC # BLD: 16.4 K/UL (ref 3.5–11.3)
WBC # BLD: 7.3 K/UL (ref 3.5–11.3)
WBC # BLD: ABNORMAL 10*3/UL

## 2019-11-04 PROCEDURE — 6360000002 HC RX W HCPCS: Performed by: NURSE PRACTITIONER

## 2019-11-04 PROCEDURE — 6370000000 HC RX 637 (ALT 250 FOR IP): Performed by: INTERNAL MEDICINE

## 2019-11-04 PROCEDURE — 97530 THERAPEUTIC ACTIVITIES: CPT

## 2019-11-04 PROCEDURE — 97535 SELF CARE MNGMENT TRAINING: CPT

## 2019-11-04 PROCEDURE — 97162 PT EVAL MOD COMPLEX 30 MIN: CPT

## 2019-11-04 PROCEDURE — 99232 SBSQ HOSP IP/OBS MODERATE 35: CPT | Performed by: INTERNAL MEDICINE

## 2019-11-04 PROCEDURE — 71045 X-RAY EXAM CHEST 1 VIEW: CPT

## 2019-11-04 PROCEDURE — 36415 COLL VENOUS BLD VENIPUNCTURE: CPT

## 2019-11-04 PROCEDURE — 71250 CT THORAX DX C-: CPT

## 2019-11-04 PROCEDURE — 97116 GAIT TRAINING THERAPY: CPT

## 2019-11-04 PROCEDURE — 80048 BASIC METABOLIC PNL TOTAL CA: CPT

## 2019-11-04 PROCEDURE — 2580000003 HC RX 258: Performed by: INTERNAL MEDICINE

## 2019-11-04 PROCEDURE — 1200000000 HC SEMI PRIVATE

## 2019-11-04 PROCEDURE — 2580000003 HC RX 258: Performed by: NURSE PRACTITIONER

## 2019-11-04 PROCEDURE — 85027 COMPLETE CBC AUTOMATED: CPT

## 2019-11-04 PROCEDURE — 97165 OT EVAL LOW COMPLEX 30 MIN: CPT

## 2019-11-04 RX ORDER — KETOROLAC TROMETHAMINE 30 MG/ML
30 INJECTION, SOLUTION INTRAMUSCULAR; INTRAVENOUS EVERY 6 HOURS PRN
Status: DISCONTINUED | OUTPATIENT
Start: 2019-11-04 | End: 2019-11-07 | Stop reason: HOSPADM

## 2019-11-04 RX ADMIN — SODIUM CHLORIDE: 9 INJECTION, SOLUTION INTRAVENOUS at 13:00

## 2019-11-04 RX ADMIN — HYDROCODONE BITARTRATE AND ACETAMINOPHEN 2 TABLET: 5; 325 TABLET ORAL at 08:44

## 2019-11-04 RX ADMIN — HYDROCODONE BITARTRATE AND ACETAMINOPHEN 2 TABLET: 5; 325 TABLET ORAL at 12:59

## 2019-11-04 RX ADMIN — SODIUM CHLORIDE, PRESERVATIVE FREE 10 ML: 5 INJECTION INTRAVENOUS at 19:10

## 2019-11-04 RX ADMIN — KETOROLAC TROMETHAMINE 30 MG: 30 INJECTION, SOLUTION INTRAMUSCULAR at 02:30

## 2019-11-04 RX ADMIN — KETOROLAC TROMETHAMINE 30 MG: 30 INJECTION, SOLUTION INTRAMUSCULAR at 14:52

## 2019-11-04 RX ADMIN — KETOROLAC TROMETHAMINE 30 MG: 30 INJECTION, SOLUTION INTRAMUSCULAR at 22:56

## 2019-11-04 RX ADMIN — HYDROCODONE BITARTRATE AND ACETAMINOPHEN 2 TABLET: 5; 325 TABLET ORAL at 00:19

## 2019-11-04 RX ADMIN — HYDROCODONE BITARTRATE AND ACETAMINOPHEN 2 TABLET: 5; 325 TABLET ORAL at 19:10

## 2019-11-04 RX ADMIN — HYDROCODONE BITARTRATE AND ACETAMINOPHEN 2 TABLET: 5; 325 TABLET ORAL at 04:00

## 2019-11-04 ASSESSMENT — PAIN DESCRIPTION - ONSET
ONSET: ON-GOING

## 2019-11-04 ASSESSMENT — PAIN DESCRIPTION - FREQUENCY
FREQUENCY: CONTINUOUS

## 2019-11-04 ASSESSMENT — PAIN SCALES - GENERAL
PAINLEVEL_OUTOF10: 8
PAINLEVEL_OUTOF10: 8
PAINLEVEL_OUTOF10: 4
PAINLEVEL_OUTOF10: 7
PAINLEVEL_OUTOF10: 8
PAINLEVEL_OUTOF10: 5
PAINLEVEL_OUTOF10: 7
PAINLEVEL_OUTOF10: 7
PAINLEVEL_OUTOF10: 6
PAINLEVEL_OUTOF10: 7
PAINLEVEL_OUTOF10: 6
PAINLEVEL_OUTOF10: 4
PAINLEVEL_OUTOF10: 7
PAINLEVEL_OUTOF10: 6
PAINLEVEL_OUTOF10: 7

## 2019-11-04 ASSESSMENT — PAIN DESCRIPTION - PAIN TYPE
TYPE: ACUTE PAIN
TYPE: ACUTE PAIN;SURGICAL PAIN

## 2019-11-04 ASSESSMENT — PAIN DESCRIPTION - ORIENTATION
ORIENTATION: RIGHT

## 2019-11-04 ASSESSMENT — PAIN - FUNCTIONAL ASSESSMENT
PAIN_FUNCTIONAL_ASSESSMENT: ACTIVITIES ARE NOT PREVENTED

## 2019-11-04 ASSESSMENT — PAIN DESCRIPTION - DESCRIPTORS
DESCRIPTORS: SHARP;THROBBING

## 2019-11-04 ASSESSMENT — PAIN SCALES - WONG BAKER
WONGBAKER_NUMERICALRESPONSE: 6
WONGBAKER_NUMERICALRESPONSE: 6
WONGBAKER_NUMERICALRESPONSE: 2
WONGBAKER_NUMERICALRESPONSE: 4
WONGBAKER_NUMERICALRESPONSE: 4

## 2019-11-04 ASSESSMENT — PAIN DESCRIPTION - LOCATION
LOCATION: RIB CAGE
LOCATION: BACK;RIB CAGE
LOCATION: BACK;RIB CAGE
LOCATION: RIB CAGE
LOCATION: BACK;RIB CAGE
LOCATION: RIB CAGE
LOCATION: BACK;RIB CAGE

## 2019-11-04 ASSESSMENT — PAIN DESCRIPTION - PROGRESSION
CLINICAL_PROGRESSION: NOT CHANGED
CLINICAL_PROGRESSION: GRADUALLY IMPROVING
CLINICAL_PROGRESSION: GRADUALLY IMPROVING
CLINICAL_PROGRESSION: GRADUALLY WORSENING
CLINICAL_PROGRESSION: GRADUALLY IMPROVING

## 2019-11-04 ASSESSMENT — ENCOUNTER SYMPTOMS
NAUSEA: 1
SHORTNESS OF BREATH: 1
BACK PAIN: 1

## 2019-11-05 ENCOUNTER — APPOINTMENT (OUTPATIENT)
Dept: GENERAL RADIOLOGY | Age: 24
DRG: 135 | End: 2019-11-05
Payer: COMMERCIAL

## 2019-11-05 LAB
ABSOLUTE EOS #: 0.15 K/UL (ref 0–0.44)
ABSOLUTE IMMATURE GRANULOCYTE: 0.01 K/UL (ref 0–0.3)
ABSOLUTE LYMPH #: 1.64 K/UL (ref 1.1–3.7)
ABSOLUTE MONO #: 0.33 K/UL (ref 0.1–1.2)
ANION GAP SERPL CALCULATED.3IONS-SCNC: 10 MMOL/L (ref 9–17)
BASOPHILS # BLD: 1 % (ref 0–2)
BASOPHILS ABSOLUTE: 0.03 K/UL (ref 0–0.2)
BUN BLDV-MCNC: 10 MG/DL (ref 6–20)
BUN/CREAT BLD: 16 (ref 9–20)
CALCIUM SERPL-MCNC: 8 MG/DL (ref 8.6–10.4)
CHLORIDE BLD-SCNC: 105 MMOL/L (ref 98–107)
CO2: 24 MMOL/L (ref 20–31)
CREAT SERPL-MCNC: 0.64 MG/DL (ref 0.5–0.9)
DIFFERENTIAL TYPE: ABNORMAL
EOSINOPHILS RELATIVE PERCENT: 3 % (ref 1–4)
GFR AFRICAN AMERICAN: >60 ML/MIN
GFR NON-AFRICAN AMERICAN: >60 ML/MIN
GFR SERPL CREATININE-BSD FRML MDRD: ABNORMAL ML/MIN/{1.73_M2}
GFR SERPL CREATININE-BSD FRML MDRD: ABNORMAL ML/MIN/{1.73_M2}
GLUCOSE BLD-MCNC: 97 MG/DL (ref 70–99)
HCT VFR BLD CALC: 30.8 % (ref 36.3–47.1)
HEMOGLOBIN: 9.6 G/DL (ref 11.9–15.1)
IMMATURE GRANULOCYTES: 0 %
LYMPHOCYTES # BLD: 37 % (ref 24–43)
MCH RBC QN AUTO: 26.1 PG (ref 25.2–33.5)
MCHC RBC AUTO-ENTMCNC: 31.2 G/DL (ref 28.4–34.8)
MCV RBC AUTO: 83.7 FL (ref 82.6–102.9)
MONOCYTES # BLD: 7 % (ref 3–12)
NRBC AUTOMATED: 0 PER 100 WBC
PDW BLD-RTO: 14.9 % (ref 11.8–14.4)
PLATELET # BLD: 209 K/UL (ref 138–453)
PLATELET ESTIMATE: ABNORMAL
PMV BLD AUTO: 10.2 FL (ref 8.1–13.5)
POTASSIUM SERPL-SCNC: 3.5 MMOL/L (ref 3.7–5.3)
RBC # BLD: 3.68 M/UL (ref 3.95–5.11)
RBC # BLD: ABNORMAL 10*6/UL
SEG NEUTROPHILS: 51 % (ref 36–65)
SEGMENTED NEUTROPHILS ABSOLUTE COUNT: 2.27 K/UL (ref 1.5–8.1)
SODIUM BLD-SCNC: 139 MMOL/L (ref 135–144)
WBC # BLD: 4.4 K/UL (ref 3.5–11.3)
WBC # BLD: ABNORMAL 10*3/UL

## 2019-11-05 PROCEDURE — 6360000002 HC RX W HCPCS: Performed by: NURSE PRACTITIONER

## 2019-11-05 PROCEDURE — 97116 GAIT TRAINING THERAPY: CPT

## 2019-11-05 PROCEDURE — 80048 BASIC METABOLIC PNL TOTAL CA: CPT

## 2019-11-05 PROCEDURE — 2580000003 HC RX 258: Performed by: INTERNAL MEDICINE

## 2019-11-05 PROCEDURE — 71045 X-RAY EXAM CHEST 1 VIEW: CPT

## 2019-11-05 PROCEDURE — 6360000002 HC RX W HCPCS: Performed by: INTERNAL MEDICINE

## 2019-11-05 PROCEDURE — 6370000000 HC RX 637 (ALT 250 FOR IP): Performed by: INTERNAL MEDICINE

## 2019-11-05 PROCEDURE — 99232 SBSQ HOSP IP/OBS MODERATE 35: CPT | Performed by: INTERNAL MEDICINE

## 2019-11-05 PROCEDURE — 85025 COMPLETE CBC W/AUTO DIFF WBC: CPT

## 2019-11-05 PROCEDURE — 1200000000 HC SEMI PRIVATE

## 2019-11-05 PROCEDURE — 36415 COLL VENOUS BLD VENIPUNCTURE: CPT

## 2019-11-05 RX ADMIN — ONDANSETRON 4 MG: 2 INJECTION INTRAMUSCULAR; INTRAVENOUS at 19:51

## 2019-11-05 RX ADMIN — ONDANSETRON 4 MG: 4 TABLET, ORALLY DISINTEGRATING ORAL at 12:54

## 2019-11-05 RX ADMIN — HYDROCODONE BITARTRATE AND ACETAMINOPHEN 2 TABLET: 5; 325 TABLET ORAL at 02:42

## 2019-11-05 RX ADMIN — HYDROCODONE BITARTRATE AND ACETAMINOPHEN 1 TABLET: 5; 325 TABLET ORAL at 14:32

## 2019-11-05 RX ADMIN — HYDROCODONE BITARTRATE AND ACETAMINOPHEN 2 TABLET: 5; 325 TABLET ORAL at 10:13

## 2019-11-05 RX ADMIN — KETOROLAC TROMETHAMINE 30 MG: 30 INJECTION, SOLUTION INTRAMUSCULAR at 05:14

## 2019-11-05 RX ADMIN — KETOROLAC TROMETHAMINE 30 MG: 30 INJECTION, SOLUTION INTRAMUSCULAR at 21:55

## 2019-11-05 RX ADMIN — KETOROLAC TROMETHAMINE 30 MG: 30 INJECTION, SOLUTION INTRAMUSCULAR at 15:51

## 2019-11-05 RX ADMIN — SODIUM CHLORIDE, PRESERVATIVE FREE 10 ML: 5 INJECTION INTRAVENOUS at 19:51

## 2019-11-05 RX ADMIN — HYDROCODONE BITARTRATE AND ACETAMINOPHEN 2 TABLET: 5; 325 TABLET ORAL at 00:16

## 2019-11-05 RX ADMIN — HYDROCODONE BITARTRATE AND ACETAMINOPHEN 2 TABLET: 5; 325 TABLET ORAL at 19:51

## 2019-11-05 RX ADMIN — POTASSIUM CHLORIDE 40 MEQ: 20 TABLET, EXTENDED RELEASE ORAL at 06:05

## 2019-11-05 ASSESSMENT — PAIN DESCRIPTION - LOCATION
LOCATION: RIB CAGE
LOCATION: RIB CAGE

## 2019-11-05 ASSESSMENT — PAIN SCALES - GENERAL
PAINLEVEL_OUTOF10: 7
PAINLEVEL_OUTOF10: 4
PAINLEVEL_OUTOF10: 4
PAINLEVEL_OUTOF10: 8
PAINLEVEL_OUTOF10: 8
PAINLEVEL_OUTOF10: 0
PAINLEVEL_OUTOF10: 7
PAINLEVEL_OUTOF10: 5
PAINLEVEL_OUTOF10: 9
PAINLEVEL_OUTOF10: 8
PAINLEVEL_OUTOF10: 4
PAINLEVEL_OUTOF10: 7
PAINLEVEL_OUTOF10: 10
PAINLEVEL_OUTOF10: 7

## 2019-11-05 ASSESSMENT — PAIN DESCRIPTION - FREQUENCY: FREQUENCY: CONTINUOUS

## 2019-11-05 ASSESSMENT — PAIN - FUNCTIONAL ASSESSMENT: PAIN_FUNCTIONAL_ASSESSMENT: PREVENTS OR INTERFERES SOME ACTIVE ACTIVITIES AND ADLS

## 2019-11-05 ASSESSMENT — PAIN DESCRIPTION - PROGRESSION: CLINICAL_PROGRESSION: NOT CHANGED

## 2019-11-05 ASSESSMENT — PAIN SCALES - WONG BAKER: WONGBAKER_NUMERICALRESPONSE: 2

## 2019-11-05 ASSESSMENT — PAIN DESCRIPTION - DESCRIPTORS: DESCRIPTORS: SHARP

## 2019-11-05 ASSESSMENT — PAIN DESCRIPTION - ORIENTATION
ORIENTATION: RIGHT
ORIENTATION: RIGHT

## 2019-11-05 ASSESSMENT — PAIN DESCRIPTION - ONSET: ONSET: ON-GOING

## 2019-11-05 ASSESSMENT — PAIN DESCRIPTION - PAIN TYPE: TYPE: ACUTE PAIN

## 2019-11-06 ENCOUNTER — APPOINTMENT (OUTPATIENT)
Dept: GENERAL RADIOLOGY | Age: 24
DRG: 135 | End: 2019-11-06
Payer: COMMERCIAL

## 2019-11-06 LAB
ABSOLUTE EOS #: 0.15 K/UL (ref 0–0.44)
ABSOLUTE IMMATURE GRANULOCYTE: 0.01 K/UL (ref 0–0.3)
ABSOLUTE LYMPH #: 1.44 K/UL (ref 1.1–3.7)
ABSOLUTE MONO #: 0.27 K/UL (ref 0.1–1.2)
ANION GAP SERPL CALCULATED.3IONS-SCNC: 9 MMOL/L (ref 9–17)
BASOPHILS # BLD: 1 % (ref 0–2)
BASOPHILS ABSOLUTE: <0.03 K/UL (ref 0–0.2)
BUN BLDV-MCNC: 9 MG/DL (ref 6–20)
BUN/CREAT BLD: 14 (ref 9–20)
CALCIUM SERPL-MCNC: 8.5 MG/DL (ref 8.6–10.4)
CHLORIDE BLD-SCNC: 103 MMOL/L (ref 98–107)
CO2: 25 MMOL/L (ref 20–31)
CREAT SERPL-MCNC: 0.65 MG/DL (ref 0.5–0.9)
DIFFERENTIAL TYPE: ABNORMAL
EOSINOPHILS RELATIVE PERCENT: 4 % (ref 1–4)
GFR AFRICAN AMERICAN: >60 ML/MIN
GFR NON-AFRICAN AMERICAN: >60 ML/MIN
GFR SERPL CREATININE-BSD FRML MDRD: ABNORMAL ML/MIN/{1.73_M2}
GFR SERPL CREATININE-BSD FRML MDRD: ABNORMAL ML/MIN/{1.73_M2}
GLUCOSE BLD-MCNC: 102 MG/DL (ref 70–99)
HCT VFR BLD CALC: 31.4 % (ref 36.3–47.1)
HEMOGLOBIN: 10 G/DL (ref 11.9–15.1)
IMMATURE GRANULOCYTES: 0 %
LYMPHOCYTES # BLD: 34 % (ref 24–43)
MAGNESIUM: 1.7 MG/DL (ref 1.6–2.6)
MCH RBC QN AUTO: 26.5 PG (ref 25.2–33.5)
MCHC RBC AUTO-ENTMCNC: 31.8 G/DL (ref 28.4–34.8)
MCV RBC AUTO: 83.1 FL (ref 82.6–102.9)
MONOCYTES # BLD: 6 % (ref 3–12)
NRBC AUTOMATED: 0 PER 100 WBC
PDW BLD-RTO: 14.9 % (ref 11.8–14.4)
PLATELET # BLD: 213 K/UL (ref 138–453)
PLATELET ESTIMATE: ABNORMAL
PMV BLD AUTO: 9.7 FL (ref 8.1–13.5)
POTASSIUM SERPL-SCNC: 4 MMOL/L (ref 3.7–5.3)
RBC # BLD: 3.78 M/UL (ref 3.95–5.11)
RBC # BLD: ABNORMAL 10*6/UL
SEG NEUTROPHILS: 55 % (ref 36–65)
SEGMENTED NEUTROPHILS ABSOLUTE COUNT: 2.3 K/UL (ref 1.5–8.1)
SODIUM BLD-SCNC: 137 MMOL/L (ref 135–144)
WBC # BLD: 4.2 K/UL (ref 3.5–11.3)
WBC # BLD: ABNORMAL 10*3/UL

## 2019-11-06 PROCEDURE — 6370000000 HC RX 637 (ALT 250 FOR IP): Performed by: INTERNAL MEDICINE

## 2019-11-06 PROCEDURE — 71045 X-RAY EXAM CHEST 1 VIEW: CPT

## 2019-11-06 PROCEDURE — 80048 BASIC METABOLIC PNL TOTAL CA: CPT

## 2019-11-06 PROCEDURE — 6360000002 HC RX W HCPCS: Performed by: NURSE PRACTITIONER

## 2019-11-06 PROCEDURE — 85025 COMPLETE CBC W/AUTO DIFF WBC: CPT

## 2019-11-06 PROCEDURE — 1200000000 HC SEMI PRIVATE

## 2019-11-06 PROCEDURE — 2580000003 HC RX 258: Performed by: INTERNAL MEDICINE

## 2019-11-06 PROCEDURE — 83735 ASSAY OF MAGNESIUM: CPT

## 2019-11-06 PROCEDURE — 36415 COLL VENOUS BLD VENIPUNCTURE: CPT

## 2019-11-06 PROCEDURE — 99232 SBSQ HOSP IP/OBS MODERATE 35: CPT | Performed by: INTERNAL MEDICINE

## 2019-11-06 RX ADMIN — SODIUM CHLORIDE, PRESERVATIVE FREE 10 ML: 5 INJECTION INTRAVENOUS at 20:25

## 2019-11-06 RX ADMIN — KETOROLAC TROMETHAMINE 30 MG: 30 INJECTION, SOLUTION INTRAMUSCULAR at 22:34

## 2019-11-06 RX ADMIN — KETOROLAC TROMETHAMINE 30 MG: 30 INJECTION, SOLUTION INTRAMUSCULAR at 05:16

## 2019-11-06 RX ADMIN — ONDANSETRON 4 MG: 4 TABLET, ORALLY DISINTEGRATING ORAL at 12:48

## 2019-11-06 RX ADMIN — ONDANSETRON 4 MG: 4 TABLET, ORALLY DISINTEGRATING ORAL at 00:06

## 2019-11-06 RX ADMIN — HYDROCODONE BITARTRATE AND ACETAMINOPHEN 1 TABLET: 5; 325 TABLET ORAL at 09:18

## 2019-11-06 RX ADMIN — HYDROCODONE BITARTRATE AND ACETAMINOPHEN 2 TABLET: 5; 325 TABLET ORAL at 00:06

## 2019-11-06 RX ADMIN — SODIUM CHLORIDE, PRESERVATIVE FREE 10 ML: 5 INJECTION INTRAVENOUS at 09:00

## 2019-11-06 ASSESSMENT — PAIN DESCRIPTION - FREQUENCY
FREQUENCY: CONTINUOUS

## 2019-11-06 ASSESSMENT — PAIN DESCRIPTION - ORIENTATION
ORIENTATION: RIGHT

## 2019-11-06 ASSESSMENT — PAIN DESCRIPTION - ONSET
ONSET: SUDDEN
ONSET: ON-GOING
ONSET: ON-GOING

## 2019-11-06 ASSESSMENT — PAIN DESCRIPTION - PROGRESSION
CLINICAL_PROGRESSION: NOT CHANGED
CLINICAL_PROGRESSION: NOT CHANGED

## 2019-11-06 ASSESSMENT — PAIN - FUNCTIONAL ASSESSMENT
PAIN_FUNCTIONAL_ASSESSMENT: PREVENTS OR INTERFERES SOME ACTIVE ACTIVITIES AND ADLS
PAIN_FUNCTIONAL_ASSESSMENT: PREVENTS OR INTERFERES SOME ACTIVE ACTIVITIES AND ADLS

## 2019-11-06 ASSESSMENT — PAIN DESCRIPTION - LOCATION
LOCATION: RIB CAGE

## 2019-11-06 ASSESSMENT — PAIN DESCRIPTION - DESCRIPTORS
DESCRIPTORS: SHARP

## 2019-11-06 ASSESSMENT — PAIN DESCRIPTION - PAIN TYPE
TYPE: ACUTE PAIN

## 2019-11-06 ASSESSMENT — PAIN SCALES - GENERAL
PAINLEVEL_OUTOF10: 7
PAINLEVEL_OUTOF10: 3
PAINLEVEL_OUTOF10: 8
PAINLEVEL_OUTOF10: 7
PAINLEVEL_OUTOF10: 7
PAINLEVEL_OUTOF10: 9
PAINLEVEL_OUTOF10: 0
PAINLEVEL_OUTOF10: 7

## 2019-11-07 ENCOUNTER — APPOINTMENT (OUTPATIENT)
Dept: GENERAL RADIOLOGY | Age: 24
DRG: 135 | End: 2019-11-07
Payer: COMMERCIAL

## 2019-11-07 VITALS
BODY MASS INDEX: 22.12 KG/M2 | SYSTOLIC BLOOD PRESSURE: 98 MMHG | OXYGEN SATURATION: 99 % | WEIGHT: 120.2 LBS | DIASTOLIC BLOOD PRESSURE: 57 MMHG | HEART RATE: 60 BPM | HEIGHT: 62 IN | RESPIRATION RATE: 15 BRPM | TEMPERATURE: 98.1 F

## 2019-11-07 PROCEDURE — 2580000003 HC RX 258: Performed by: INTERNAL MEDICINE

## 2019-11-07 PROCEDURE — 71046 X-RAY EXAM CHEST 2 VIEWS: CPT

## 2019-11-07 PROCEDURE — 99232 SBSQ HOSP IP/OBS MODERATE 35: CPT | Performed by: INTERNAL MEDICINE

## 2019-11-07 PROCEDURE — 6370000000 HC RX 637 (ALT 250 FOR IP): Performed by: INTERNAL MEDICINE

## 2019-11-07 RX ORDER — HYDROCODONE BITARTRATE AND ACETAMINOPHEN 5; 325 MG/1; MG/1
2 TABLET ORAL EVERY 6 HOURS PRN
Qty: 40 TABLET | Refills: 0 | Status: SHIPPED | OUTPATIENT
Start: 2019-11-07 | End: 2019-11-14

## 2019-11-07 RX ADMIN — SODIUM CHLORIDE, PRESERVATIVE FREE 10 ML: 5 INJECTION INTRAVENOUS at 10:05

## 2019-11-07 RX ADMIN — ONDANSETRON 4 MG: 4 TABLET, ORALLY DISINTEGRATING ORAL at 03:03

## 2019-11-07 RX ADMIN — HYDROCODONE BITARTRATE AND ACETAMINOPHEN 2 TABLET: 5; 325 TABLET ORAL at 00:23

## 2019-11-07 RX ADMIN — HYDROCODONE BITARTRATE AND ACETAMINOPHEN 2 TABLET: 5; 325 TABLET ORAL at 06:31

## 2019-11-07 ASSESSMENT — PAIN DESCRIPTION - PAIN TYPE
TYPE: ACUTE PAIN

## 2019-11-07 ASSESSMENT — PAIN DESCRIPTION - FREQUENCY
FREQUENCY: CONTINUOUS

## 2019-11-07 ASSESSMENT — PAIN SCALES - GENERAL
PAINLEVEL_OUTOF10: 7
PAINLEVEL_OUTOF10: 3
PAINLEVEL_OUTOF10: 1
PAINLEVEL_OUTOF10: 2
PAINLEVEL_OUTOF10: 6
PAINLEVEL_OUTOF10: 7
PAINLEVEL_OUTOF10: 8
PAINLEVEL_OUTOF10: 6
PAINLEVEL_OUTOF10: 4

## 2019-11-07 ASSESSMENT — PAIN DESCRIPTION - PROGRESSION
CLINICAL_PROGRESSION: NOT CHANGED
CLINICAL_PROGRESSION: NOT CHANGED

## 2019-11-07 ASSESSMENT — PAIN DESCRIPTION - LOCATION
LOCATION: CHEST
LOCATION: BACK;RIB CAGE;SHOULDER
LOCATION: CHEST
LOCATION: BACK;RIB CAGE;SHOULDER

## 2019-11-07 ASSESSMENT — PAIN DESCRIPTION - ORIENTATION
ORIENTATION: RIGHT
ORIENTATION: RIGHT;MID;POSTERIOR
ORIENTATION: RIGHT;MID;POSTERIOR

## 2019-11-07 ASSESSMENT — PAIN DESCRIPTION - DESCRIPTORS
DESCRIPTORS: ACHING
DESCRIPTORS: SHARP
DESCRIPTORS: STABBING
DESCRIPTORS: ACHING;CONSTANT;NAGGING;SHARP
DESCRIPTORS: ACHING;CONSTANT;NAGGING;SHARP

## 2019-11-07 ASSESSMENT — PAIN - FUNCTIONAL ASSESSMENT
PAIN_FUNCTIONAL_ASSESSMENT: PREVENTS OR INTERFERES WITH MANY ACTIVE NOT PASSIVE ACTIVITIES
PAIN_FUNCTIONAL_ASSESSMENT: PREVENTS OR INTERFERES WITH MANY ACTIVE NOT PASSIVE ACTIVITIES

## 2019-11-07 ASSESSMENT — PAIN DESCRIPTION - ONSET
ONSET: ON-GOING

## 2019-11-20 ENCOUNTER — APPOINTMENT (OUTPATIENT)
Dept: GENERAL RADIOLOGY | Age: 24
End: 2019-11-20
Payer: COMMERCIAL

## 2019-11-20 ENCOUNTER — HOSPITAL ENCOUNTER (EMERGENCY)
Age: 24
Discharge: HOME | End: 2019-11-20
Attending: EMERGENCY MEDICINE
Payer: COMMERCIAL

## 2019-11-20 VITALS
HEIGHT: 62 IN | SYSTOLIC BLOOD PRESSURE: 102 MMHG | WEIGHT: 115 LBS | BODY MASS INDEX: 21.16 KG/M2 | DIASTOLIC BLOOD PRESSURE: 64 MMHG | HEART RATE: 85 BPM | OXYGEN SATURATION: 100 % | TEMPERATURE: 98.1 F | RESPIRATION RATE: 18 BRPM

## 2019-11-20 DIAGNOSIS — R05.9 COUGH: Primary | ICD-10-CM

## 2019-11-20 LAB
ABSOLUTE EOS #: 0.29 K/UL (ref 0–0.44)
ABSOLUTE IMMATURE GRANULOCYTE: <0.03 K/UL (ref 0–0.3)
ABSOLUTE LYMPH #: 1.5 K/UL (ref 1.1–3.7)
ABSOLUTE MONO #: 0.41 K/UL (ref 0.1–1.2)
BASOPHILS # BLD: 1 % (ref 0–2)
BASOPHILS ABSOLUTE: 0.04 K/UL (ref 0–0.2)
DIFFERENTIAL TYPE: ABNORMAL
EOSINOPHILS RELATIVE PERCENT: 5 % (ref 1–4)
HCG QUALITATIVE: NEGATIVE
HCT VFR BLD CALC: 37.3 % (ref 36.3–47.1)
HEMOGLOBIN: 11.7 G/DL (ref 11.9–15.1)
IMMATURE GRANULOCYTES: 0 %
LYMPHOCYTES # BLD: 25 % (ref 24–43)
MCH RBC QN AUTO: 26.4 PG (ref 25.2–33.5)
MCHC RBC AUTO-ENTMCNC: 31.4 G/DL (ref 28.4–34.8)
MCV RBC AUTO: 84.2 FL (ref 82.6–102.9)
MONOCYTES # BLD: 7 % (ref 3–12)
NRBC AUTOMATED: 0 PER 100 WBC
PDW BLD-RTO: 15 % (ref 11.8–14.4)
PLATELET # BLD: 325 K/UL (ref 138–453)
PLATELET ESTIMATE: ABNORMAL
PMV BLD AUTO: 9.9 FL (ref 8.1–13.5)
RBC # BLD: 4.43 M/UL (ref 3.95–5.11)
RBC # BLD: ABNORMAL 10*6/UL
SEG NEUTROPHILS: 62 % (ref 36–65)
SEGMENTED NEUTROPHILS ABSOLUTE COUNT: 3.81 K/UL (ref 1.5–8.1)
WBC # BLD: 6.1 K/UL (ref 3.5–11.3)
WBC # BLD: ABNORMAL 10*3/UL

## 2019-11-20 PROCEDURE — 71046 X-RAY EXAM CHEST 2 VIEWS: CPT

## 2019-11-20 PROCEDURE — 6370000000 HC RX 637 (ALT 250 FOR IP): Performed by: STUDENT IN AN ORGANIZED HEALTH CARE EDUCATION/TRAINING PROGRAM

## 2019-11-20 PROCEDURE — 84703 CHORIONIC GONADOTROPIN ASSAY: CPT

## 2019-11-20 PROCEDURE — 85025 COMPLETE CBC W/AUTO DIFF WBC: CPT

## 2019-11-20 PROCEDURE — 99283 EMERGENCY DEPT VISIT LOW MDM: CPT

## 2019-11-20 RX ORDER — BENZONATATE 100 MG/1
100 CAPSULE ORAL 3 TIMES DAILY PRN
Qty: 21 CAPSULE | Refills: 0 | Status: SHIPPED | OUTPATIENT
Start: 2019-11-20 | End: 2019-11-27

## 2019-11-20 RX ORDER — IBUPROFEN 400 MG/1
800 TABLET ORAL EVERY 6 HOURS PRN
Qty: 56 TABLET | Refills: 0 | Status: SHIPPED | OUTPATIENT
Start: 2019-11-20 | End: 2020-03-23

## 2019-11-20 RX ORDER — ACETAMINOPHEN 500 MG
1000 TABLET ORAL ONCE
Status: COMPLETED | OUTPATIENT
Start: 2019-11-20 | End: 2019-11-20

## 2019-11-20 RX ADMIN — ACETAMINOPHEN 1000 MG: 500 TABLET ORAL at 12:53

## 2019-11-20 ASSESSMENT — ENCOUNTER SYMPTOMS
BACK PAIN: 0
PHOTOPHOBIA: 0
ABDOMINAL PAIN: 0
SHORTNESS OF BREATH: 1
COUGH: 1
VOICE CHANGE: 0
WHEEZING: 0
DIARRHEA: 0
RHINORRHEA: 0
TROUBLE SWALLOWING: 0
EYE ITCHING: 0
NAUSEA: 0
CONSTIPATION: 0
EYE REDNESS: 0
VOMITING: 0
FACIAL SWELLING: 0
EYE PAIN: 0
SORE THROAT: 0

## 2019-11-20 ASSESSMENT — PAIN DESCRIPTION - ORIENTATION: ORIENTATION: RIGHT

## 2019-11-20 ASSESSMENT — PAIN DESCRIPTION - LOCATION: LOCATION: RIB CAGE

## 2019-11-20 ASSESSMENT — PAIN SCALES - GENERAL: PAINLEVEL_OUTOF10: 6

## 2019-11-21 ENCOUNTER — OFFICE VISIT (OUTPATIENT)
Dept: FAMILY MEDICINE CLINIC | Age: 24
End: 2019-11-21
Payer: COMMERCIAL

## 2019-11-21 VITALS
BODY MASS INDEX: 21.38 KG/M2 | WEIGHT: 116.2 LBS | OXYGEN SATURATION: 100 % | SYSTOLIC BLOOD PRESSURE: 99 MMHG | RESPIRATION RATE: 16 BRPM | DIASTOLIC BLOOD PRESSURE: 63 MMHG | HEIGHT: 62 IN | HEART RATE: 70 BPM

## 2019-11-21 DIAGNOSIS — R05.9 COUGH: Primary | ICD-10-CM

## 2019-11-21 PROCEDURE — 1036F TOBACCO NON-USER: CPT | Performed by: FAMILY MEDICINE

## 2019-11-21 PROCEDURE — 1111F DSCHRG MED/CURRENT MED MERGE: CPT | Performed by: FAMILY MEDICINE

## 2019-11-21 PROCEDURE — G8484 FLU IMMUNIZE NO ADMIN: HCPCS | Performed by: FAMILY MEDICINE

## 2019-11-21 PROCEDURE — G8427 DOCREV CUR MEDS BY ELIG CLIN: HCPCS | Performed by: FAMILY MEDICINE

## 2019-11-21 PROCEDURE — G8420 CALC BMI NORM PARAMETERS: HCPCS | Performed by: FAMILY MEDICINE

## 2019-11-21 PROCEDURE — 99203 OFFICE O/P NEW LOW 30 MIN: CPT | Performed by: FAMILY MEDICINE

## 2019-11-21 ASSESSMENT — PATIENT HEALTH QUESTIONNAIRE - PHQ9
SUM OF ALL RESPONSES TO PHQ QUESTIONS 1-9: 0
2. FEELING DOWN, DEPRESSED OR HOPELESS: 0
SUM OF ALL RESPONSES TO PHQ QUESTIONS 1-9: 0
SUM OF ALL RESPONSES TO PHQ9 QUESTIONS 1 & 2: 0
1. LITTLE INTEREST OR PLEASURE IN DOING THINGS: 0

## 2020-01-10 ENCOUNTER — TELEPHONE (OUTPATIENT)
Dept: FAMILY MEDICINE CLINIC | Age: 25
End: 2020-01-10

## 2020-01-16 ENCOUNTER — HOSPITAL ENCOUNTER (EMERGENCY)
Age: 25
Discharge: HOME | End: 2020-01-16
Attending: EMERGENCY MEDICINE
Payer: COMMERCIAL

## 2020-01-16 VITALS
DIASTOLIC BLOOD PRESSURE: 65 MMHG | SYSTOLIC BLOOD PRESSURE: 112 MMHG | BODY MASS INDEX: 20.8 KG/M2 | TEMPERATURE: 99.6 F | WEIGHT: 113 LBS | OXYGEN SATURATION: 99 % | RESPIRATION RATE: 16 BRPM | HEIGHT: 62 IN | HEART RATE: 105 BPM

## 2020-01-16 LAB
-: ABNORMAL
AMORPHOUS: ABNORMAL
APPEARANCE: CLEAR
BACTERIA: ABNORMAL
BILIRUBIN URINE: NEGATIVE
BILIRUBIN, POC: NORMAL
BLOOD URINE, POC: NORMAL
CASTS UA: ABNORMAL /LPF
CHP ED QC CHECK: NORMAL
CLARITY, POC: CLEAR
COLOR, POC: YELLOW
COLOR: YELLOW
COMMENT UA: ABNORMAL
CRYSTALS, UA: ABNORMAL /HPF
DIRECT EXAM: ABNORMAL
DIRECT EXAM: ABNORMAL
DIRECT EXAM: NORMAL
EPITHELIAL CELLS UA: ABNORMAL /HPF (ref 0–5)
GLUCOSE URINE, POC: NORMAL
GLUCOSE URINE: NEGATIVE
KETONES, POC: NORMAL
KETONES, URINE: NEGATIVE
LEUKOCYTE EST, POC: NORMAL
LEUKOCYTE ESTERASE, URINE: ABNORMAL
Lab: ABNORMAL
Lab: NORMAL
MUCUS: ABNORMAL
NITRITE, POC: NORMAL
NITRITE, URINE: NEGATIVE
OTHER OBSERVATIONS UA: ABNORMAL
PH UA: 8.5 (ref 5–8)
PH, POC: 8.5
PREGNANCY TEST URINE, POC: NEGATIVE
PROTEIN UA: NEGATIVE
PROTEIN, POC: NORMAL
RBC UA: ABNORMAL /HPF (ref 0–2)
RENAL EPITHELIAL, UA: ABNORMAL /HPF
SPECIFIC GRAVITY UA: 1.02 (ref 1–1.03)
SPECIFIC GRAVITY, POC: 1.02
SPECIMEN DESCRIPTION: ABNORMAL
SPECIMEN DESCRIPTION: NORMAL
TRICHOMONAS: ABNORMAL
TURBIDITY: ABNORMAL
URINE HGB: NEGATIVE
UROBILINOGEN, POC: 0.2
UROBILINOGEN, URINE: NORMAL
WBC UA: ABNORMAL /HPF (ref 0–5)
YEAST: ABNORMAL

## 2020-01-16 PROCEDURE — 81001 URINALYSIS AUTO W/SCOPE: CPT

## 2020-01-16 PROCEDURE — 87804 INFLUENZA ASSAY W/OPTIC: CPT

## 2020-01-16 PROCEDURE — 81025 URINE PREGNANCY TEST: CPT

## 2020-01-16 PROCEDURE — 87086 URINE CULTURE/COLONY COUNT: CPT

## 2020-01-16 PROCEDURE — 6360000002 HC RX W HCPCS: Performed by: NURSE PRACTITIONER

## 2020-01-16 PROCEDURE — 87880 STREP A ASSAY W/OPTIC: CPT

## 2020-01-16 PROCEDURE — 99283 EMERGENCY DEPT VISIT LOW MDM: CPT

## 2020-01-16 RX ORDER — IBUPROFEN 800 MG/1
800 TABLET ORAL EVERY 8 HOURS PRN
Qty: 20 TABLET | Refills: 0 | Status: SHIPPED | OUTPATIENT
Start: 2020-01-16 | End: 2020-03-23

## 2020-01-16 RX ORDER — AMOXICILLIN 500 MG/1
500 CAPSULE ORAL 3 TIMES DAILY
Qty: 30 CAPSULE | Refills: 0 | Status: SHIPPED | OUTPATIENT
Start: 2020-01-16 | End: 2020-01-26

## 2020-01-16 RX ORDER — DEXAMETHASONE SODIUM PHOSPHATE 10 MG/ML
10 INJECTION INTRAMUSCULAR; INTRAVENOUS ONCE
Status: COMPLETED | OUTPATIENT
Start: 2020-01-16 | End: 2020-01-16

## 2020-01-16 RX ORDER — ACETAMINOPHEN 500 MG
1000 TABLET ORAL EVERY 6 HOURS PRN
COMMUNITY
End: 2020-03-23

## 2020-01-16 RX ADMIN — DEXAMETHASONE SODIUM PHOSPHATE 10 MG: 10 INJECTION INTRAMUSCULAR; INTRAVENOUS at 12:56

## 2020-01-16 ASSESSMENT — ENCOUNTER SYMPTOMS
ABDOMINAL PAIN: 0
SORE THROAT: 1
BACK PAIN: 1
RHINORRHEA: 1
VOMITING: 0
NAUSEA: 0
COUGH: 0

## 2020-01-16 ASSESSMENT — PAIN DESCRIPTION - LOCATION: LOCATION: THROAT

## 2020-01-16 ASSESSMENT — PAIN DESCRIPTION - PAIN TYPE: TYPE: ACUTE PAIN

## 2020-01-16 ASSESSMENT — PAIN SCALES - GENERAL: PAINLEVEL_OUTOF10: 6

## 2020-01-16 ASSESSMENT — PAIN DESCRIPTION - DESCRIPTORS: DESCRIPTORS: SORE

## 2020-01-16 NOTE — ED PROVIDER NOTES
[address] [place] [address] ED  EMERGENCY DEPARTMENT ENCOUNTER      Pt Name: Timothy Evans  MRN: [id number]  [place] 1995  Date of evaluation: 2020  Provider: JARAD Porter CNP    CHIEF COMPLAINT       Chief Complaint   Patient presents with    Pharyngitis    Generalized Body Aches         HISTORY OFPRESENT ILLNESS  (Location/Symptom, Timing/Onset, Context/Setting, Quality, Duration, Modifying Factors, Severity.)   Timothy Evans is a 22 y.o. female who presents to the emergency department evaluation of sore throat and body aches that started yesterday. Pain is a 6 out of 10. No cough or shortness of breath. No abdominal pain. Complains of some lower back pain. Denies injury. Nursing Notes were reviewed. PASTMEDICAL HISTORY     Past Medical History:   Diagnosis Date    Allergic     codeine- anaphylaxis    allergy     codeine- anaphylaxis    Anemia 11/3/2019    Pyelonephritis 3/14/2015    Pyelonephritis          SURGICAL HISTORY       Past Surgical History:   Procedure Laterality Date     SECTION      TYMPANOSTOMY TUBE PLACEMENT           CURRENT MEDICATIONS     Discharge Medication List as of 2020  1:27 PM      CONTINUE these medications which have NOT CHANGED    Details   acetaminophen (TYLENOL) 500 MG tablet Take 1,000 mg by mouth every 6 hours as needed for PainHistorical Med             ALLERGIES     Patient has no known allergies.     FAMILY HISTORY       Family History   Problem Relation Age of Onset   [employer] COPD Mother     Other Mother         Tube insertion x2    No Known Problems Father     No Known Problems Brother           SOCIAL HISTORY       Social History     Socioeconomic History    Marital status: Single     Spouse name: None    Number of children: None    Years of education: None    Highest education level: None   Occupational History    None   Social Needs    Financial resource strain: None    Food insecurity:     Worry: None     Inability: None    Transportation needs:     Medical: None     Non-medical: None   Tobacco Use    Smoking status: Never Smoker    Smokeless tobacco: Never Used   Substance and Sexual Activity    Alcohol use: No    Drug use: No    Sexual activity: None   Lifestyle    Physical activity:     Days per week: None     Minutes per session: None    Stress: None   Relationships    Social connections:     Talks on phone: None     Gets together: None     Attends [employer] service: None     Active member of club or organization: None     Attends meetings of clubs or organizations: None     Relationship status: None    Intimate partner violence:     Fear of current or ex partner: None     Emotionally abused: None     Physically abused: None     Forced sexual activity: None   Other Topics Concern    None   Social History Narrative    None         REVIEW OF SYSTEMS    (2-9 systems for level 4, 10 or more for level 5)     Review of Systems   Constitutional: Positive for activity change, chills, fatigue and fever. HENT: Positive for congestion, rhinorrhea and sore throat. Respiratory: Negative for cough. Gastrointestinal: Negative for abdominal pain, nausea and vomiting. Musculoskeletal: Positive for back pain. All other systems reviewed and are negative. Except as noted above the remainder of the review of systems was reviewed and negative. PHYSICAL EXAM    (up to 7 for level 4, 8 or more for level 5)     ED Triage Vitals [01/16/20 1155]   BP Temp Temp Source Pulse Resp SpO2 Height Weight   112/65 99.6 °F (37.6 °C) Oral 105 16 99 % 5' 2\" (1.575 m) 113 lb (51.3 kg)       Physical Exam  Constitutional:       Appearance: Normal appearance. She is normal weight. HENT:      Head: Normocephalic and atraumatic. Right Ear: Hearing, tympanic membrane, ear canal and external ear normal.      Left Ear: Hearing, tympanic membrane, ear canal and external ear normal.      Nose: Mucosal edema and rhinorrhea present. Mouth/Throat:      Mouth: Mucous membranes are moist.      Pharynx: Uvula midline. Posterior oropharyngeal erythema present. No pharyngeal swelling or oropharyngeal exudate. Tonsils: No tonsillar exudate or tonsillar abscesses. Swelling: 3+ on the right. 3+ on the left. Neck:      Musculoskeletal: Full passive range of motion without pain, normal range of motion and neck supple. Cardiovascular:      Rate and Rhythm: Normal rate and regular rhythm. Pulmonary:      Effort: Pulmonary effort is normal. No respiratory distress. Musculoskeletal: Normal range of motion. Skin:     General: Skin is warm and dry. Capillary Refill: Capillary refill takes less than 2 seconds. Neurological:      Mental Status: She is alert and oriented to person, place, and time. GCS: GCS eye subscore is 4. GCS verbal subscore is 5. GCS motor subscore is 6. Cranial Nerves: Cranial nerves are intact. Sensory: Sensation is intact. Motor: Motor function is intact. Coordination: Coordination is intact. Gait: Gait is intact. Psychiatric:         Mood and Affect: Mood normal.         Behavior: Behavior normal.         Thought Content:  Thought content normal.         Judgment: Judgment normal.           DIAGNOSTIC RESULTS     EKG:All EKG's are interpreted by the Emergency Department Physician who either signs or Co-signs this chart in the absence of a cardiologist.      RADIOLOGY:   Non-plain film images such as CT, Ultrasound and MRI are read by theradiologist. Plain radiographic images are visualized and preliminarily interpreted by the emergency physician with the below findings:        Interpretation per the Radiologist below, if available at the time of this note:    No orders to display         EDBEDSIDE ULTRASOUND:   Performed by Ila Alexander - [facility]    LABS:  Labs Reviewed   STREP SCREEN GROUP A THROAT - Abnormal; Notable for the following components:       Result Value    Direct Exam POSITIVE for Group A Streptococci (*)     All other components within normal limits   URINE RT REFLEX TO CULTURE - Abnormal; Notable for the following components:    Turbidity UA SLIGHTLY CLOUDY (*)     pH, UA 8.5 (*)     Leukocyte Esterase, Urine TRACE (*)     All other components within normal limits   MICROSCOPIC URINALYSIS - Abnormal; Notable for the following components:    Amorphous, UA 1+ (*)     All other components within normal limits   POCT URINALYSIS DIPSTICK - Normal   POCT URINE PREGNANCY - Normal   RAPID INFLUENZA A/B ANTIGENS   URINE CULTURE CLEAN CATCH       All other labs were within normal range or not returned as of this dictation. EMERGENCY DEPARTMENT COURSE andDIFFERENTIAL DIAGNOSIS/MDM:   Pregnancy test negative. Urinalysis shows 2-5 white blood cells and a trace leukocytes. Urine sent for culture. Patient is positive for strep pharyngitis. Examination shows 3+ swelling to both tonsils. No asymmetry. I do Not suspect peritonsillar abscess. Patient is controlling her secretions. Prescriptions are written for amoxicillin, Motrin and lozenges. Instructed follow-up with her doctor. Return to the ED if symptoms worsen. Vitals:    Vitals:    01/16/20 1155   BP: 112/65   Pulse: 105   Resp: 16   Temp: 99.6 °F (37.6 °C)   TempSrc: Oral   SpO2: 99%   Weight: 113 lb (51.3 kg)   Height: 5' 2\" (1.575 m)         CONSULTS:  None    RES:  Procedures    FINAL IMPRESSION      1.  Strep pharyngitis          DISPOSITION/PLAN   DISPOSITION Decision To Discharge 01/16/2020 01:25:37 PM      PATIENT REFERRED TO:   Delmis Thompson DO  [address] [place] [address] 22.  [phone]    In 1 week      [address] [place] [address] ED  [facility]  [phone]    If symptoms worsen      DISCHARGE MEDICATIONS:     Discharge Medication List as of 1/16/2020  1:27 PM      START taking these medications    Details   amoxicillin (AMOXIL) 500 MG capsule Take 1 capsule by mouth 3 times daily for 10 days, Disp-30 capsule, R-0Print      Benzocaine-Menthol (SORE THROAT LOZENGES) 6-10 MG LOZG lozenge Take 1 lozenge by mouth every 2 hours as needed for Sore Throat, Disp-36 lozenge, R-0Print           Electronically signed by JARAD Cassidy 1/16/2020 at 2:19 PM           JARAD Cassidy CNP  01/16/20 1138

## 2020-01-17 ENCOUNTER — CARE COORDINATION (OUTPATIENT)
Dept: CARE COORDINATION | Age: 25
End: 2020-01-17

## 2020-01-17 LAB
CULTURE: NORMAL
HCG, PREGNANCY URINE (POC): NEGATIVE
Lab: NORMAL
SPECIMEN DESCRIPTION: NORMAL

## 2020-01-17 NOTE — CARE COORDINATION
Ambulatory Care Coordination ED Follow up Call       Reason for ED Visit:  Strep pharyngitis   Care Management Risk Score: CMRS 5    Patient was called to follow up with most recent ER visit. There was no answer. A message was left on voicemail to have patient call back regarding ER visit. Office number given [phone]. FU appts/Provider:    No future appointments.     Health Maintenance Due   Topic Date Due    Varicella Vaccine (1 of 2 - 2-dose childhood series) 11/15/1997    DTaP/Tdap/Td vaccine (1 - Tdap) 11/15/2007    HPV vaccine (2 - Female 3-dose series) 06/22/2012    Chlamydia screen  11/21/2015    Cervical cancer screen  11/15/2017    Flu vaccine (1) 09/01/2019

## 2020-01-27 PROCEDURE — 99283 EMERGENCY DEPT VISIT LOW MDM: CPT

## 2020-01-28 ENCOUNTER — HOSPITAL ENCOUNTER (EMERGENCY)
Facility: HOSPITAL | Age: 25
Discharge: HOME | End: 2020-01-28
Attending: EMERGENCY MEDICINE | Admitting: EMERGENCY MEDICINE
Payer: COMMERCIAL

## 2020-01-28 ENCOUNTER — APPOINTMENT (EMERGENCY)
Dept: RADIOLOGY | Facility: HOSPITAL | Age: 25
End: 2020-01-28
Payer: COMMERCIAL

## 2020-01-28 VITALS
BODY MASS INDEX: 26.22 KG/M2 | DIASTOLIC BLOOD PRESSURE: 57 MMHG | SYSTOLIC BLOOD PRESSURE: 121 MMHG | TEMPERATURE: 98.1 F | RESPIRATION RATE: 16 BRPM | HEIGHT: 61 IN | HEART RATE: 88 BPM | OXYGEN SATURATION: 99 % | WEIGHT: 138.89 LBS

## 2020-01-28 DIAGNOSIS — J06.9 URI (UPPER RESPIRATORY INFECTION): Primary | ICD-10-CM

## 2020-01-28 PROCEDURE — 99282 EMERGENCY DEPT VISIT SF MDM: CPT | Performed by: EMERGENCY MEDICINE

## 2020-01-28 RX ORDER — OXYMETAZOLINE HYDROCHLORIDE 0.05 G/100ML
2 SPRAY NASAL ONCE
Status: COMPLETED | OUTPATIENT
Start: 2020-01-28 | End: 2020-01-28

## 2020-01-28 RX ADMIN — OXYMETAZOLINE HYDROCHLORIDE 2 SPRAY: 5 SPRAY NASAL at 00:41

## 2020-01-28 NOTE — ED PROVIDER NOTES
History  Chief Complaint   Patient presents with    Nasal Congestion     c/o nasal congestion and cough productive of green sputum x2 days  This is an otherwise healthy 36-year-old female who presents with viral URI type symptoms  Over the past 2 days, the patient has been experiencing a runny nose and cough productive of green sputum  She has not tried taking anything for the pain  No sick contacts that she is aware of  Denies fever/chills, nausea/vomiting, neck pain, chest pain, palpitations, shortness of breath, neck pain, back pain, flank pain, abdominal pain, diarrhea, hematochezia, dysuria, hematuria, abnormal vaginal discharge/bleeding  Prior to Admission Medications   Prescriptions Last Dose Informant Patient Reported? Taking? Prenatal Vit-Fe Fumarate-FA (PRENATAL VITAMIN) 28-0 8 mg  Self Yes No   Sig: Take 1 tablet by mouth daily      Facility-Administered Medications: None       Past Medical History:   Diagnosis Date         Miscarriage 2017    miscarriage x1    Urinary tract infection     Hx UTI x1 in the past    Varicella     recv'd vaccine       Past Surgical History:   Procedure Laterality Date    ELBOW SURGERY Right     ELBOW SURGERY Right     from elbow and arm fracture    FRACTURE SURGERY      INDUCED          Family History   Problem Relation Age of Onset    Cancer Mother     No Known Problems Father     No Known Problems Sister     No Known Problems Brother     Diabetes Paternal Grandfather     Breast cancer Family      I have reviewed and agree with the history as documented  Social History     Tobacco Use    Smoking status: Former Smoker    Smokeless tobacco: Never Used   Substance Use Topics    Alcohol use: No    Drug use: No        Review of Systems   Constitutional: Negative for chills and fever  HENT: Positive for congestion and rhinorrhea  Negative for sore throat and trouble swallowing  Respiratory: Positive for cough  Negative for chest tightness, shortness of breath and wheezing  Cardiovascular: Negative for chest pain and palpitations  Gastrointestinal: Negative for abdominal pain, blood in stool, diarrhea, nausea and vomiting  Musculoskeletal: Negative for back pain and neck pain  All other systems reviewed and are negative  Physical Exam  Physical Exam   Constitutional: Vital signs are normal  She appears well-developed and well-nourished  She is cooperative  Non-toxic appearance  She does not appear ill  HENT:   Head: Normocephalic  Nose: Rhinorrhea present  Mouth/Throat: Uvula is midline, oropharynx is clear and moist and mucous membranes are normal  No tonsillar exudate  Eyes: Pupils are equal, round, and reactive to light  Conjunctivae, EOM and lids are normal    Cardiovascular: Normal rate, regular rhythm, normal heart sounds, intact distal pulses and normal pulses  Pulses:       Radial pulses are 2+ on the right side, and 2+ on the left side  Pulmonary/Chest: Effort normal and breath sounds normal    Abdominal: Soft  Normal appearance and bowel sounds are normal  There is no tenderness  There is no rigidity, no rebound, no guarding and no CVA tenderness  Neurological: She is alert  Psychiatric: She has a normal mood and affect   Her speech is normal and behavior is normal        Vital Signs  ED Triage Vitals [01/28/20 0014]   Temperature Pulse Respirations Blood Pressure SpO2   98 1 °F (36 7 °C) 88 16 121/57 99 %      Temp Source Heart Rate Source Patient Position - Orthostatic VS BP Location FiO2 (%)   Temporal Monitor Sitting Left arm --      Pain Score       No Pain           Vitals:    01/28/20 0014   BP: 121/57   Pulse: 88   Patient Position - Orthostatic VS: Sitting         Visual Acuity      ED Medications  Medications   oxymetazoline (AFRIN) 0 05 % nasal spray 2 spray (2 sprays Each Nare Given 1/28/20 0041)       Diagnostic Studies  Results Reviewed     None                 XR chest 2 views    (Results Pending)              Procedures  Procedures         ED Course                               MDM  Number of Diagnoses or Management Options  Diagnosis management comments: Likely viral URI  Will give Afrin for runny nose  Chest x-ray  Follow-up family doctor  Disposition  Final diagnoses:   URI (upper respiratory infection)     Time reflects when diagnosis was documented in both MDM as applicable and the Disposition within this note     Time User Action Codes Description Comment    1/28/2020 12:48 AM Maryellen CRENSHAW Add [J06 9] URI (upper respiratory infection)       ED Disposition     ED Disposition Condition Date/Time Comment    Discharge Stable Tue Jan 28, 2020 12:48 AM Stacey Amaya discharge to home/self care  Follow-up Information     Follow up With Specialties Details Why Contact Info Additional [address] [facility] Emergency Department Emergency Medicine Go to  [facility] symptoms wor[facility] [address] [phone] [place] ED, [address], [address]          Discharge Medication List as of 1/28/2020 12:58 AM      CONTINUE these medications which have NOT CHANGED    Details   Prenatal Vit-Fe Fumarate-FA (PRENATAL VITAMIN) 28-0 8 mg Take 1 tablet by mouth daily, Historical Med           No discharge procedures on file      ED Provider  Electronically Signed by           Dmitri Arizmendi MD  01/28/20 0275

## 2020-01-30 ENCOUNTER — HOSPITAL ENCOUNTER (EMERGENCY)
Facility: HOSPITAL | Age: 25
Discharge: HOME | End: 2020-01-30
Attending: EMERGENCY MEDICINE | Admitting: EMERGENCY MEDICINE
Payer: COMMERCIAL

## 2020-01-30 ENCOUNTER — APPOINTMENT (EMERGENCY)
Dept: CT IMAGING | Facility: HOSPITAL | Age: 25
End: 2020-01-30
Payer: COMMERCIAL

## 2020-01-30 VITALS
HEART RATE: 83 BPM | SYSTOLIC BLOOD PRESSURE: 119 MMHG | TEMPERATURE: 98.4 F | WEIGHT: 141 LBS | DIASTOLIC BLOOD PRESSURE: 71 MMHG | RESPIRATION RATE: 21 BRPM | OXYGEN SATURATION: 93 % | BODY MASS INDEX: 26.64 KG/M2

## 2020-01-30 DIAGNOSIS — M54.6 BACK PAIN, THORACIC: Primary | ICD-10-CM

## 2020-01-30 DIAGNOSIS — N39.0 UTI (URINARY TRACT INFECTION): ICD-10-CM

## 2020-01-30 LAB
BACTERIA UR QL AUTO: ABNORMAL /HPF
BILIRUB UR QL STRIP: NEGATIVE
CLARITY UR: CLEAR
COLOR UR: YELLOW
EXT PREG TEST URINE: NEGATIVE
EXT. CONTROL ED NAV: NORMAL
GLUCOSE UR STRIP-MCNC: NEGATIVE MG/DL
HGB UR QL STRIP.AUTO: ABNORMAL
KETONES UR STRIP-MCNC: NEGATIVE MG/DL
LEUKOCYTE ESTERASE UR QL STRIP: ABNORMAL
NITRITE UR QL STRIP: NEGATIVE
NON-SQ EPI CELLS URNS QL MICRO: ABNORMAL /HPF
PH UR STRIP.AUTO: 5.5 [PH]
PROT UR STRIP-MCNC: NEGATIVE MG/DL
RBC #/AREA URNS AUTO: ABNORMAL /HPF
SP GR UR STRIP.AUTO: >=1.03 (ref 1–1.03)
UROBILINOGEN UR QL STRIP.AUTO: 0.2 E.U./DL
WBC #/AREA URNS AUTO: ABNORMAL /HPF

## 2020-01-30 PROCEDURE — 99284 EMERGENCY DEPT VISIT MOD MDM: CPT

## 2020-01-30 PROCEDURE — 81001 URINALYSIS AUTO W/SCOPE: CPT | Performed by: PHYSICIAN ASSISTANT

## 2020-01-30 PROCEDURE — 72131 CT LUMBAR SPINE W/O DYE: CPT

## 2020-01-30 PROCEDURE — 72128 CT CHEST SPINE W/O DYE: CPT

## 2020-01-30 PROCEDURE — 81025 URINE PREGNANCY TEST: CPT | Performed by: PHYSICIAN ASSISTANT

## 2020-01-30 PROCEDURE — 99284 EMERGENCY DEPT VISIT MOD MDM: CPT | Performed by: PHYSICIAN ASSISTANT

## 2020-01-30 PROCEDURE — 72125 CT NECK SPINE W/O DYE: CPT

## 2020-01-30 RX ORDER — CEPHALEXIN 500 MG/1
500 CAPSULE ORAL EVERY 12 HOURS SCHEDULED
Qty: 10 CAPSULE | Refills: 0 | Status: SHIPPED | OUTPATIENT
Start: 2020-01-30 | End: 2020-02-04

## 2020-01-30 RX ORDER — ACETAMINOPHEN 325 MG/1
650 TABLET ORAL EVERY 6 HOURS PRN
COMMUNITY
End: 2021-08-02

## 2020-01-30 RX ORDER — ACETAMINOPHEN 325 MG/1
650 TABLET ORAL ONCE
Status: COMPLETED | OUTPATIENT
Start: 2020-01-30 | End: 2020-01-30

## 2020-01-30 RX ADMIN — ACETAMINOPHEN 650 MG: 325 TABLET, FILM COATED ORAL at 18:57

## 2020-01-30 NOTE — CASE MANAGEMENT
I self referred the patient do to her not having a PCP  I provided her with a list of local PCP's and the [facility] health clinics  I discussed resources with her she denied needing any help at this time  The patient lives in a 1st floor apartment with her boyfriend  There is 2 TRISH  She has no DME  She does not receive meals on wheels or home health products at this time  She is independent with her ADL's  She does not drive her boyfriend drives  She uses [employer] in [facility]  She has no trouble getting or paying for her medications  She has [employer]
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
## 2020-01-31 NOTE — DISCHARGE INSTRUCTIONS
Take ibuprofen as needed for pain  Follow up with PT spine  If condition worsens, follow up with spine doctor  Return to ED if condition worsens  Take Keflex as directed

## 2020-03-13 ENCOUNTER — CARE COORDINATION (OUTPATIENT)
Dept: CARE COORDINATION | Age: 25
End: 2020-03-13

## 2020-03-23 ENCOUNTER — HOSPITAL ENCOUNTER (EMERGENCY)
Age: 25
Discharge: HOME | End: 2020-03-23
Attending: EMERGENCY MEDICINE
Payer: COMMERCIAL

## 2020-03-23 VITALS
SYSTOLIC BLOOD PRESSURE: 110 MMHG | RESPIRATION RATE: 12 BRPM | HEART RATE: 76 BPM | HEIGHT: 62 IN | DIASTOLIC BLOOD PRESSURE: 71 MMHG | TEMPERATURE: 98.2 F | OXYGEN SATURATION: 100 % | WEIGHT: 115 LBS | BODY MASS INDEX: 21.16 KG/M2

## 2020-03-23 LAB
DIRECT EXAM: NORMAL
Lab: NORMAL
SPECIMEN DESCRIPTION: NORMAL

## 2020-03-23 PROCEDURE — 6360000002 HC RX W HCPCS: Performed by: NURSE PRACTITIONER

## 2020-03-23 PROCEDURE — 87880 STREP A ASSAY W/OPTIC: CPT

## 2020-03-23 PROCEDURE — 99283 EMERGENCY DEPT VISIT LOW MDM: CPT

## 2020-03-23 PROCEDURE — 96372 THER/PROPH/DIAG INJ SC/IM: CPT

## 2020-03-23 RX ORDER — DEXAMETHASONE SODIUM PHOSPHATE 10 MG/ML
10 INJECTION, SOLUTION INTRAMUSCULAR; INTRAVENOUS ONCE
Status: COMPLETED | OUTPATIENT
Start: 2020-03-23 | End: 2020-03-23

## 2020-03-23 RX ADMIN — DEXAMETHASONE SODIUM PHOSPHATE 10 MG: 10 INJECTION INTRAMUSCULAR; INTRAVENOUS at 13:12

## 2020-03-23 ASSESSMENT — PAIN SCALES - GENERAL: PAINLEVEL_OUTOF10: 7

## 2020-03-23 ASSESSMENT — ENCOUNTER SYMPTOMS
EYE DISCHARGE: 0
SINUS PRESSURE: 1
CHEST TIGHTNESS: 0
COUGH: 0
DIARRHEA: 0
VOMITING: 0
SINUS PAIN: 1
ABDOMINAL PAIN: 0
SORE THROAT: 1
NAUSEA: 0
CONSTIPATION: 0
RHINORRHEA: 1
SHORTNESS OF BREATH: 0
WHEEZING: 0

## 2020-03-23 ASSESSMENT — PAIN DESCRIPTION - DESCRIPTORS: DESCRIPTORS: SORE

## 2020-03-23 ASSESSMENT — PAIN DESCRIPTION - PAIN TYPE: TYPE: ACUTE PAIN

## 2020-03-23 ASSESSMENT — PAIN DESCRIPTION - LOCATION: LOCATION: THROAT

## 2020-03-23 NOTE — ED PROVIDER NOTES
[id number] [facility] ED  [address] [facility] [address]  Phone: [phone]  Fax: [phone]      Pt Name: Kiley Zavaleta  MRN: [id number]  [place] 1995  Date of evaluation: 3/23/2020      CHIEF COMPLAINT       Chief Complaint   Patient presents with    Pharyngitis       HISTORY OF PRESENT ILLNESS   (Location, Quality, Severity, Duration, Timing, Context, ModifyingFactors, Associated Signs and Symptoms)     Kiley Zavaleta is a 22 y.o. female who presents planes of sore throat for the past 3 days. Patient reports multiple episodes of strep throat in the past.  Reports symptoms feel very similar to this, pain with swallowing and eating. Patient denies any fevers or chills. Any chest pain or shortness of breath. Patient complains of sinus pressure and sinus headaches. No ear pain. Patient with nonlabored respirations, no respiratory issue, no swelling of tongue. Nursing Notes were reviewed. REVIEW OF SYSTEMS     (2-9 systems for level 4, 10 or more for level 5)    Review of Systems   Constitutional: Negative for chills, fatigue and fever. HENT: Positive for postnasal drip, rhinorrhea, sinus pressure, sinus pain and sore throat. Negative for congestion and ear pain. Eyes: Negative for discharge. Respiratory: Negative for cough, chest tightness, shortness of breath and wheezing. Cardiovascular: Negative for chest pain, palpitations and leg swelling. Gastrointestinal: Negative for abdominal pain, constipation, diarrhea, nausea and vomiting. Musculoskeletal: Negative for neck pain and neck stiffness. Skin: Negative for rash. Neurological: Negative for dizziness, light-headedness, numbness and headaches. PAST MEDICAL HISTORY    has a past medical history of Allergic, allergy, Anemia, Pyelonephritis, and Pyelonephritis. SURGICAL HISTORY      has a past surgical history that includes Tympanostomy tube placement and  section.     CURRENT MEDICATIONS       Previous Medications    No medications on file       ALLERGIES     has No Known Allergies. FAMILY HISTORY     She indicated that her mother is alive. She indicated that her father is alive. She indicated that her brother is alive. family history includes COPD in her mother; No Known Problems in her brother and father; Other in her mother. SOCIAL HISTORY      reports that she has never smoked. She has never used smokeless tobacco. She reports that she does not drink alcohol or use drugs. PHYSICAL EXAM     (7 for level 4, 8 or more for level 5)    ED Triage Vitals [03/23/20 1231]   BP Temp Temp Source Pulse Resp SpO2 Height Weight   110/71 98.2 °F (36.8 °C) Oral 76 12 100 % 5' 2\" (1.575 m) 115 lb (52.2 kg)       Physical Exam  Vitals signs reviewed. Constitutional:       General: She is awake. Appearance: Normal appearance. She is well-developed. HENT:      Head: Normocephalic and atraumatic. Right Ear: Tympanic membrane normal.      Left Ear: Tympanic membrane normal.      Mouth/Throat:      Pharynx: Pharyngeal swelling, oropharyngeal exudate and posterior oropharyngeal erythema present. No uvula swelling. Tonsils: No tonsillar abscesses. 2+ on the right. 2+ on the left. Neck:      Musculoskeletal: Full passive range of motion without pain. Cardiovascular:      Rate and Rhythm: Normal rate and regular rhythm. Heart sounds: Normal heart sounds. Pulmonary:      Effort: Pulmonary effort is normal.      Breath sounds: Normal breath sounds. Abdominal:      General: Abdomen is flat. Bowel sounds are normal. There is no distension. Palpations: Abdomen is soft. There is no mass. Tenderness: There is no abdominal tenderness. There is no guarding. Musculoskeletal: Normal range of motion. Right lower leg: No edema. Left lower leg: No edema. Lymphadenopathy:      Cervical: Cervical adenopathy present. Skin:     General: Skin is warm and dry. (Please note that portions of this note were completed with a voice recognition program.  Efforts were made to edit the dictations but occasionally words are mis-transcribed.)    JARAD Alva - CNP  03/23/20 4856

## 2020-03-24 ENCOUNTER — CARE COORDINATION (OUTPATIENT)
Dept: CARE COORDINATION | Age: 25
End: 2020-03-24

## 2020-03-24 NOTE — CARE COORDINATION
Second attempt to reach pt for covid risk education and care coordination assessment left vm to call [facility] [phone]

## 2020-03-25 NOTE — CARE COORDINATION
Third attempt to reach pt for covid risk education and care coordination assessment left vm to call [facility] [phone]

## 2020-04-14 ENCOUNTER — HOSPITAL ENCOUNTER (EMERGENCY)
Facility: HOSPITAL | Age: 25
Discharge: HOME | End: 2020-04-14
Attending: EMERGENCY MEDICINE | Admitting: EMERGENCY MEDICINE
Payer: COMMERCIAL

## 2020-04-14 VITALS
TEMPERATURE: 98 F | WEIGHT: 140 LBS | HEIGHT: 61 IN | HEART RATE: 67 BPM | SYSTOLIC BLOOD PRESSURE: 113 MMHG | RESPIRATION RATE: 16 BRPM | BODY MASS INDEX: 26.43 KG/M2 | DIASTOLIC BLOOD PRESSURE: 70 MMHG | OXYGEN SATURATION: 99 %

## 2020-04-14 DIAGNOSIS — O21.9 VOMITING OR NAUSEA OF PREGNANCY: Primary | ICD-10-CM

## 2020-04-14 DIAGNOSIS — R82.71 BACTERIURIA: ICD-10-CM

## 2020-04-14 LAB
ALBUMIN SERPL BCP-MCNC: 3.8 G/DL (ref 3.5–5)
ALP SERPL-CCNC: 42 U/L (ref 46–116)
ALT SERPL W P-5'-P-CCNC: 13 U/L (ref 12–78)
ANION GAP SERPL CALCULATED.3IONS-SCNC: 10 MMOL/L (ref 4–13)
AST SERPL W P-5'-P-CCNC: 10 U/L (ref 5–45)
BACTERIA UR QL AUTO: ABNORMAL /HPF
BASOPHILS # BLD AUTO: 0.03 THOUSANDS/ΜL (ref 0–0.1)
BASOPHILS NFR BLD AUTO: 0 % (ref 0–1)
BILIRUB SERPL-MCNC: 0.5 MG/DL (ref 0.2–1)
BILIRUB UR QL STRIP: NEGATIVE
BUN SERPL-MCNC: 9 MG/DL (ref 5–25)
CALCIUM SERPL-MCNC: 9.1 MG/DL (ref 8.3–10.1)
CHLORIDE SERPL-SCNC: 102 MMOL/L (ref 100–108)
CLARITY UR: ABNORMAL
CO2 SERPL-SCNC: 25 MMOL/L (ref 21–32)
COLOR UR: YELLOW
CREAT SERPL-MCNC: 0.73 MG/DL (ref 0.6–1.3)
EOSINOPHIL # BLD AUTO: 0.18 THOUSAND/ΜL (ref 0–0.61)
EOSINOPHIL NFR BLD AUTO: 2 % (ref 0–6)
ERYTHROCYTE [DISTWIDTH] IN BLOOD BY AUTOMATED COUNT: 12.3 % (ref 11.6–15.1)
EXT PREG TEST URINE: POSITIVE
EXT. CONTROL ED NAV: ABNORMAL
GFR SERPL CREATININE-BSD FRML MDRD: 134 ML/MIN/1.73SQ M
GLUCOSE SERPL-MCNC: 101 MG/DL (ref 65–140)
GLUCOSE UR STRIP-MCNC: NEGATIVE MG/DL
HCT VFR BLD AUTO: 34.4 % (ref 34.8–46.1)
HGB BLD-MCNC: 11.4 G/DL (ref 11.5–15.4)
HGB UR QL STRIP.AUTO: NEGATIVE
IMM GRANULOCYTES # BLD AUTO: 0.03 THOUSAND/UL (ref 0–0.2)
IMM GRANULOCYTES NFR BLD AUTO: 0 % (ref 0–2)
KETONES UR STRIP-MCNC: NEGATIVE MG/DL
LEUKOCYTE ESTERASE UR QL STRIP: ABNORMAL
LYMPHOCYTES # BLD AUTO: 2.32 THOUSANDS/ΜL (ref 0.6–4.47)
LYMPHOCYTES NFR BLD AUTO: 24 % (ref 14–44)
MAGNESIUM SERPL-MCNC: 1.9 MG/DL (ref 1.6–2.6)
MCH RBC QN AUTO: 30.1 PG (ref 26.8–34.3)
MCHC RBC AUTO-ENTMCNC: 33.1 G/DL (ref 31.4–37.4)
MCV RBC AUTO: 91 FL (ref 82–98)
MONOCYTES # BLD AUTO: 0.65 THOUSAND/ΜL (ref 0.17–1.22)
MONOCYTES NFR BLD AUTO: 7 % (ref 4–12)
NEUTROPHILS # BLD AUTO: 6.42 THOUSANDS/ΜL (ref 1.85–7.62)
NEUTS SEG NFR BLD AUTO: 67 % (ref 43–75)
NITRITE UR QL STRIP: NEGATIVE
NON-SQ EPI CELLS URNS QL MICRO: ABNORMAL /HPF
NRBC BLD AUTO-RTO: 0 /100 WBCS
PH UR STRIP.AUTO: 6 [PH]
PLATELET # BLD AUTO: 249 THOUSANDS/UL (ref 149–390)
PMV BLD AUTO: 10.3 FL (ref 8.9–12.7)
POTASSIUM SERPL-SCNC: 3.5 MMOL/L (ref 3.5–5.3)
PROT SERPL-MCNC: 7.3 G/DL (ref 6.4–8.2)
PROT UR STRIP-MCNC: NEGATIVE MG/DL
RBC # BLD AUTO: 3.79 MILLION/UL (ref 3.81–5.12)
RBC #/AREA URNS AUTO: ABNORMAL /HPF
SODIUM SERPL-SCNC: 137 MMOL/L (ref 136–145)
SP GR UR STRIP.AUTO: 1.01 (ref 1–1.03)
UROBILINOGEN UR QL STRIP.AUTO: 0.2 E.U./DL
WBC # BLD AUTO: 9.63 THOUSAND/UL (ref 4.31–10.16)
WBC #/AREA URNS AUTO: ABNORMAL /HPF

## 2020-04-14 PROCEDURE — 87086 URINE CULTURE/COLONY COUNT: CPT | Performed by: EMERGENCY MEDICINE

## 2020-04-14 PROCEDURE — 87077 CULTURE AEROBIC IDENTIFY: CPT | Performed by: EMERGENCY MEDICINE

## 2020-04-14 PROCEDURE — 81001 URINALYSIS AUTO W/SCOPE: CPT | Performed by: EMERGENCY MEDICINE

## 2020-04-14 PROCEDURE — 96374 THER/PROPH/DIAG INJ IV PUSH: CPT

## 2020-04-14 PROCEDURE — 99284 EMERGENCY DEPT VISIT MOD MDM: CPT | Performed by: EMERGENCY MEDICINE

## 2020-04-14 PROCEDURE — 81025 URINE PREGNANCY TEST: CPT | Performed by: EMERGENCY MEDICINE

## 2020-04-14 PROCEDURE — 96361 HYDRATE IV INFUSION ADD-ON: CPT

## 2020-04-14 PROCEDURE — 36415 COLL VENOUS BLD VENIPUNCTURE: CPT | Performed by: EMERGENCY MEDICINE

## 2020-04-14 PROCEDURE — 96375 TX/PRO/DX INJ NEW DRUG ADDON: CPT

## 2020-04-14 PROCEDURE — 80053 COMPREHEN METABOLIC PANEL: CPT | Performed by: EMERGENCY MEDICINE

## 2020-04-14 PROCEDURE — 83735 ASSAY OF MAGNESIUM: CPT | Performed by: EMERGENCY MEDICINE

## 2020-04-14 PROCEDURE — 85025 COMPLETE CBC W/AUTO DIFF WBC: CPT | Performed by: EMERGENCY MEDICINE

## 2020-04-14 PROCEDURE — 87186 SC STD MICRODIL/AGAR DIL: CPT | Performed by: EMERGENCY MEDICINE

## 2020-04-14 PROCEDURE — 99284 EMERGENCY DEPT VISIT MOD MDM: CPT

## 2020-04-14 RX ORDER — METOCLOPRAMIDE HYDROCHLORIDE 5 MG/ML
5 INJECTION INTRAMUSCULAR; INTRAVENOUS ONCE
Status: COMPLETED | OUTPATIENT
Start: 2020-04-14 | End: 2020-04-14

## 2020-04-14 RX ORDER — CEPHALEXIN 500 MG/1
500 CAPSULE ORAL 2 TIMES DAILY
Qty: 14 CAPSULE | Refills: 0 | Status: SHIPPED | OUTPATIENT
Start: 2020-04-14 | End: 2020-04-21

## 2020-04-14 RX ORDER — CEPHALEXIN 250 MG/1
500 CAPSULE ORAL ONCE
Status: COMPLETED | OUTPATIENT
Start: 2020-04-14 | End: 2020-04-14

## 2020-04-14 RX ORDER — DIPHENHYDRAMINE HYDROCHLORIDE 50 MG/ML
25 INJECTION INTRAMUSCULAR; INTRAVENOUS ONCE
Status: COMPLETED | OUTPATIENT
Start: 2020-04-14 | End: 2020-04-14

## 2020-04-14 RX ORDER — PNV NO.95/FERROUS FUM/FOLIC AC 28MG-0.8MG
1 TABLET ORAL DAILY
Qty: 90 TABLET | Refills: 0 | Status: SHIPPED | OUTPATIENT
Start: 2020-04-14 | End: 2021-08-02

## 2020-04-14 RX ORDER — METOCLOPRAMIDE 10 MG/1
5 TABLET ORAL EVERY 6 HOURS PRN
Qty: 30 TABLET | Refills: 0 | Status: SHIPPED | OUTPATIENT
Start: 2020-04-14 | End: 2021-08-02

## 2020-04-14 RX ORDER — DIPHENHYDRAMINE HCL 25 MG
25 TABLET ORAL EVERY 6 HOURS PRN
Qty: 20 TABLET | Refills: 0 | Status: SHIPPED | OUTPATIENT
Start: 2020-04-14 | End: 2021-08-02

## 2020-04-14 RX ADMIN — SODIUM CHLORIDE 1000 ML: 0.9 INJECTION, SOLUTION INTRAVENOUS at 16:29

## 2020-04-14 RX ADMIN — CEPHALEXIN 500 MG: 250 CAPSULE ORAL at 18:02

## 2020-04-14 RX ADMIN — DIPHENHYDRAMINE HYDROCHLORIDE 25 MG: 50 INJECTION, SOLUTION INTRAMUSCULAR; INTRAVENOUS at 16:29

## 2020-04-14 RX ADMIN — METOCLOPRAMIDE HYDROCHLORIDE 5 MG: 5 INJECTION INTRAMUSCULAR; INTRAVENOUS at 16:29

## 2020-04-16 ENCOUNTER — PATIENT OUTREACH (OUTPATIENT)
Dept: CASE MANAGEMENT | Facility: OTHER | Age: 25
End: 2020-04-16

## 2020-04-16 LAB
BACTERIA UR CULT: ABNORMAL
BACTERIA UR CULT: ABNORMAL

## 2020-05-07 ENCOUNTER — HOSPITAL ENCOUNTER (EMERGENCY)
Facility: HOSPITAL | Age: 25
Discharge: HOME | End: 2020-05-07
Attending: EMERGENCY MEDICINE | Admitting: EMERGENCY MEDICINE
Payer: COMMERCIAL

## 2020-05-07 ENCOUNTER — APPOINTMENT (EMERGENCY)
Dept: RADIOLOGY | Facility: HOSPITAL | Age: 25
End: 2020-05-07
Payer: COMMERCIAL

## 2020-05-07 VITALS
OXYGEN SATURATION: 98 % | DIASTOLIC BLOOD PRESSURE: 56 MMHG | TEMPERATURE: 98.1 F | WEIGHT: 141.09 LBS | BODY MASS INDEX: 26.64 KG/M2 | RESPIRATION RATE: 18 BRPM | SYSTOLIC BLOOD PRESSURE: 110 MMHG | HEIGHT: 61 IN | HEART RATE: 96 BPM

## 2020-05-07 DIAGNOSIS — T50.905A MEDICATION REACTION, INITIAL ENCOUNTER: Primary | ICD-10-CM

## 2020-05-07 LAB
ALBUMIN SERPL BCP-MCNC: 3.9 G/DL (ref 3.5–5)
ALP SERPL-CCNC: 44 U/L (ref 46–116)
ALT SERPL W P-5'-P-CCNC: 13 U/L (ref 12–78)
ANION GAP SERPL CALCULATED.3IONS-SCNC: 11 MMOL/L (ref 4–13)
APTT PPP: 25 SECONDS (ref 23–37)
AST SERPL W P-5'-P-CCNC: 11 U/L (ref 5–45)
BASOPHILS # BLD AUTO: 0.03 THOUSANDS/ΜL (ref 0–0.1)
BASOPHILS NFR BLD AUTO: 0 % (ref 0–1)
BILIRUB SERPL-MCNC: 0.4 MG/DL (ref 0.2–1)
BUN SERPL-MCNC: 14 MG/DL (ref 5–25)
CALCIUM SERPL-MCNC: 9.2 MG/DL (ref 8.3–10.1)
CHLORIDE SERPL-SCNC: 102 MMOL/L (ref 100–108)
CO2 SERPL-SCNC: 22 MMOL/L (ref 21–32)
CREAT SERPL-MCNC: 0.94 MG/DL (ref 0.6–1.3)
D DIMER PPP FEU-MCNC: 0.39 UG/ML FEU
EOSINOPHIL # BLD AUTO: 0.19 THOUSAND/ΜL (ref 0–0.61)
EOSINOPHIL NFR BLD AUTO: 2 % (ref 0–6)
ERYTHROCYTE [DISTWIDTH] IN BLOOD BY AUTOMATED COUNT: 12.2 % (ref 11.6–15.1)
GFR SERPL CREATININE-BSD FRML MDRD: 99 ML/MIN/1.73SQ M
GLUCOSE SERPL-MCNC: 144 MG/DL (ref 65–140)
HCT VFR BLD AUTO: 31.1 % (ref 34.8–46.1)
HGB BLD-MCNC: 10.5 G/DL (ref 11.5–15.4)
IMM GRANULOCYTES # BLD AUTO: 0.05 THOUSAND/UL (ref 0–0.2)
IMM GRANULOCYTES NFR BLD AUTO: 0 % (ref 0–2)
INR PPP: 1 (ref 0.84–1.19)
LYMPHOCYTES # BLD AUTO: 3.81 THOUSANDS/ΜL (ref 0.6–4.47)
LYMPHOCYTES NFR BLD AUTO: 30 % (ref 14–44)
MCH RBC QN AUTO: 30.4 PG (ref 26.8–34.3)
MCHC RBC AUTO-ENTMCNC: 33.8 G/DL (ref 31.4–37.4)
MCV RBC AUTO: 90 FL (ref 82–98)
MONOCYTES # BLD AUTO: 0.6 THOUSAND/ΜL (ref 0.17–1.22)
MONOCYTES NFR BLD AUTO: 5 % (ref 4–12)
NEUTROPHILS # BLD AUTO: 8.08 THOUSANDS/ΜL (ref 1.85–7.62)
NEUTS SEG NFR BLD AUTO: 63 % (ref 43–75)
NRBC BLD AUTO-RTO: 0 /100 WBCS
PLATELET # BLD AUTO: 303 THOUSANDS/UL (ref 149–390)
PMV BLD AUTO: 10.9 FL (ref 8.9–12.7)
POTASSIUM SERPL-SCNC: 3.5 MMOL/L (ref 3.5–5.3)
PROT SERPL-MCNC: 7.8 G/DL (ref 6.4–8.2)
PROTHROMBIN TIME: 13.2 SECONDS (ref 11.6–14.5)
RBC # BLD AUTO: 3.45 MILLION/UL (ref 3.81–5.12)
SARS-COV-2 RNA RESP QL NAA+PROBE: NEGATIVE
SODIUM SERPL-SCNC: 135 MMOL/L (ref 136–145)
TROPONIN I SERPL-MCNC: <0.02 NG/ML
WBC # BLD AUTO: 12.76 THOUSAND/UL (ref 4.31–10.16)

## 2020-05-07 PROCEDURE — 85025 COMPLETE CBC W/AUTO DIFF WBC: CPT | Performed by: PHYSICIAN ASSISTANT

## 2020-05-07 PROCEDURE — 80053 COMPREHEN METABOLIC PANEL: CPT | Performed by: PHYSICIAN ASSISTANT

## 2020-05-07 PROCEDURE — 85379 FIBRIN DEGRADATION QUANT: CPT | Performed by: PHYSICIAN ASSISTANT

## 2020-05-07 PROCEDURE — 36415 COLL VENOUS BLD VENIPUNCTURE: CPT | Performed by: PHYSICIAN ASSISTANT

## 2020-05-07 PROCEDURE — 96360 HYDRATION IV INFUSION INIT: CPT

## 2020-05-07 PROCEDURE — 93005 ELECTROCARDIOGRAM TRACING: CPT

## 2020-05-07 PROCEDURE — 85610 PROTHROMBIN TIME: CPT | Performed by: PHYSICIAN ASSISTANT

## 2020-05-07 PROCEDURE — 99285 EMERGENCY DEPT VISIT HI MDM: CPT

## 2020-05-07 PROCEDURE — U0003 INFECTIOUS AGENT DETECTION BY NUCLEIC ACID (DNA OR RNA); SEVERE ACUTE RESPIRATORY SYNDROME CORONAVIRUS 2 (SARS-COV-2) (CORONAVIRUS DISEASE [COVID-19]), AMPLIFIED PROBE TECHNIQUE, MAKING USE OF HIGH THROUGHPUT TECHNOLOGIES AS DESCRIBED BY CMS-2020-01-R: HCPCS | Performed by: PHYSICIAN ASSISTANT

## 2020-05-07 PROCEDURE — 96361 HYDRATE IV INFUSION ADD-ON: CPT

## 2020-05-07 PROCEDURE — 71045 X-RAY EXAM CHEST 1 VIEW: CPT

## 2020-05-07 PROCEDURE — 85730 THROMBOPLASTIN TIME PARTIAL: CPT | Performed by: PHYSICIAN ASSISTANT

## 2020-05-07 PROCEDURE — 84484 ASSAY OF TROPONIN QUANT: CPT | Performed by: PHYSICIAN ASSISTANT

## 2020-05-07 PROCEDURE — 99284 EMERGENCY DEPT VISIT MOD MDM: CPT | Performed by: PHYSICIAN ASSISTANT

## 2020-05-07 RX ADMIN — SODIUM CHLORIDE 1000 ML: 0.9 INJECTION, SOLUTION INTRAVENOUS at 12:59

## 2020-05-08 LAB
ATRIAL RATE: 99 BPM
P AXIS: 53 DEGREES
PR INTERVAL: 144 MS
QRS AXIS: 53 DEGREES
QRSD INTERVAL: 76 MS
QT INTERVAL: 328 MS
QTC INTERVAL: 420 MS
T WAVE AXIS: 7 DEGREES
VENTRICULAR RATE: 99 BPM

## 2020-05-08 PROCEDURE — 93010 ELECTROCARDIOGRAM REPORT: CPT | Performed by: INTERNAL MEDICINE

## 2020-05-28 ENCOUNTER — HOSPITAL ENCOUNTER (EMERGENCY)
Age: 25
Discharge: HOME | End: 2020-05-28
Attending: EMERGENCY MEDICINE
Payer: COMMERCIAL

## 2020-05-28 VITALS
HEIGHT: 62 IN | RESPIRATION RATE: 18 BRPM | BODY MASS INDEX: 21.16 KG/M2 | DIASTOLIC BLOOD PRESSURE: 67 MMHG | WEIGHT: 115 LBS | OXYGEN SATURATION: 100 % | SYSTOLIC BLOOD PRESSURE: 109 MMHG | TEMPERATURE: 98 F | HEART RATE: 67 BPM

## 2020-05-28 PROCEDURE — 99282 EMERGENCY DEPT VISIT SF MDM: CPT

## 2020-05-28 RX ORDER — IBUPROFEN 800 MG/1
800 TABLET ORAL EVERY 8 HOURS PRN
Qty: 20 TABLET | Refills: 0 | Status: SHIPPED | OUTPATIENT
Start: 2020-05-28 | End: 2021-07-04

## 2020-05-28 RX ORDER — ACETAMINOPHEN AND CODEINE PHOSPHATE 300; 30 MG/1; MG/1
1 TABLET ORAL EVERY 6 HOURS PRN
Qty: 20 TABLET | Refills: 0 | Status: SHIPPED | OUTPATIENT
Start: 2020-05-28 | End: 2020-06-02

## 2020-05-28 RX ORDER — PENICILLIN V POTASSIUM 250 MG/1
250 TABLET ORAL 4 TIMES DAILY
Qty: 40 TABLET | Refills: 0 | Status: SHIPPED | OUTPATIENT
Start: 2020-05-28 | End: 2020-06-07

## 2020-05-28 ASSESSMENT — ENCOUNTER SYMPTOMS
COLOR CHANGE: 0
COUGH: 0
EYE REDNESS: 0
VOMITING: 0
FACIAL SWELLING: 0
SHORTNESS OF BREATH: 0
CONSTIPATION: 0
ABDOMINAL PAIN: 0
DIARRHEA: 0
EYE DISCHARGE: 0

## 2020-05-28 ASSESSMENT — PAIN DESCRIPTION - PAIN TYPE: TYPE: ACUTE PAIN

## 2020-05-28 ASSESSMENT — PAIN DESCRIPTION - LOCATION: LOCATION: TEETH

## 2020-05-28 ASSESSMENT — PAIN SCALES - GENERAL: PAINLEVEL_OUTOF10: 8

## 2020-05-28 NOTE — ED PROVIDER NOTES
person, place, and time. Psychiatric:         Behavior: Behavior normal.             DIAGNOSTIC RESULTS     EKG: All EKG's are interpreted by the Emergency Department Physician who either signs or Co-signs this chart in the absence of a cardiologist.    Not indicated    RADIOLOGY:   Non-plain film images such as CT, Ultrasound and MRI are read by the radiologist. Plain radiographic images are visualized and preliminarily interpreted by the emergency physician with the below findings:    Not indicated    Interpretation per the Radiologist below, if available at the time of this note:        LABS:  Labs Reviewed - No data to display    All other labs were within normal range or not returned as of this dictation. EMERGENCY DEPARTMENT COURSE and DIFFERENTIAL DIAGNOSIS/MDM:   Vitals:    Vitals:    05/28/20 0148   BP: 109/67   Pulse: 67   Resp: 18   Temp: 98 °F (36.7 °C)   TempSrc: Oral   SpO2: 100%   Weight: 115 lb (52.2 kg)   Height: 5' 2\" (1.575 m)       Orders Placed This Encounter   Medications    penicillin v potassium (VEETID) 250 MG tablet     Sig: Take 1 tablet by mouth 4 times daily for 10 days     Dispense:  40 tablet     Refill:  0    acetaminophen-codeine (TYLENOL/CODEINE #3) 300-30 MG per tablet     Sig: Take 1 tablet by mouth every 6 hours as needed for Pain for up to 5 days. Dispense:  20 tablet     Refill:  0    ibuprofen (ADVIL;MOTRIN) 800 MG tablet     Sig: Take 1 tablet by mouth every 8 hours as needed for Pain     Dispense:  20 tablet     Refill:  0       Medical Decision Making: She is provided antibiotic and pain medication and she will follow-up with her dentist.  Treatment diagnosis and follow-up were discussed with the patient. CONSULTS:  None    PROCEDURES:  None    FINAL IMPRESSION      1.  Pain due to dental caries          DISPOSITION/PLAN   DISPOSITION Decision To Discharge 05/28/2020 01:55:48 AM      PATIENT REFERRED TO:   Shabbir Delgado DO  [address]

## 2020-06-04 ENCOUNTER — DOCTOR'S OFFICE (OUTPATIENT)
Dept: URBAN - METROPOLITAN AREA CLINIC 137 | Facility: CLINIC | Age: 25
Setting detail: OPHTHALMOLOGY
End: 2020-06-04
Payer: COMMERCIAL

## 2020-06-04 ENCOUNTER — OPTICAL OFFICE (OUTPATIENT)
Dept: URBAN - METROPOLITAN AREA CLINIC 146 | Facility: CLINIC | Age: 25
Setting detail: OPHTHALMOLOGY
End: 2020-06-04
Payer: COMMERCIAL

## 2020-06-04 DIAGNOSIS — H52.13: ICD-10-CM

## 2020-06-04 DIAGNOSIS — H52.223: ICD-10-CM

## 2020-06-04 PROCEDURE — 92310 CONTACT LENS FITTING OU: CPT | Performed by: OPTOMETRIST

## 2020-06-04 PROCEDURE — V2103 SPHEROCYLINDR 4.00D/12-2.00D: HCPCS | Performed by: OPTOMETRIST

## 2020-06-04 PROCEDURE — 92002 INTRM OPH EXAM NEW PATIENT: CPT | Performed by: OPTOMETRIST

## 2020-06-04 PROCEDURE — V2020 VISION SVCS FRAMES PURCHASES: HCPCS | Performed by: OPTOMETRIST

## 2020-06-04 ASSESSMENT — REFRACTION_CURRENTRX
OS_CYLINDER: -0.25
OS_AXIS: 089
OD_OVR_VA: 20/
OD_SPHERE: -3.75
OD_CYLINDER: SPH
OS_OVR_VA: 20/
OS_SPHERE: -3.75

## 2020-06-04 ASSESSMENT — AXIALLENGTH_DERIVED
OD_AL: 24.8017
OS_AL: 24.9005
OS_AL: 25.1747

## 2020-06-04 ASSESSMENT — REFRACTION_AUTOREFRACTION
OS_AXIS: 127
OS_SPHERE: -4.50
OS_CYLINDER: -0.50
OD_SPHERE: -5.25

## 2020-06-04 ASSESSMENT — CONFRONTATIONAL VISUAL FIELD TEST (CVF)
OD_FINDINGS: FULL
OS_FINDINGS: FULL

## 2020-06-04 ASSESSMENT — REFRACTION_MANIFEST
OS_CYLINDER: -0.25
OD_CYLINDER: -0.50
OD_SPHERE: -4.00
OS_VA1: 20/20
OD_VA1: 20/20
OS_AXIS: 180
OD_AXIS: 165
OS_SPHERE: -4.00

## 2020-06-04 ASSESSMENT — KERATOMETRY
OD_K1POWER_DIOPTERS: 44.75
OS_AXISANGLE_DEGREES: 138
OS_K2POWER_DIOPTERS: 44.25
OD_AXISANGLE_DEGREES: 034
OS_K1POWER_DIOPTERS: 44.75
OD_K2POWER_DIOPTERS: 45.00

## 2020-06-04 ASSESSMENT — SPHEQUIV_DERIVED
OS_SPHEQUIV: -4.125
OD_SPHEQUIV: -4.25
OS_SPHEQUIV: -4.75

## 2020-06-04 ASSESSMENT — VISUAL ACUITY
OS_BCVA: 20/25-1
OD_BCVA: 20/20

## 2020-06-08 ENCOUNTER — HOSPITAL ENCOUNTER (OUTPATIENT)
Age: 25
Setting detail: SPECIMEN
Discharge: HOME | End: 2020-06-08
Payer: COMMERCIAL

## 2020-06-08 ENCOUNTER — OFFICE VISIT (OUTPATIENT)
Dept: FAMILY MEDICINE CLINIC | Age: 25
End: 2020-06-08
Payer: COMMERCIAL

## 2020-06-08 VITALS
WEIGHT: 108.4 LBS | HEART RATE: 58 BPM | BODY MASS INDEX: 19.95 KG/M2 | HEIGHT: 62 IN | OXYGEN SATURATION: 100 % | DIASTOLIC BLOOD PRESSURE: 66 MMHG | SYSTOLIC BLOOD PRESSURE: 104 MMHG

## 2020-06-08 LAB
HBV SURFACE AB TITR SER: <3.5 MIU/ML
RUBV IGG SER QL: 8.9 IU/ML

## 2020-06-08 PROCEDURE — 99395 PREV VISIT EST AGE 18-39: CPT | Performed by: FAMILY MEDICINE

## 2020-06-08 ASSESSMENT — PATIENT HEALTH QUESTIONNAIRE - PHQ9
2. FEELING DOWN, DEPRESSED OR HOPELESS: 0
SUM OF ALL RESPONSES TO PHQ QUESTIONS 1-9: 0
1. LITTLE INTEREST OR PLEASURE IN DOING THINGS: 0
SUM OF ALL RESPONSES TO PHQ9 QUESTIONS 1 & 2: 0
SUM OF ALL RESPONSES TO PHQ QUESTIONS 1-9: 0

## 2020-06-08 NOTE — PROGRESS NOTES
[facility]  [facility] [address] [facility] [address]  Dept: [phone]      Hardik Vilchis is a 22 y.o. female who presents today for follow up on her  medical conditions as noted below. 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       Patient Active Problem List:     Hereditary disease in family possibly affecting fetus, affecting management of mother, antepartum condition or complication     Other current maternal conditions classifiable elsewhere, antepartum     Feeling pelvic pressure in pregnancy, antepartum     Ovarian cyst complicating pregnancy, antepartum     Pyelonephritis     Renal cyst, right     Traumatic pneumothorax     Fall down stairs     Hypotension     Past Medical History:   Diagnosis Date    Allergic     codeine- anaphylaxis    allergy     codeine- anaphylaxis    Anemia 11/3/2019    Pyelonephritis 3/14/2015    Pyelonephritis       Past Surgical History:   Procedure Laterality Date     SECTION      TYMPANOSTOMY TUBE PLACEMENT       Family History   Problem Relation Age of Onset    COPD Mother     Other Mother         Tube insertion x2    No Known Problems Father     No Known Problems Brother        Current Outpatient Medications   Medication Sig Dispense Refill    ibuprofen (ADVIL;MOTRIN) 800 MG tablet Take 1 tablet by mouth every 8 hours as needed for Pain (Patient not taking: Reported on 2020) 20 tablet 0     No current facility-administered medications for this visit.       ALLERGIES:  No Known Allergies    Social History     Tobacco Use    Smoking status: Never Smoker    Smokeless tobacco: Never Used   Substance Use Topics    Alcohol use: No        LDL Cholesterol (mg/dL)   Date Value   2013 79     HDL (mg/dL)   Date Value   2013 49     BUN (mg/dL)   Date Value   2019 9     CREATININE (mg/dL)   Date Value   2019 0.65     Glucose

## 2020-06-10 ENCOUNTER — TELEPHONE (OUTPATIENT)
Dept: FAMILY MEDICINE CLINIC | Age: 25
End: 2020-06-10

## 2020-06-10 LAB
MEASLES IMMUNE (IGG): 1.55
MUV IGG SER QL: 2.34
QUANTI TB GOLD PLUS: NEGATIVE
QUANTI TB1 MINUS NIL: 0 IU/ML (ref 0–0.34)
QUANTI TB2 MINUS NIL: 0 IU/ML (ref 0–0.34)
QUANTIFERON MITOGEN: 8.6 IU/ML
QUANTIFERON NIL: 0.02 IU/ML
VZV IGG SER QL IA: 3.31

## 2020-06-11 ENCOUNTER — TELEPHONE (OUTPATIENT)
Dept: FAMILY MEDICINE CLINIC | Age: 25
End: 2020-06-11

## 2020-08-10 ENCOUNTER — OPTICAL OFFICE (OUTPATIENT)
Dept: URBAN - METROPOLITAN AREA CLINIC 146 | Facility: CLINIC | Age: 25
Setting detail: OPHTHALMOLOGY
End: 2020-08-10

## 2020-08-10 DIAGNOSIS — H52.13: ICD-10-CM

## 2020-08-10 PROCEDURE — S0500 DISPOS CONT LENS: HCPCS | Performed by: OPTOMETRIST

## 2020-09-07 ENCOUNTER — HOSPITAL ENCOUNTER (EMERGENCY)
Age: 25
Discharge: HOME | End: 2020-09-07
Attending: EMERGENCY MEDICINE
Payer: COMMERCIAL

## 2020-09-07 VITALS
HEART RATE: 62 BPM | TEMPERATURE: 98.1 F | BODY MASS INDEX: 20.24 KG/M2 | HEIGHT: 62 IN | DIASTOLIC BLOOD PRESSURE: 75 MMHG | OXYGEN SATURATION: 99 % | WEIGHT: 110 LBS | RESPIRATION RATE: 16 BRPM | SYSTOLIC BLOOD PRESSURE: 113 MMHG

## 2020-09-07 LAB
-: NORMAL
AMORPHOUS: NORMAL
BACTERIA: NORMAL
BILIRUBIN URINE: NEGATIVE
CASTS UA: NORMAL /LPF
CHP ED QC CHECK: NORMAL
COLOR: YELLOW
COMMENT UA: ABNORMAL
CRYSTALS, UA: NORMAL /HPF
EPITHELIAL CELLS UA: NORMAL /HPF (ref 0–5)
GLUCOSE URINE: NEGATIVE
KETONES, URINE: NEGATIVE
LEUKOCYTE ESTERASE, URINE: ABNORMAL
MUCUS: NORMAL
NITRITE, URINE: NEGATIVE
OTHER OBSERVATIONS UA: NORMAL
PH UA: 6.5 (ref 5–8)
PREGNANCY TEST URINE, POC: NORMAL
PROTEIN UA: NEGATIVE
RBC UA: NORMAL /HPF (ref 0–2)
RENAL EPITHELIAL, UA: NORMAL /HPF
SPECIFIC GRAVITY UA: 1.02 (ref 1–1.03)
TRICHOMONAS: NORMAL
TURBIDITY: ABNORMAL
URINE HGB: NEGATIVE
UROBILINOGEN, URINE: NORMAL
WBC UA: NORMAL /HPF (ref 0–5)
YEAST: NORMAL

## 2020-09-07 PROCEDURE — 87086 URINE CULTURE/COLONY COUNT: CPT

## 2020-09-07 PROCEDURE — 2500000003 HC RX 250 WO HCPCS: Performed by: EMERGENCY MEDICINE

## 2020-09-07 PROCEDURE — 81025 URINE PREGNANCY TEST: CPT

## 2020-09-07 PROCEDURE — 96372 THER/PROPH/DIAG INJ SC/IM: CPT

## 2020-09-07 PROCEDURE — 6360000002 HC RX W HCPCS: Performed by: EMERGENCY MEDICINE

## 2020-09-07 PROCEDURE — 86403 PARTICLE AGGLUT ANTBDY SCRN: CPT

## 2020-09-07 PROCEDURE — 81001 URINALYSIS AUTO W/SCOPE: CPT

## 2020-09-07 PROCEDURE — 99283 EMERGENCY DEPT VISIT LOW MDM: CPT

## 2020-09-07 RX ORDER — CEPHALEXIN 500 MG/1
500 CAPSULE ORAL 3 TIMES DAILY
Qty: 21 CAPSULE | Refills: 0 | Status: SHIPPED | OUTPATIENT
Start: 2020-09-07 | End: 2020-09-14

## 2020-09-07 RX ORDER — IBUPROFEN 600 MG/1
600 TABLET ORAL EVERY 6 HOURS PRN
Qty: 20 TABLET | Refills: 0 | Status: SHIPPED | OUTPATIENT
Start: 2020-09-07 | End: 2021-07-04

## 2020-09-07 RX ORDER — LIDOCAINE HYDROCHLORIDE 10 MG/ML
5 INJECTION, SOLUTION INFILTRATION; PERINEURAL ONCE
Status: COMPLETED | OUTPATIENT
Start: 2020-09-07 | End: 2020-09-07

## 2020-09-07 RX ORDER — CEFTRIAXONE 1 G/1
1 INJECTION, POWDER, FOR SOLUTION INTRAMUSCULAR; INTRAVENOUS ONCE
Status: COMPLETED | OUTPATIENT
Start: 2020-09-07 | End: 2020-09-07

## 2020-09-07 RX ORDER — PHENAZOPYRIDINE HYDROCHLORIDE 200 MG/1
200 TABLET, FILM COATED ORAL 3 TIMES DAILY PRN
Qty: 6 TABLET | Refills: 0 | Status: SHIPPED | OUTPATIENT
Start: 2020-09-07 | End: 2020-09-10

## 2020-09-07 RX ADMIN — LIDOCAINE HYDROCHLORIDE 5 ML: 10 INJECTION, SOLUTION INFILTRATION; PERINEURAL at 01:50

## 2020-09-07 RX ADMIN — CEFTRIAXONE SODIUM 1 G: 1 INJECTION, POWDER, FOR SOLUTION INTRAMUSCULAR; INTRAVENOUS at 01:50

## 2020-09-07 ASSESSMENT — PAIN SCALES - GENERAL: PAINLEVEL_OUTOF10: 10

## 2020-09-07 NOTE — ED PROVIDER NOTES
[address] [place] [address] ED  eMERGENCY dEPARTMENT eNCOUnter      Pt Name: Wojciech Carl  MRN: [id number]  [place] 1995  Date of evaluation: 2020  Provider: Edil Nayak MD    CHIEF COMPLAINT       Chief Complaint   Patient presents with    Urinary Tract Infection         HISTORY OF PRESENT ILLNESS  (Location/Symptom, Timing/Onset, Context/Setting, Quality, Duration, Modifying Factors, Severity.)   Wojciech Carl is a 22 y.o. female who presents to the emergency department for evaluation of UTI symptoms. Patient states she has a history of recurrent UTI. She states she developed her classic symptoms about 3 days ago. Initially she thought discomfort was due solely to her menses however her menses has ended. She continues to have dysuria urgency frequency. She does have previous history of Pyelo. She denies any flank pain nausea vomiting or fever      Nursing Notes were reviewed. ALLERGIES     Patient has no known allergies. CURRENT MEDICATIONS       Previous Medications    IBUPROFEN (ADVIL;MOTRIN) 800 MG TABLET    Take 1 tablet by mouth every 8 hours as needed for Pain       PAST MEDICAL HISTORY         Diagnosis Date    Allergic     codeine- anaphylaxis    allergy     codeine- anaphylaxis    Anemia 11/3/2019    Pyelonephritis 3/14/2015    Pyelonephritis        SURGICAL HISTORY           Procedure Laterality Date     SECTION      TYMPANOSTOMY TUBE PLACEMENT           FAMILY HISTORY           Problem Relation Age of Onset    COPD Mother     Other Mother         Tube insertion x2    No Known Problems Father     No Known Problems Brother      Family Status   Relation Name Status    Mother  Alive    Father  Alive    Brother  Alive        SOCIAL HISTORY      reports that she has never smoked. She has never used smokeless tobacco. She reports that she does not drink alcohol or use drugs.     REVIEW OF SYSTEMS    (2-9 systems for level 4, 10 or more for level 5)     Review of Systems   All other systems reviewed and are negative. Except as noted above the remainder of the review of systems was reviewed and negative. PHYSICAL EXAM    (up to 7 for level 4, 8 or more for level 5)     Vitals:    09/07/20 0131   BP: 113/75   Pulse: 62   Resp: 16   Temp: 98.1 °F (36.7 °C)   TempSrc: Oral   SpO2: 99%   Weight: 110 lb (49.9 kg)   Height: 5' 2\" (1.575 m)       Physical exam reflects a pleasant well-nourished well-hydrated female. She is afebrile with stable vital signs to include pulse ox of 99% on room air. She is not hypoxic. She is alert conversive and appropriate behavior. Integument warm and dry she does not appear to be in severe distress. Heart regular rate and rhythm lungs are clear to auscultation abdomen soft no flank pain. She declines formal pelvic. Visualized integument without rash or lesion      DIAGNOSTIC RESULTS         RADIOLOGY:   Non-plain film images such as CT, Ultrasound and MRI are read by the radiologist. Plain radiographic images are visualized and preliminarily interpreted by the emergency physician with the below findings:    Interpretation per the Radiologist below, if available at the time of this note:    No orders to display           LABS:  Labs Reviewed   POCT URINE PREGNANCY - Normal   CULTURE, URINE   URINALYSIS       All other labs were within normal range or not returned as of this dictation. EMERGENCY DEPARTMENT COURSE and DIFFERENTIAL DIAGNOSIS/MDM:   Vitals:    Vitals:    09/07/20 0131   BP: 113/75   Pulse: 62   Resp: 16   Temp: 98.1 °F (36.7 °C)   TempSrc: Oral   SpO2: 99%   Weight: 110 lb (49.9 kg)   Height: 5' 2\" (1.575 m)     Patient is evaluated. Pregnancy test confirmed negative. She presents with symptoms of her classic/recurrent UTI. She is treated here with Rocephin. She will be discharged home on continued antibiotic coverage and symptomatic management.   She is advised follow-up if not improving. CONSULTS:  None    PROCEDURES:  None    FINAL IMPRESSION      1. Acute cystitis without hematuria    2. Recurrent UTI          DISPOSITION/PLAN   DISPOSITION Decision To Discharge 09/07/2020 01:43:56 AM      PATIENT REFERRED TO:   Nae Tinajero DO  [address] [place] [id number]  [phone]    In 3 days  If symptoms worsen    [facility] ED  [address] [facility]  [phone]    As needed, If symptoms worsen      DISCHARGE MEDICATIONS:     New Prescriptions    CEPHALEXIN (KEFLEX) 500 MG CAPSULE    Take 1 capsule by mouth 3 times daily for 7 days    IBUPROFEN (ADVIL;MOTRIN) 600 MG TABLET    Take 1 tablet by mouth every 6 hours as needed for Pain    PHENAZOPYRIDINE (PYRIDIUM) 200 MG TABLET    Take 1 tablet by mouth 3 times daily as needed for Pain (bladder spasm/pain)       Controlled Substance Monitoring:    Acute and Chronic Pain Monitoring:   RX Monitoring 9/7/2020   Periodic Controlled Substance Monitoring No signs of potential drug abuse or diversion identified.          (Please note that portions of this note were completed with a voice recognition program.  Efforts were made to edit the dictations but occasionally words are mis-transcribed.)    Livia Torre MD  Attending Emergency Physician          Livia Torre MD  09/07/20 6119

## 2020-09-08 LAB
CULTURE: ABNORMAL
CULTURE: ABNORMAL
HCG, PREGNANCY URINE (POC): NEGATIVE
Lab: ABNORMAL
SPECIMEN DESCRIPTION: ABNORMAL

## 2020-11-08 ENCOUNTER — HOSPITAL ENCOUNTER (EMERGENCY)
Age: 25
Discharge: HOME | End: 2020-11-08
Attending: EMERGENCY MEDICINE
Payer: COMMERCIAL

## 2020-11-08 VITALS
WEIGHT: 105.8 LBS | RESPIRATION RATE: 14 BRPM | SYSTOLIC BLOOD PRESSURE: 119 MMHG | DIASTOLIC BLOOD PRESSURE: 73 MMHG | TEMPERATURE: 98.6 F | BODY MASS INDEX: 19.47 KG/M2 | OXYGEN SATURATION: 99 % | HEIGHT: 62 IN | HEART RATE: 87 BPM

## 2020-11-08 LAB
-: ABNORMAL
AMORPHOUS: ABNORMAL
BACTERIA: ABNORMAL
BILIRUBIN URINE: ABNORMAL
CASTS UA: ABNORMAL /LPF
CHP ED QC CHECK: YES
CHP ED QC CHECK: YES
COLOR: YELLOW
COMMENT UA: ABNORMAL
CRYSTALS, UA: ABNORMAL /HPF
DIRECT EXAM: ABNORMAL
EPITHELIAL CELLS UA: ABNORMAL /HPF (ref 0–5)
GLUCOSE BLD-MCNC: 110 MG/DL (ref 65–105)
GLUCOSE URINE: NEGATIVE
KETONES, URINE: NEGATIVE
LEUKOCYTE ESTERASE, URINE: ABNORMAL
Lab: ABNORMAL
MUCUS: ABNORMAL
NITRITE, URINE: POSITIVE
OTHER OBSERVATIONS UA: ABNORMAL
PH UA: 6 (ref 5–8)
PREGNANCY TEST URINE, POC: NORMAL
PROTEIN UA: NEGATIVE
RBC UA: ABNORMAL /HPF (ref 0–2)
RENAL EPITHELIAL, UA: ABNORMAL /HPF
SPECIFIC GRAVITY UA: 1.04 (ref 1–1.03)
SPECIMEN DESCRIPTION: ABNORMAL
TRICHOMONAS: ABNORMAL
TURBIDITY: ABNORMAL
URINE HGB: NEGATIVE
UROBILINOGEN, URINE: NORMAL
WBC UA: ABNORMAL /HPF (ref 0–5)
YEAST: ABNORMAL

## 2020-11-08 PROCEDURE — 87510 GARDNER VAG DNA DIR PROBE: CPT

## 2020-11-08 PROCEDURE — 87077 CULTURE AEROBIC IDENTIFY: CPT

## 2020-11-08 PROCEDURE — 87480 CANDIDA DNA DIR PROBE: CPT

## 2020-11-08 PROCEDURE — 87591 N.GONORRHOEAE DNA AMP PROB: CPT

## 2020-11-08 PROCEDURE — 82947 ASSAY GLUCOSE BLOOD QUANT: CPT

## 2020-11-08 PROCEDURE — 87186 SC STD MICRODIL/AGAR DIL: CPT

## 2020-11-08 PROCEDURE — 87660 TRICHOMONAS VAGIN DIR PROBE: CPT

## 2020-11-08 PROCEDURE — 81001 URINALYSIS AUTO W/SCOPE: CPT

## 2020-11-08 PROCEDURE — 6360000002 HC RX W HCPCS: Performed by: NURSE PRACTITIONER

## 2020-11-08 PROCEDURE — 6370000000 HC RX 637 (ALT 250 FOR IP): Performed by: NURSE PRACTITIONER

## 2020-11-08 PROCEDURE — 87491 CHLMYD TRACH DNA AMP PROBE: CPT

## 2020-11-08 PROCEDURE — 96372 THER/PROPH/DIAG INJ SC/IM: CPT

## 2020-11-08 PROCEDURE — 87086 URINE CULTURE/COLONY COUNT: CPT

## 2020-11-08 PROCEDURE — 99283 EMERGENCY DEPT VISIT LOW MDM: CPT

## 2020-11-08 RX ORDER — METRONIDAZOLE 500 MG/1
500 TABLET ORAL 2 TIMES DAILY
Qty: 14 TABLET | Refills: 0 | Status: SHIPPED | OUTPATIENT
Start: 2020-11-08 | End: 2020-11-15

## 2020-11-08 RX ORDER — CEPHALEXIN 500 MG/1
500 CAPSULE ORAL 2 TIMES DAILY
Qty: 14 CAPSULE | Refills: 0 | Status: SHIPPED | OUTPATIENT
Start: 2020-11-08 | End: 2021-07-04

## 2020-11-08 RX ORDER — DIPHENHYDRAMINE HCL 25 MG
25 TABLET ORAL ONCE
Status: COMPLETED | OUTPATIENT
Start: 2020-11-08 | End: 2020-11-08

## 2020-11-08 RX ORDER — AZITHROMYCIN 250 MG/1
1000 TABLET, FILM COATED ORAL ONCE
Status: COMPLETED | OUTPATIENT
Start: 2020-11-08 | End: 2020-11-08

## 2020-11-08 RX ORDER — CEFTRIAXONE SODIUM 250 MG/1
250 INJECTION, POWDER, FOR SOLUTION INTRAMUSCULAR; INTRAVENOUS ONCE
Status: COMPLETED | OUTPATIENT
Start: 2020-11-08 | End: 2020-11-08

## 2020-11-08 RX ADMIN — DIPHENHYDRAMINE HCL 25 MG: 25 TABLET ORAL at 15:13

## 2020-11-08 RX ADMIN — CEFTRIAXONE SODIUM 250 MG: 250 INJECTION, POWDER, FOR SOLUTION INTRAMUSCULAR; INTRAVENOUS at 15:04

## 2020-11-08 RX ADMIN — AZITHROMYCIN MONOHYDRATE 1000 MG: 250 TABLET ORAL at 15:04

## 2020-11-08 ASSESSMENT — PAIN SCALES - GENERAL: PAINLEVEL_OUTOF10: 6

## 2020-11-08 NOTE — ED NOTES
Pt calls out to RN and states that she feels like she is going to pass out. RN asks patient if she did pass out pt states she got dizzy. Pt states \"I did not eat today because I haven't been hungry. \"  POC glucose - 110  Pt provided box lunch, and orange juice. Pt states she feels better and would like to leave. ARNULFO Mark notified. PO Benadryl given. Pt ambulates out of department with a steady gait.        Chelle Keene RN  11/08/20 0471

## 2020-11-08 NOTE — ED PROVIDER NOTES
EMERGENCY DEPARTMENT ENCOUNTER   ATTENDING ATTESTATION     Pt Name: Ashwini Ba  MRN: [id number]  [place] 1995  Date of evaluation: 11/8/20   Ashwini Ba is a 22 y.o. female with CC: Exposure to STD and Urinary Tract Infection    MDM:   The patient is a 27-year-old [facility]. Positive for bacteria vaginosis, treated, discharged home with outpatient follow-up and given parameters to return to the emergency department. CRITICAL CARE:       EKG: All EKG's are interpreted by the Emergency Department Physician who either signs or Co-signs this chart in the absence of a cardiologist.      RADIOLOGY:All plain film, CT, MRI, and formal ultrasound images (except ED bedside ultrasound) are read by the radiologist, see reports below, unless otherwise noted in MDM or here. No orders to display     LABS: All lab results were reviewed by myself, and all abnormals are listed below. Labs Reviewed   VAGINITIS DNA PROBE - Abnormal; Notable for the following components:       Result Value    Direct Exam POSITIVE for Gardnerella vaginalis. (*)     All other components within normal limits   URINE RT REFLEX TO CULTURE - Abnormal; Notable for the following components:    Turbidity UA CLOUDY (*)     Bilirubin Urine   (*)     Value: Presumptive positive. Unable to confirm due to unavailability of reagent. Specific Gravity, UA 1.040 (*)     Nitrite, Urine POSITIVE (*)     Leukocyte Esterase, Urine TRACE (*)     All other components within normal limits   MICROSCOPIC URINALYSIS - Abnormal; Notable for the following components:    Bacteria, UA MANY (*)     Mucus, UA 3+ (*)     All other components within normal limits   POCT URINE PREGNANCY - Normal   POCT URINALYSIS DIPSTICK - Normal   C.TRACHOMATIS N.GONORRHOEAE DNA   CULTURE, URINE     CONSULTS:  None  FINAL IMPRESSION    No diagnosis found.         PASTMEDICAL HISTORY     Past Medical History:   Diagnosis Date    Allergic     codeine- anaphylaxis    allergy     codeine- anaphylaxis    Anemia 11/3/2019    Pyelonephritis 3/14/2015    Pyelonephritis      SURGICAL HISTORY       Past Surgical History:   Procedure Laterality Date     SECTION      TYMPANOSTOMY TUBE PLACEMENT       CURRENT MEDICATIONS       Previous Medications    IBUPROFEN (ADVIL;MOTRIN) 600 MG TABLET    Take 1 tablet by mouth every 6 hours as needed for Pain    IBUPROFEN (ADVIL;MOTRIN) 800 MG TABLET    Take 1 tablet by mouth every 8 hours as needed for Pain     ALLERGIES     has No Known Allergies. FAMILY HISTORY     She indicated that her mother is alive. She indicated that her father is alive. She indicated that her brother is alive. SOCIAL HISTORY       Social History     Tobacco Use    Smoking status: Never Smoker    Smokeless tobacco: Never Used   Substance Use Topics    Alcohol use: No    Drug use: No       I personally evaluated and examined the patient in conjunction with the APC and agree with the assessment, treatment plan, and disposition of the patient as recorded by the APC.    Rosette Roman MD  Attending Emergency Physician          Rosette Roman MD  47/88/21 [id number]

## 2020-11-09 LAB
CULTURE: ABNORMAL
Lab: ABNORMAL
SPECIMEN DESCRIPTION: ABNORMAL

## 2020-11-09 ASSESSMENT — ENCOUNTER SYMPTOMS
SINUS PRESSURE: 0
CONSTIPATION: 0
SORE THROAT: 0
COLOR CHANGE: 0
RHINORRHEA: 0
ABDOMINAL PAIN: 0
WHEEZING: 0
VOMITING: 0
COUGH: 0
NAUSEA: 0
DIARRHEA: 0
SHORTNESS OF BREATH: 0

## 2020-11-09 NOTE — ED PROVIDER NOTES
Status   Relation Name Status    Mother  Alive    Father  Alive    Brother  Alive        SOCIAL HISTORY      reports that she has never smoked. She has never used smokeless tobacco. She reports that she does not drink alcohol or use drugs. REVIEW OF SYSTEMS    (2-9 systems for level 4, 10 or more for level 5)     Review of Systems   Constitutional: Negative for chills, fever and unexpected weight change. HENT: Negative for congestion, rhinorrhea, sinus pressure and sore throat. Respiratory: Negative for cough, shortness of breath and wheezing. Cardiovascular: Negative for chest pain and palpitations. Gastrointestinal: Negative for abdominal pain, constipation, diarrhea, nausea and vomiting. Genitourinary: Positive for vaginal discharge. Negative for dysuria and hematuria. Musculoskeletal: Negative for arthralgias and myalgias. Skin: Negative for color change and rash. Neurological: Negative for dizziness, weakness and headaches. Hematological: Negative for adenopathy. All other systems reviewed and are negative. Except as noted above the remainder of the review of systems was reviewed and negative. PHYSICAL EXAM    (up to 7 for level 4, 8 or more for level 5)     ED Triage Vitals   BP Temp Temp src Pulse Resp SpO2 Height Weight   11/08/20 1238 11/08/20 1238 -- 11/08/20 1238 11/08/20 1238 11/08/20 1238 11/08/20 1235 11/08/20 1235   119/73 98.6 °F (37 °C)  87 14 99 % 5' 2\" (1.575 m) 105 lb 12.8 oz (48 kg)       Physical Exam  Vitals signs reviewed. Constitutional:       Appearance: She is well-developed. HENT:      Head: Normocephalic and atraumatic. Eyes:      Conjunctiva/sclera: Conjunctivae normal.      Pupils: Pupils are equal, round, and reactive to light. Neck:      Musculoskeletal: Normal range of motion and neck supple. Cardiovascular:      Rate and Rhythm: Normal rate and regular rhythm.    Pulmonary:      Effort: Pulmonary effort is normal. No respiratory distress. Breath sounds: Normal breath sounds. No stridor. Abdominal:      General: Bowel sounds are normal.      Palpations: Abdomen is soft. Genitourinary:     Vagina: Vaginal discharge present. Musculoskeletal: Normal range of motion. Lymphadenopathy:      Cervical: No cervical adenopathy. Skin:     General: Skin is warm and dry. Findings: No rash. Neurological:      Mental Status: She is alert and oriented to person, place, and time. LABS:  Labs Reviewed   VAGINITIS DNA PROBE - Abnormal; Notable for the following components:       Result Value    Direct Exam POSITIVE for Gardnerella vaginalis. (*)     All other components within normal limits   CULTURE, URINE - Abnormal; Notable for the following components:    Culture   (*)     Value: NON LACTOSE FERMENTING GRAM NEGATIVE RODS >898295 CFU/ML    All other components within normal limits   URINE RT REFLEX TO CULTURE - Abnormal; Notable for the following components:    Turbidity UA CLOUDY (*)     Bilirubin Urine   (*)     Value: Presumptive positive. Unable to confirm due to unavailability of reagent. Specific Gravity, UA 1.040 (*)     Nitrite, Urine POSITIVE (*)     Leukocyte Esterase, Urine TRACE (*)     All other components within normal limits   MICROSCOPIC URINALYSIS - Abnormal; Notable for the following components:    Bacteria, UA MANY (*)     Mucus, UA 3+ (*)     All other components within normal limits   POC GLUCOSE FINGERSTICK - Abnormal; Notable for the following components:    POC Glucose 110 (*)     All other components within normal limits   POCT URINE PREGNANCY - Normal   POCT URINALYSIS DIPSTICK - Normal   C.TRACHOMATIS N.GONORRHOEAE DNA       All other labs were within normal range or not returned as of this dictation.     EMERGENCY DEPARTMENT COURSE and DIFFERENTIAL DIAGNOSIS/MDM:   Vitals:    Vitals:    11/08/20 1235 11/08/20 1238   BP:  119/73   Pulse:  87   Resp:  14   Temp:  98.6 °F (37 °C)   SpO2:  99% Weight: 105 lb 12.8 oz (48 kg)    Height: 5' 2\" (1.575 m)        Medical Decision Making: She was found to have a urinary tract infection and bacterial vaginosis. She will be placed on Flagyl and Keflex. Follow-up with primary care physician for recheck reevaluation. Return to emergency room any worsening symptoms or concerns  Medications   cefTRIAXone (ROCEPHIN) injection 250 mg (250 mg Intramuscular Given 11/8/20 1504)   azithromycin (ZITHROMAX) tablet 1,000 mg (1,000 mg Oral Given 11/8/20 1504)   diphenhydrAMINE (BENADRYL) tablet 25 mg (25 mg Oral Given 11/8/20 1513)       FINAL IMPRESSION      1. Bacterial vaginosis    2.  Urinary tract infection without hematuria, site unspecified          DISPOSITION/PLAN   DISPOSITION Decision To Discharge 11/08/2020 02:54:36 PM      PATIENT REFERRED TO:   Jeffeyr Rodgers DO  [address] [place] [address] 22.  [phone]    Schedule an appointment as soon as possible for a visit       [facility] [place] [facility]  [address] [facility]  [phone]    If symptoms worsen      DISCHARGE MEDICATIONS:     Discharge Medication List as of 11/8/2020  2:55 PM      START taking these medications    Details   cephALEXin (KEFLEX) 500 MG capsule Take 1 capsule by mouth 2 times daily, Disp-14 capsule,R-0Print      metroNIDAZOLE (FLAGYL) 500 MG tablet Take 1 tablet by mouth 2 times daily for 7 days, Disp-14 tablet,R-0Print                 (Please note that portions of this note were completed with a voice recognition program.  Efforts were made to edit the dictations but occasionally words are mis-transcribed.)    Kam Lombardo NP, APRN - [facility]  Certified Nurse Practitioner            JARAD Campo - CNP  11/09/20 9599

## 2020-11-10 LAB
C TRACH DNA GENITAL QL NAA+PROBE: NEGATIVE
N. GONORRHOEAE DNA: NEGATIVE
SPECIMEN DESCRIPTION: NORMAL

## 2020-11-16 ENCOUNTER — HOSPITAL ENCOUNTER (EMERGENCY)
Age: 25
Discharge: HOME | End: 2020-11-16
Attending: EMERGENCY MEDICINE
Payer: COMMERCIAL

## 2020-11-16 VITALS
BODY MASS INDEX: 20.24 KG/M2 | RESPIRATION RATE: 18 BRPM | HEART RATE: 79 BPM | DIASTOLIC BLOOD PRESSURE: 81 MMHG | SYSTOLIC BLOOD PRESSURE: 118 MMHG | OXYGEN SATURATION: 100 % | HEIGHT: 62 IN | TEMPERATURE: 98.6 F | WEIGHT: 110 LBS

## 2020-11-16 LAB
-: ABNORMAL
AMORPHOUS: ABNORMAL
BACTERIA: ABNORMAL
BILIRUBIN URINE: NEGATIVE
CASTS UA: ABNORMAL /LPF
CHP ED QC CHECK: YES
COLOR: YELLOW
COMMENT UA: ABNORMAL
CRYSTALS, UA: ABNORMAL /HPF
DIRECT EXAM: ABNORMAL
EPITHELIAL CELLS UA: ABNORMAL /HPF (ref 0–5)
GLUCOSE URINE: NEGATIVE
KETONES, URINE: NEGATIVE
LEUKOCYTE ESTERASE, URINE: ABNORMAL
Lab: ABNORMAL
MUCUS: ABNORMAL
NITRITE, URINE: NEGATIVE
OTHER OBSERVATIONS UA: ABNORMAL
PH UA: 7 (ref 5–8)
PREGNANCY TEST URINE, POC: NEGATIVE
PROTEIN UA: NEGATIVE
RBC UA: ABNORMAL /HPF (ref 0–2)
RENAL EPITHELIAL, UA: ABNORMAL /HPF
SPECIFIC GRAVITY UA: 1.02 (ref 1–1.03)
SPECIMEN DESCRIPTION: ABNORMAL
TRICHOMONAS: ABNORMAL
TURBIDITY: ABNORMAL
URINE HGB: NEGATIVE
UROBILINOGEN, URINE: NORMAL
WBC UA: ABNORMAL /HPF (ref 0–5)
YEAST: ABNORMAL

## 2020-11-16 PROCEDURE — 6360000002 HC RX W HCPCS: Performed by: NURSE PRACTITIONER

## 2020-11-16 PROCEDURE — 87510 GARDNER VAG DNA DIR PROBE: CPT

## 2020-11-16 PROCEDURE — 96372 THER/PROPH/DIAG INJ SC/IM: CPT

## 2020-11-16 PROCEDURE — 87660 TRICHOMONAS VAGIN DIR PROBE: CPT

## 2020-11-16 PROCEDURE — 99284 EMERGENCY DEPT VISIT MOD MDM: CPT

## 2020-11-16 PROCEDURE — 81001 URINALYSIS AUTO W/SCOPE: CPT

## 2020-11-16 PROCEDURE — 87491 CHLMYD TRACH DNA AMP PROBE: CPT

## 2020-11-16 PROCEDURE — 81025 URINE PREGNANCY TEST: CPT

## 2020-11-16 PROCEDURE — 6370000000 HC RX 637 (ALT 250 FOR IP): Performed by: NURSE PRACTITIONER

## 2020-11-16 PROCEDURE — 87480 CANDIDA DNA DIR PROBE: CPT

## 2020-11-16 PROCEDURE — 87591 N.GONORRHOEAE DNA AMP PROB: CPT

## 2020-11-16 RX ORDER — CEFTRIAXONE SODIUM 250 MG/1
250 INJECTION, POWDER, FOR SOLUTION INTRAMUSCULAR; INTRAVENOUS ONCE
Status: COMPLETED | OUTPATIENT
Start: 2020-11-16 | End: 2020-11-16

## 2020-11-16 RX ORDER — AZITHROMYCIN 250 MG/1
1000 TABLET, FILM COATED ORAL ONCE
Status: COMPLETED | OUTPATIENT
Start: 2020-11-16 | End: 2020-11-16

## 2020-11-16 RX ORDER — METRONIDAZOLE 500 MG/1
500 TABLET ORAL 2 TIMES DAILY
Qty: 14 TABLET | Refills: 0 | Status: SHIPPED | OUTPATIENT
Start: 2020-11-16 | End: 2020-11-23

## 2020-11-16 RX ADMIN — CEFTRIAXONE SODIUM 250 MG: 250 INJECTION, POWDER, FOR SOLUTION INTRAMUSCULAR; INTRAVENOUS at 15:26

## 2020-11-16 RX ADMIN — AZITHROMYCIN MONOHYDRATE 1000 MG: 250 TABLET ORAL at 15:26

## 2020-11-16 ASSESSMENT — ENCOUNTER SYMPTOMS
DIARRHEA: 0
NAUSEA: 0
COLOR CHANGE: 0
ABDOMINAL PAIN: 0
VOMITING: 0

## 2020-11-16 NOTE — ED PROVIDER NOTES
[facility] (formerly [facility] ED  eMERGENCY dEPARTMENT eNCOUnter      Pt Name: Saleem Jenkins  MRN: [id number]  [place] 1995  Date of evaluation: 2020  Provider: JARAD Zambrano CNP    CHIEF COMPLAINT       Chief Complaint   Patient presents with    Exposure to STD         HISTORY OF PRESENT ILLNESS  (Location/Symptom, Timing/Onset, Context/Setting, Quality, Duration, Modifying Factors, Severity.)   Saleem Jenkins is a 26 y.o. female who presents to the emergency department via private auto for vaginal discharge, STD concern. States a former partner told her today that they tested positive for gonorrhea. Denies fever, chills, abdominal pain, vaginal lesions. Reports mild dysuria for a few days. Rates her pain 0/10 at this time. Nursing Notes were reviewed. ALLERGIES     Patient has no known allergies. CURRENT MEDICATIONS       Discharge Medication List as of 2020  3:21 PM      CONTINUE these medications which have NOT CHANGED    Details   cephALEXin (KEFLEX) 500 MG capsule Take 1 capsule by mouth 2 times daily, Disp-14 capsule,R-0Print      !! ibuprofen (ADVIL;MOTRIN) 600 MG tablet Take 1 tablet by mouth every 6 hours as needed for Pain, Disp-20 tablet,R-0Print      !! ibuprofen (ADVIL;MOTRIN) 800 MG tablet Take 1 tablet by mouth every 8 hours as needed for Pain, Disp-20 tablet, R-0Print       !! - Potential duplicate medications found. Please discuss with provider.           PAST MEDICAL HISTORY         Diagnosis Date    Allergic     codeine- anaphylaxis    allergy     codeine- anaphylaxis    Anemia 11/3/2019    Pyelonephritis 3/14/2015    Pyelonephritis        SURGICAL HISTORY           Procedure Laterality Date     SECTION      TYMPANOSTOMY TUBE PLACEMENT           FAMILY HISTORY           Problem Relation Age of Onset    COPD Mother     Other Mother         Tube insertion x2    No Known Problems Father     No Known Problems Brother      Family Status oriented to person, place, and time. Psychiatric:         Behavior: Behavior normal.           DIAGNOSTIC RESULTS     LABS:  Labs Reviewed   VAGINITIS DNA PROBE - Abnormal; Notable for the following components:       Result Value    Direct Exam POSITIVE for Gardnerella vaginalis. (*)     All other components within normal limits   URINALYSIS WITH MICROSCOPIC - Abnormal; Notable for the following components:    Turbidity UA SLIGHTLY CLOUDY (*)     Leukocyte Esterase, Urine SMALL (*)     All other components within normal limits   POCT URINE PREGNANCY - Normal   C.TRACHOMATIS N.GONORRHOEAE DNA       All other labs were within normal range or not returned as of this dictation. EMERGENCY DEPARTMENT COURSE and DIFFERENTIAL DIAGNOSIS/MDM:   Vitals:    Vitals:    11/16/20 1458   BP: 118/81   Pulse: 79   Resp: 18   Temp: 98.6 °F (37 °C)   TempSrc: Oral   SpO2: 100%   Weight: 110 lb (49.9 kg)   Height: 5' 2\" (1.575 m)         MEDICATIONS GIVEN IN THE ED:  Medications   cefTRIAXone (ROCEPHIN) injection 250 mg (250 mg Intramuscular Given 11/16/20 1526)   azithromycin (ZITHROMAX) tablet 1,000 mg (1,000 mg Oral Given 11/16/20 1526)       CLINICAL DECISION MAKING:  The patient presented alert with a nontoxic appearance. She declined to wait here for the testing that will result today. Cultures are pending. A prescription was written for flagyl which she was advised to take if she tests positive for gardnerella and/or trichomonas. Follow up with pcp and/or OB/GYN, return to ED if condition worsens. FINAL IMPRESSION      1. Exposure to gonorrhea            Problem List  Patient Active Problem List   Diagnosis Code    Hereditary disease in family possibly affecting fetus, affecting management of mother, antepartum condition or complication K14. 2XX0    Other current maternal conditions classifiable elsewhere, antepartum O99.891    Feeling pelvic pressure in pregnancy, antepartum O26.899, R10.2    Ovarian cyst complicating pregnancy, antepartum O34.80, N83.209    Pyelonephritis N12    Renal cyst, right N28.1    Traumatic pneumothorax S27. 0XXA    Fall down stairs W10. 8XXA    Hypotension I95.9         DISPOSITION/PLAN   DISPOSITION Decision To Discharge 11/16/2020 03:18:55 PM      PATIENT REFERRED TO:   Rosette Manzanares DO  [address] [place] [address] 22.  [phone]    Schedule an appointment as soon as possible for a visit         DISCHARGE MEDICATIONS:     Discharge Medication List as of 11/16/2020  3:21 PM      START taking these medications    Details   metroNIDAZOLE (FLAGYL) 500 MG tablet Take 1 tablet by mouth 2 times daily for 7 days, Disp-14 tablet,R-0Print                 (Please note that portions of this note were completed with a voice recognition program.  Efforts were made to edit the dictations but occasionally words are mis-transcribed.)    Radhika Vidal, JARAD - [facility], APRN - CNP  11/16/20 0000

## 2020-11-16 NOTE — ED PROVIDER NOTES
TABLET    Take 1 tablet by mouth every 6 hours as needed for Pain    IBUPROFEN (ADVIL;MOTRIN) 800 MG TABLET    Take 1 tablet by mouth every 8 hours as needed for Pain     ALLERGIES     has No Known Allergies. FAMILY HISTORY     She indicated that her mother is alive. She indicated that her father is alive. She indicated that her brother is alive. SOCIAL HISTORY       Social History     Tobacco Use    Smoking status: Never Smoker    Smokeless tobacco: Never Used   Substance Use Topics    Alcohol use: No    Drug use: No       I personally evaluated and examined the patient in conjunction with the APC and agree with the assessment, treatment plan, and disposition of the patient as recorded by the APC.    Caesar Gilmore MD  Attending Emergency Physician          Caesar Gilmore MD  [phone]

## 2020-11-17 LAB
C TRACH DNA GENITAL QL NAA+PROBE: NEGATIVE
HCG, PREGNANCY URINE (POC): NEGATIVE
N. GONORRHOEAE DNA: ABNORMAL
SPECIMEN DESCRIPTION: ABNORMAL

## 2021-02-01 ENCOUNTER — HOSPITAL ENCOUNTER (EMERGENCY)
Facility: HOSPITAL | Age: 26
Discharge: HOME | End: 2021-02-01
Attending: EMERGENCY MEDICINE | Admitting: EMERGENCY MEDICINE
Payer: COMMERCIAL

## 2021-02-01 VITALS
SYSTOLIC BLOOD PRESSURE: 113 MMHG | HEART RATE: 70 BPM | RESPIRATION RATE: 18 BRPM | TEMPERATURE: 97.7 F | DIASTOLIC BLOOD PRESSURE: 67 MMHG | OXYGEN SATURATION: 98 %

## 2021-02-01 DIAGNOSIS — N39.9 URINARY PROBLEM IN FEMALE: Primary | ICD-10-CM

## 2021-02-01 LAB
BILIRUB UR QL STRIP: NEGATIVE
CLARITY UR: ABNORMAL
COLOR UR: YELLOW
EXT PREG TEST URINE: NORMAL
EXT. CONTROL ED NAV: NORMAL
GLUCOSE UR STRIP-MCNC: NEGATIVE MG/DL
HGB UR QL STRIP.AUTO: NEGATIVE
KETONES UR STRIP-MCNC: NEGATIVE MG/DL
LEUKOCYTE ESTERASE UR QL STRIP: NEGATIVE
NITRITE UR QL STRIP: NEGATIVE
PH UR STRIP.AUTO: 7 [PH]
PROT UR STRIP-MCNC: NEGATIVE MG/DL
SP GR UR STRIP.AUTO: 1.02 (ref 1–1.03)
UROBILINOGEN UR QL STRIP.AUTO: 0.2 E.U./DL

## 2021-02-01 PROCEDURE — 99284 EMERGENCY DEPT VISIT MOD MDM: CPT

## 2021-02-01 PROCEDURE — 81025 URINE PREGNANCY TEST: CPT | Performed by: PHYSICIAN ASSISTANT

## 2021-02-01 PROCEDURE — 87491 CHLMYD TRACH DNA AMP PROBE: CPT | Performed by: PHYSICIAN ASSISTANT

## 2021-02-01 PROCEDURE — 99284 EMERGENCY DEPT VISIT MOD MDM: CPT | Performed by: PHYSICIAN ASSISTANT

## 2021-02-01 PROCEDURE — 81003 URINALYSIS AUTO W/O SCOPE: CPT | Performed by: PHYSICIAN ASSISTANT

## 2021-02-01 PROCEDURE — 96372 THER/PROPH/DIAG INJ SC/IM: CPT

## 2021-02-01 PROCEDURE — 87591 N.GONORRHOEAE DNA AMP PROB: CPT | Performed by: PHYSICIAN ASSISTANT

## 2021-02-01 RX ORDER — DOXYCYCLINE HYCLATE 100 MG/1
100 CAPSULE ORAL 2 TIMES DAILY
Qty: 14 CAPSULE | Refills: 0 | Status: SHIPPED | OUTPATIENT
Start: 2021-02-01 | End: 2021-02-08

## 2021-02-01 RX ORDER — DOXYCYCLINE HYCLATE 100 MG/1
100 CAPSULE ORAL 2 TIMES DAILY
Qty: 14 CAPSULE | Refills: 0 | Status: SHIPPED | OUTPATIENT
Start: 2021-02-01 | End: 2021-02-01 | Stop reason: SDUPTHER

## 2021-02-01 RX ADMIN — LIDOCAINE HYDROCHLORIDE 500 MG: 10 INJECTION, SOLUTION EPIDURAL; INFILTRATION; INTRACAUDAL; PERINEURAL at 11:56

## 2021-02-01 NOTE — DISCHARGE INSTRUCTIONS
The exact cause of your symptoms was not yet determined  There were no signs of infection in your urine test     You were tested and treated for gonorrhea and chlamydia; those results will be available in the next few days, you will be called if positive  If symptoms persist, follow-up with your primary care doctor or GYN  as needed

## 2021-02-01 NOTE — ED PROVIDER NOTES
History  Chief Complaint   Patient presents with    Abdominal Pain     pt reports abd pain in lower pelvic area  rpeorts treated for recent uti but did not complete abx rx, reports urinary urgency  Also reports she feels she may have been exposed to an STD  denies N/V/D     Pt with no sign Past Medical History, Past Surgical History: Right ELBOW SURGERY: from elbow and arm fracture  , + sexually active, presents to ED concern for possible UTI/STI; c/o urinary frequency, some intermittent odor to urine, intermittent dysuria, mild suprapubic abd pain,  no abnormal vag dc or bleeding, no rash, no NVD, no back pain, no fever/chills            Prior to Admission Medications   Prescriptions Last Dose Informant Patient Reported? Taking?    Prenatal Vit-Fe Fumarate-FA (PRENATAL VITAMIN) 28-0 8 mg   No No   Sig: Take 1 tablet by mouth daily   acetaminophen (TYLENOL) 325 mg tablet  Self Yes No   Sig: Take 650 mg by mouth every 6 (six) hours as needed for mild pain   diphenhydrAMINE (BENADRYL) 25 mg tablet   No No   Sig: Take 1 tablet (25 mg total) by mouth every 6 (six) hours as needed (nausea)   metoclopramide (REGLAN) 10 mg tablet   No No   Sig: Take 0 5 tablets (5 mg total) by mouth every 6 (six) hours as needed (severe nausea) (Take with a Benadryl)      Facility-Administered Medications: None       Past Medical History:   Diagnosis Date         Miscarriage 2017    miscarriage x1    Urinary tract infection     Hx UTI x1 in the past    Varicella     recv'd vaccine       Past Surgical History:   Procedure Laterality Date    ELBOW SURGERY Right     ELBOW SURGERY Right     from elbow and arm fracture    FRACTURE SURGERY      INDUCED          Family History   Problem Relation Age of Onset    Cancer Mother     No Known Problems Father     No Known Problems Sister     No Known Problems Brother     Diabetes Paternal Grandfather     Breast cancer Family      I have reviewed and agree with the history as documented  E-Cigarette/Vaping     E-Cigarette/Vaping Substances     Social History     Tobacco Use    Smoking status: Former Smoker    Smokeless tobacco: Never Used   Substance Use Topics    Alcohol use: No    Drug use: No       Review of Systems   Constitutional: Negative for chills and fever  HENT: Negative for hearing loss and sore throat  Eyes: Negative for visual disturbance  Respiratory: Negative for cough and shortness of breath  Cardiovascular: Negative for chest pain and leg swelling  Gastrointestinal: Positive for abdominal pain  Negative for vomiting  Genitourinary: Positive for dysuria, frequency and urgency  Negative for genital sores, hematuria, menstrual problem, pelvic pain, vaginal bleeding, vaginal discharge and vaginal pain  Musculoskeletal: Negative for arthralgias and myalgias  Skin: Negative for color change and pallor  Neurological: Negative for dizziness, weakness, light-headedness and headaches  Hematological: Does not bruise/bleed easily  Psychiatric/Behavioral: Negative for behavioral problems  The patient is not nervous/anxious  All other systems reviewed and are negative  Physical Exam  Physical Exam  Vitals signs and nursing note reviewed  Constitutional:       General: She is not in acute distress  Appearance: She is well-developed  She is not ill-appearing  HENT:      Head: Normocephalic and atraumatic  Right Ear: External ear normal       Left Ear: External ear normal       Nose: Nose normal       Mouth/Throat:      Mouth: Mucous membranes are moist       Pharynx: Oropharynx is clear  Eyes:      Conjunctiva/sclera: Conjunctivae normal    Neck:      Musculoskeletal: Normal range of motion  Cardiovascular:      Rate and Rhythm: Normal rate and regular rhythm  Pulmonary:      Effort: Pulmonary effort is normal       Breath sounds: Normal breath sounds     Abdominal:      General: Bowel sounds are normal  Palpations: Abdomen is soft  Tenderness: There is abdominal tenderness (minimal) in the suprapubic area  There is no right CVA tenderness, left CVA tenderness, guarding or rebound  Hernia: No hernia is present  Genitourinary:     Comments: deferred  Musculoskeletal: Normal range of motion  Skin:     General: Skin is warm and dry  Capillary Refill: Capillary refill takes less than 2 seconds  Findings: No rash  Neurological:      General: No focal deficit present  Mental Status: She is alert and oriented to person, place, and time  Psychiatric:         Behavior: Behavior normal          Vital Signs  ED Triage Vitals [02/01/21 1131]   Temperature Pulse Respirations Blood Pressure SpO2   97 7 °F (36 5 °C) 70 18 113/67 98 %      Temp Source Heart Rate Source Patient Position - Orthostatic VS BP Location FiO2 (%)   Tympanic Monitor Sitting Left arm --      Pain Score       5           Vitals:    02/01/21 1131   BP: 113/67   Pulse: 70   Patient Position - Orthostatic VS: Sitting         Visual Acuity      ED Medications  Medications   cefTRIAXone (ROCEPHIN) 500 mg in lidocaine (PF) (XYLOCAINE-MPF) 1 % IM only syringe (500 mg Intramuscular Given 2/1/21 1156)       Diagnostic Studies  Results Reviewed     Procedure Component Value Units Date/Time    UA w Reflex to Microscopic w Reflex to Culture [[id number]]  (Abnormal) Collected: 02/01/21 1155    Lab Status: Final result Specimen: Urine, Clean Catch Updated: 02/01/21 1211     Color, UA Yellow     Clarity, UA Slightly Cloudy     Specific [place], UA 1 020     pH, UA 7 0     Leukocytes, UA Negative     Nitrite, UA Negative     Protein, UA Negative mg/dl      Glucose, UA Negative mg/dl      Ketones, UA Negative mg/dl      Urobilinogen, UA 0 2 E U /dl      Bilirubin, UA Negative     Blood, UA Negative    Chlamydia/GC amplified DNA by PCR [[id number]] Collected: 02/01/21 1156    Lab Status:  In process Specimen: Urine, Other Updated: 02/01/21 1208 POCT pregnancy, urine [[id number]]  (Normal) Resulted: 02/01/21 1151    Lab Status: Final result Updated: 02/01/21 1151     EXT PREG TEST UR (Ref: Negative) neg preg     Control valid control                 No orders to display              Procedures  Procedures         ED Course                             SBIRT 22yo+      Most Recent Value   SBIRT (24 yo +)   In order to provide better care to our patients, we are screening all of our patients for alcohol and drug use  Would it be okay to ask you these screening questions? Unable to answer at this time Filed at: 02/01/2021 1139                    MDM  Number of Diagnoses or Management Options  Diagnosis management comments: UA wnl, GC/C sent, pending; will treat  No other sx of infection, abd benign, no tendernss, to FU with PCP, GYN as needed       Amount and/or Complexity of Data Reviewed  Clinical lab tests: ordered and reviewed  Review and summarize past medical records: yes        Disposition  Final diagnoses:   Urinary problem in female     Time reflects when diagnosis was documented in both MDM as applicable and the Disposition within this note     Time User Action Codes Description Comment    2/1/2021 12:44 PM James Armenta Add [R35 0] Urinary frequency     2/1/2021 12:44 PM Jamestracey Caseyter Remove [R35 0] Urinary frequency     2/1/2021 12:46 PM James Armenta Add [N39 9] Urinary problem in female       ED Disposition     ED Disposition Condition Date/Time Comment    Discharge Stable Mon Feb 1, 2021 12:43 PM Cj Gain discharge to home/self care              Follow-up Information     Follow up With Specialties Details Why Contact Info    Your PCP or GYN   As needed           Patient's Medications   Discharge Prescriptions    DOXYCYCLINE HYCLATE (VIBRAMYCIN) 100 MG CAPSULE    Take 1 capsule (100 mg total) by mouth 2 (two) times a day for 7 days       Start Date: 2/1/2021  End Date: 2/8/2021       Order Dose: 100 mg       Quantity: 14 capsule Refills: 0     No discharge procedures on file      PDMP Review     None          ED Provider  Electronically Signed by           Lance Banad PA-C  02/01/21 6188

## 2021-02-04 LAB
C TRACH DNA SPEC QL NAA+PROBE: NEGATIVE
N GONORRHOEA DNA SPEC QL NAA+PROBE: NEGATIVE

## 2021-02-25 ENCOUNTER — HOSPITAL ENCOUNTER (EMERGENCY)
Age: 26
Discharge: HOME | End: 2021-02-25
Attending: EMERGENCY MEDICINE
Payer: COMMERCIAL

## 2021-02-25 ENCOUNTER — APPOINTMENT (OUTPATIENT)
Dept: GENERAL RADIOLOGY | Age: 26
End: 2021-02-25
Payer: COMMERCIAL

## 2021-02-25 VITALS
HEART RATE: 77 BPM | RESPIRATION RATE: 16 BRPM | OXYGEN SATURATION: 100 % | WEIGHT: 115 LBS | DIASTOLIC BLOOD PRESSURE: 66 MMHG | HEIGHT: 62 IN | SYSTOLIC BLOOD PRESSURE: 102 MMHG | BODY MASS INDEX: 21.16 KG/M2 | TEMPERATURE: 98 F

## 2021-02-25 DIAGNOSIS — M79.18 MUSCULOSKELETAL PAIN: ICD-10-CM

## 2021-02-25 DIAGNOSIS — R07.81 RIB PAIN ON LEFT SIDE: Primary | ICD-10-CM

## 2021-02-25 PROCEDURE — 6370000000 HC RX 637 (ALT 250 FOR IP): Performed by: EMERGENCY MEDICINE

## 2021-02-25 PROCEDURE — 71101 X-RAY EXAM UNILAT RIBS/CHEST: CPT

## 2021-02-25 PROCEDURE — 99284 EMERGENCY DEPT VISIT MOD MDM: CPT

## 2021-02-25 RX ORDER — IBUPROFEN 800 MG/1
800 TABLET ORAL ONCE
Status: COMPLETED | OUTPATIENT
Start: 2021-02-25 | End: 2021-02-25

## 2021-02-25 RX ORDER — ACETAMINOPHEN 500 MG
1000 TABLET ORAL ONCE
Status: COMPLETED | OUTPATIENT
Start: 2021-02-25 | End: 2021-02-25

## 2021-02-25 RX ADMIN — ACETAMINOPHEN 1000 MG: 500 TABLET ORAL at 21:30

## 2021-02-25 RX ADMIN — IBUPROFEN 800 MG: 800 TABLET, FILM COATED ORAL at 21:30

## 2021-02-25 ASSESSMENT — PAIN DESCRIPTION - PROGRESSION: CLINICAL_PROGRESSION: NOT CHANGED

## 2021-02-25 ASSESSMENT — PAIN SCALES - GENERAL: PAINLEVEL_OUTOF10: 6

## 2021-02-25 NOTE — LETTER
[facility]  [address] [id number]  Phone: [phone]             February 25, 2021    Patient: Ragini Alvarez   YOB: 1995   Date of Visit: 2/25/2021       To Whom It May Concern:    Niranjan Hagan was seen and treated in our emergency department on 2/25/2021. She may return to work on Friday, February 26 2021.       Sincerely,             Signature:__________________________________

## 2021-02-26 NOTE — ED PROVIDER NOTES
EMERGENCY DEPARTMENT ENCOUNTER    Pt Name: Joey Chapin  MRN: [id number]  [place] 1995  Date of evaluation: 21  CHIEF COMPLAINT       Chief Complaint   Patient presents with    Rib Pain     left sided, 3-4 days may have injured self at work      HISTORY OF PRESENT ILLNESS   Patient is a 80-year-old female who presents to the ED for evaluation of left-sided rib pain. 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 Pain radiates up and down left side of rib cage. Pain worse with activity. Pain improves with rest.  No fever, cough, shortness of breath, chest pain, abdominal pain, nausea, vomiting, changes in urine or stool. REVIEW OF SYSTEMS     Review of Systems   All other systems reviewed and are negative. PASTMEDICAL HISTORY     Past Medical History:   Diagnosis Date    Allergic     codeine- anaphylaxis    allergy     codeine- anaphylaxis    Anemia 11/3/2019    Pyelonephritis 3/14/2015    Pyelonephritis      SURGICAL HISTORY       Past Surgical History:   Procedure Laterality Date     SECTION      TYMPANOSTOMY TUBE PLACEMENT       CURRENT MEDICATIONS       Discharge Medication List as of 2021  9:58 PM      CONTINUE these medications which have NOT CHANGED    Details   cephALEXin (KEFLEX) 500 MG capsule Take 1 capsule by mouth 2 times daily, Disp-14 capsule,R-0Print      !! ibuprofen (ADVIL;MOTRIN) 600 MG tablet Take 1 tablet by mouth every 6 hours as needed for Pain, Disp-20 tablet,R-0Print      !! ibuprofen (ADVIL;MOTRIN) 800 MG tablet Take 1 tablet by mouth every 8 hours as needed for Pain, Disp-20 tablet, R-0Print       !! - Potential duplicate medications found. Please discuss with provider. ALLERGIES     has No Known Allergies. FAMILY HISTORY     She indicated that her mother is alive. She indicated that her father is alive. She indicated that her brother is alive.      SOCIAL HISTORY data to display    EMERGENCY DEPARTMENTCOURSE:   Patient did well in the ED. X-ray of chest and ribs negative for acute fracture dislocation. Likely muscle strain. No further work-up indicated time. Nursing notes reviewed. At this time this is what I find, the patient appears well and does not appear sick or toxic. I gave my usual and customary discussion of the risks and benefits of discharge versus admission. I answered the patient's questions. I gave the patient strict return precautions. Patient expressed understanding of the discharge instructions. Dictated but not reviewed. Vitals:    Vitals:    02/25/21 2101 02/25/21 2102   BP:  102/66   Pulse: 77    Resp: 16    Temp:  98 °F (36.7 °C)   TempSrc:  Oral   SpO2: 100%    Weight: 115 lb (52.2 kg)    Height: 5' 2\" (1.575 m)        The patient was given the following medications while in the emergency department:  Orders Placed This Encounter   Medications    acetaminophen (TYLENOL) tablet 1,000 mg    ibuprofen (ADVIL;MOTRIN) tablet 800 mg     CONSULTS:  None    FINAL IMPRESSION      1. Rib pain on left side    2.  Musculoskeletal pain          DISPOSITION/PLAN   DISPOSITION Decision To Discharge 02/25/2021 09:58:05 PM      PATIENT REFERRED TO:  Ginny Acuña DO  [address] [place] [id number]  [phone]    In 2 days      DISCHARGE MEDICATIONS:  Discharge Medication List as of 2/25/2021  9:58 PM        Kumar Richardson MD  Attending Emergency Physician                    Veverly Cheadle, MD  02/26/21 0129

## 2021-03-22 ENCOUNTER — TELEPHONE (OUTPATIENT)
Dept: OBGYN CLINIC | Facility: CLINIC | Age: 26
End: 2021-03-22

## 2021-04-05 ENCOUNTER — TELEPHONE (OUTPATIENT)
Dept: OBGYN CLINIC | Facility: CLINIC | Age: 26
End: 2021-04-05

## 2021-04-05 DIAGNOSIS — B96.89 BACTERIAL VAGINOSIS: Primary | ICD-10-CM

## 2021-04-05 DIAGNOSIS — N76.0 BACTERIAL VAGINOSIS: Primary | ICD-10-CM

## 2021-04-05 DIAGNOSIS — N76.0 BV (BACTERIAL VAGINOSIS): Primary | ICD-10-CM

## 2021-04-05 DIAGNOSIS — B96.89 BV (BACTERIAL VAGINOSIS): Primary | ICD-10-CM

## 2021-04-05 RX ORDER — METRONIDAZOLE 500 MG/1
500 TABLET ORAL EVERY 12 HOURS SCHEDULED
Qty: 14 TABLET | Refills: 0 | Status: CANCELLED | OUTPATIENT
Start: 2021-04-05 | End: 2021-04-12

## 2021-04-05 RX ORDER — METRONIDAZOLE 500 MG/1
500 TABLET ORAL EVERY 12 HOURS SCHEDULED
Qty: 14 TABLET | Refills: 0 | Status: SHIPPED | OUTPATIENT
Start: 2021-04-05 | End: 2021-04-12

## 2021-04-05 NOTE — TELEPHONE ENCOUNTER
Patient complaining of BV, states has been taking probiotics but is not helping with sx's  Would like antibiotic called in   Annual moved up to 4/20/21 due to recurrent infection

## 2021-06-04 ENCOUNTER — HOSPITAL ENCOUNTER (EMERGENCY)
Age: 26
Discharge: HOME | End: 2021-06-04
Attending: EMERGENCY MEDICINE
Payer: COMMERCIAL

## 2021-06-04 VITALS
OXYGEN SATURATION: 97 % | DIASTOLIC BLOOD PRESSURE: 65 MMHG | SYSTOLIC BLOOD PRESSURE: 104 MMHG | HEIGHT: 62 IN | BODY MASS INDEX: 19.4 KG/M2 | WEIGHT: 105.4 LBS | RESPIRATION RATE: 14 BRPM | TEMPERATURE: 101.7 F | HEART RATE: 103 BPM

## 2021-06-04 DIAGNOSIS — J02.0 STREP PHARYNGITIS: Primary | ICD-10-CM

## 2021-06-04 LAB
DIRECT EXAM: ABNORMAL
Lab: ABNORMAL
SPECIMEN DESCRIPTION: ABNORMAL

## 2021-06-04 PROCEDURE — 87880 STREP A ASSAY W/OPTIC: CPT

## 2021-06-04 PROCEDURE — 6370000000 HC RX 637 (ALT 250 FOR IP): Performed by: EMERGENCY MEDICINE

## 2021-06-04 PROCEDURE — 99284 EMERGENCY DEPT VISIT MOD MDM: CPT

## 2021-06-04 RX ORDER — IBUPROFEN 800 MG/1
800 TABLET ORAL ONCE
Status: COMPLETED | OUTPATIENT
Start: 2021-06-04 | End: 2021-06-04

## 2021-06-04 RX ORDER — ACETAMINOPHEN 500 MG
1000 TABLET ORAL ONCE
Status: COMPLETED | OUTPATIENT
Start: 2021-06-04 | End: 2021-06-04

## 2021-06-04 RX ORDER — PENICILLIN V POTASSIUM 500 MG/1
500 TABLET ORAL 4 TIMES DAILY
Qty: 40 TABLET | Refills: 0 | Status: SHIPPED | OUTPATIENT
Start: 2021-06-04 | End: 2021-06-04 | Stop reason: SDUPTHER

## 2021-06-04 RX ORDER — PENICILLIN V POTASSIUM 500 MG/1
500 TABLET ORAL 3 TIMES DAILY
Qty: 30 TABLET | Refills: 0 | Status: SHIPPED | OUTPATIENT
Start: 2021-06-04 | End: 2021-06-14

## 2021-06-04 RX ADMIN — IBUPROFEN 800 MG: 800 TABLET, FILM COATED ORAL at 02:14

## 2021-06-04 RX ADMIN — ACETAMINOPHEN 1000 MG: 500 TABLET ORAL at 02:14

## 2021-06-04 ASSESSMENT — PAIN DESCRIPTION - PROGRESSION: CLINICAL_PROGRESSION: GRADUALLY IMPROVING

## 2021-06-04 ASSESSMENT — PAIN SCALES - GENERAL
PAINLEVEL_OUTOF10: 8
PAINLEVEL_OUTOF10: 8

## 2021-06-04 NOTE — ED PROVIDER NOTES
EMERGENCY DEPARTMENT ENCOUNTER    Pt Name: Sandeep Vera  MRN: [id number]  [place] 1995  Date of evaluation: 21  CHIEF COMPLAINT       Chief Complaint   Patient presents with    Pharyngitis    Generalized Body Aches     HISTORY OF PRESENT ILLNESS   Patient is a 59-year-old female who presents to the ED complaining of sore throat. Symptoms started yesterday. Also has fever. No cough. No other issues at this time. No shortness of breath, chest pain, abdominal pain, nausea, vomiting, changes in urine or stool. REVIEW OF SYSTEMS     Review of Systems   All other systems reviewed and are negative. PASTMEDICAL HISTORY     Past Medical History:   Diagnosis Date    Allergic     codeine- anaphylaxis    allergy     codeine- anaphylaxis    Anemia 11/3/2019    Pyelonephritis 3/14/2015    Pyelonephritis      SURGICAL HISTORY       Past Surgical History:   Procedure Laterality Date     SECTION      TYMPANOSTOMY TUBE PLACEMENT       CURRENT MEDICATIONS       Current Discharge Medication List      CONTINUE these medications which have NOT CHANGED    Details   cephALEXin (KEFLEX) 500 MG capsule Take 1 capsule by mouth 2 times daily  Qty: 14 capsule, Refills: 0      !! ibuprofen (ADVIL;MOTRIN) 600 MG tablet Take 1 tablet by mouth every 6 hours as needed for Pain  Qty: 20 tablet, Refills: 0      !! ibuprofen (ADVIL;MOTRIN) 800 MG tablet Take 1 tablet by mouth every 8 hours as needed for Pain  Qty: 20 tablet, Refills: 0       !! - Potential duplicate medications found. Please discuss with provider. ALLERGIES     has No Known Allergies. FAMILY HISTORY     She indicated that her mother is alive. She indicated that her father is alive. She indicated that her brother is alive.      SOCIAL HISTORY       Social History     Tobacco Use    Smoking status: Never Smoker    Smokeless tobacco: Never Used   Vaping Use    Vaping Use: Never used   Substance Use Topics    Alcohol use: No    Drug use: No     PHYSICAL EXAM     INITIAL VITALS: /65   Pulse 103   Temp 101.7 °F (38.7 °C) (Oral)   Resp 14   Ht 5' 2\" (1.575 m)   Wt 105 lb 6.4 oz (47.8 kg)   LMP 05/20/2021   SpO2 97%   BMI 19.28 kg/m²    Physical Exam  HENT:      Head: Normocephalic. Right Ear: External ear normal.      Left Ear: External ear normal.      Nose: Nose normal.      Mouth/Throat:      Pharynx: Oropharyngeal exudate and posterior oropharyngeal erythema present. Eyes:      Conjunctiva/sclera: Conjunctivae normal.   Cardiovascular:      Rate and Rhythm: Normal rate. Pulmonary:      Effort: Pulmonary effort is normal.   Abdominal:      General: Abdomen is flat. Skin:     General: Skin is dry. Neurological:      Mental Status: She is alert. Mental status is at baseline. Psychiatric:         Mood and Affect: Mood normal.         Behavior: Behavior normal.         MEDICAL DECISION MAKING:   The patient is hemodynamically stable, febrile, nontoxic-appearing. Physical exam notable for posterior orpharynx with exudate and erythema. Based on history and exam likely group A strep. ED plan for rapid strep, Tylenol, ibuprofen, reassess. DIAGNOSTIC RESULTS   EKG:All EKG's are interpreted by the Emergency Department Physician who either signs or Co-signs this chart in the absence of a cardiologist.        RADIOLOGY:All plain film, CT, MRI, and formal ultrasound images (except ED bedside ultrasound) are read by the radiologist, see reports below, unless otherwisenoted in MDM or here. No orders to display     LABS: All lab results were reviewed by myself, and all abnormals are listed below. Labs Reviewed   STREP SCREEN GROUP A THROAT - Abnormal; Notable for the following components:       Result Value    Direct Exam POSITIVE for Group A Streptococci (*)     All other components within normal limits       EMERGENCY DEPARTMENTCOURSE:   Patient did well in the ED  Given Tylenol and ibuprofen in the ED.   Given Rx for

## 2021-06-23 ENCOUNTER — TELEPHONE (OUTPATIENT)
Dept: OBGYN CLINIC | Facility: CLINIC | Age: 26
End: 2021-06-23

## 2021-06-23 ENCOUNTER — APPOINTMENT (EMERGENCY)
Dept: ULTRASOUND IMAGING | Facility: HOSPITAL | Age: 26
End: 2021-06-23
Payer: COMMERCIAL

## 2021-06-23 ENCOUNTER — HOSPITAL ENCOUNTER (EMERGENCY)
Facility: HOSPITAL | Age: 26
Discharge: HOME | End: 2021-06-23
Attending: EMERGENCY MEDICINE | Admitting: EMERGENCY MEDICINE
Payer: COMMERCIAL

## 2021-06-23 VITALS
WEIGHT: 150.79 LBS | RESPIRATION RATE: 16 BRPM | OXYGEN SATURATION: 97 % | DIASTOLIC BLOOD PRESSURE: 58 MMHG | SYSTOLIC BLOOD PRESSURE: 110 MMHG | HEART RATE: 78 BPM | TEMPERATURE: 97.7 F | BODY MASS INDEX: 28.49 KG/M2

## 2021-06-23 DIAGNOSIS — Z3A.01 LESS THAN 8 WEEKS GESTATION OF PREGNANCY: Primary | ICD-10-CM

## 2021-06-23 LAB
ALBUMIN SERPL BCP-MCNC: 3.6 G/DL (ref 3.5–5)
ALP SERPL-CCNC: 43 U/L (ref 46–116)
ALT SERPL W P-5'-P-CCNC: 13 U/L (ref 12–78)
ANION GAP SERPL CALCULATED.3IONS-SCNC: 10 MMOL/L (ref 4–13)
AST SERPL W P-5'-P-CCNC: 11 U/L (ref 5–45)
B-HCG SERPL-ACNC: ABNORMAL MIU/ML
BASOPHILS # BLD AUTO: 0.04 THOUSANDS/ΜL (ref 0–0.1)
BASOPHILS NFR BLD AUTO: 1 % (ref 0–1)
BILIRUB SERPL-MCNC: 0.4 MG/DL (ref 0.2–1)
BILIRUB UR QL STRIP: NEGATIVE
BUN SERPL-MCNC: 8 MG/DL (ref 5–25)
CALCIUM SERPL-MCNC: 9 MG/DL (ref 8.3–10.1)
CHLORIDE SERPL-SCNC: 102 MMOL/L (ref 100–108)
CLARITY UR: CLEAR
CO2 SERPL-SCNC: 25 MMOL/L (ref 21–32)
COLOR UR: YELLOW
CREAT SERPL-MCNC: 0.77 MG/DL (ref 0.6–1.3)
EOSINOPHIL # BLD AUTO: 0.18 THOUSAND/ΜL (ref 0–0.61)
EOSINOPHIL NFR BLD AUTO: 2 % (ref 0–6)
ERYTHROCYTE [DISTWIDTH] IN BLOOD BY AUTOMATED COUNT: 11.9 % (ref 11.6–15.1)
EXT PREG TEST URINE: POSITIVE
EXT. CONTROL ED NAV: ABNORMAL
GFR SERPL CREATININE-BSD FRML MDRD: 125 ML/MIN/1.73SQ M
GLUCOSE SERPL-MCNC: 94 MG/DL (ref 65–140)
GLUCOSE UR STRIP-MCNC: NEGATIVE MG/DL
HCT VFR BLD AUTO: 35.1 % (ref 34.8–46.1)
HGB BLD-MCNC: 11.5 G/DL (ref 11.5–15.4)
HGB UR QL STRIP.AUTO: NEGATIVE
IMM GRANULOCYTES # BLD AUTO: 0.02 THOUSAND/UL (ref 0–0.2)
IMM GRANULOCYTES NFR BLD AUTO: 0 % (ref 0–2)
KETONES UR STRIP-MCNC: NEGATIVE MG/DL
LEUKOCYTE ESTERASE UR QL STRIP: NEGATIVE
LYMPHOCYTES # BLD AUTO: 1.25 THOUSANDS/ΜL (ref 0.6–4.47)
LYMPHOCYTES NFR BLD AUTO: 15 % (ref 14–44)
MCH RBC QN AUTO: 30.1 PG (ref 26.8–34.3)
MCHC RBC AUTO-ENTMCNC: 32.8 G/DL (ref 31.4–37.4)
MCV RBC AUTO: 92 FL (ref 82–98)
MONOCYTES # BLD AUTO: 0.53 THOUSAND/ΜL (ref 0.17–1.22)
MONOCYTES NFR BLD AUTO: 7 % (ref 4–12)
NEUTROPHILS # BLD AUTO: 6.11 THOUSANDS/ΜL (ref 1.85–7.62)
NEUTS SEG NFR BLD AUTO: 75 % (ref 43–75)
NITRITE UR QL STRIP: NEGATIVE
NRBC BLD AUTO-RTO: 0 /100 WBCS
PH UR STRIP.AUTO: 6 [PH]
PLATELET # BLD AUTO: 226 THOUSANDS/UL (ref 149–390)
PMV BLD AUTO: 10.4 FL (ref 8.9–12.7)
POTASSIUM SERPL-SCNC: 3.5 MMOL/L (ref 3.5–5.3)
PROT SERPL-MCNC: 7.5 G/DL (ref 6.4–8.2)
PROT UR STRIP-MCNC: NEGATIVE MG/DL
RBC # BLD AUTO: 3.82 MILLION/UL (ref 3.81–5.12)
SODIUM SERPL-SCNC: 137 MMOL/L (ref 136–145)
SP GR UR STRIP.AUTO: >=1.03 (ref 1–1.03)
UROBILINOGEN UR QL STRIP.AUTO: 0.2 E.U./DL
WBC # BLD AUTO: 8.13 THOUSAND/UL (ref 4.31–10.16)

## 2021-06-23 PROCEDURE — 81025 URINE PREGNANCY TEST: CPT | Performed by: EMERGENCY MEDICINE

## 2021-06-23 PROCEDURE — 84702 CHORIONIC GONADOTROPIN TEST: CPT | Performed by: EMERGENCY MEDICINE

## 2021-06-23 PROCEDURE — 81003 URINALYSIS AUTO W/O SCOPE: CPT | Performed by: EMERGENCY MEDICINE

## 2021-06-23 PROCEDURE — 36415 COLL VENOUS BLD VENIPUNCTURE: CPT | Performed by: EMERGENCY MEDICINE

## 2021-06-23 PROCEDURE — 76801 OB US < 14 WKS SINGLE FETUS: CPT

## 2021-06-23 PROCEDURE — 99282 EMERGENCY DEPT VISIT SF MDM: CPT | Performed by: EMERGENCY MEDICINE

## 2021-06-23 PROCEDURE — 80053 COMPREHEN METABOLIC PANEL: CPT | Performed by: EMERGENCY MEDICINE

## 2021-06-23 PROCEDURE — 85025 COMPLETE CBC W/AUTO DIFF WBC: CPT | Performed by: EMERGENCY MEDICINE

## 2021-06-23 PROCEDURE — 99285 EMERGENCY DEPT VISIT HI MDM: CPT

## 2021-06-23 NOTE — ED PROVIDER NOTES
History  Chief Complaint   Patient presents with    Weakness - Generalized     pt c/o increased weakness over the past week  pt states she had a miscarriage over a month ago and had a positive pregnancy test a week ago  pt c/o lower back pain     Patient is a 43-year-old female  She is a  3 para 1  Last menstrual period is unknown  Patient reports that she had been pregnant and about month ago had vaginal bleeding  She had assumed she miscarried  She did not seek medical care  Since that time, however, patient reports that she has felt weak and has had poor appetite, almost as if she was pregnant again couple days ago she took a pregnancy test and was positive  She does report some back pain  No abdominal pain  No vaginal bleeding at this time  No urinary complaints  Symptoms are moderate in severity  Patient is rh negative          Prior to Admission Medications   Prescriptions Last Dose Informant Patient Reported? Taking?    Prenatal Vit-Fe Fumarate-FA (PRENATAL VITAMIN) 28-0 8 mg   No No   Sig: Take 1 tablet by mouth daily   acetaminophen (TYLENOL) 325 mg tablet  Self Yes No   Sig: Take 650 mg by mouth every 6 (six) hours as needed for mild pain   diphenhydrAMINE (BENADRYL) 25 mg tablet   No No   Sig: Take 1 tablet (25 mg total) by mouth every 6 (six) hours as needed (nausea)   metoclopramide (REGLAN) 10 mg tablet   No No   Sig: Take 0 5 tablets (5 mg total) by mouth every 6 (six) hours as needed (severe nausea) (Take with a Benadryl)      Facility-Administered Medications: None       Past Medical History:   Diagnosis Date         Miscarriage 2017    miscarriage x1    Urinary tract infection     Hx UTI x1 in the past    Varicella     recv'd vaccine       Past Surgical History:   Procedure Laterality Date    ELBOW SURGERY Right     ELBOW SURGERY Right     from elbow and arm fracture    FRACTURE SURGERY      INDUCED          Family History   Problem Relation Age of Onset    Cancer Mother     No Known Problems Father     No Known Problems Sister     No Known Problems Brother     Diabetes Paternal Grandfather     Breast cancer Family      I have reviewed and agree with the history as documented  E-Cigarette/Vaping    E-Cigarette Use Never User      E-Cigarette/Vaping Substances     Social History     Tobacco Use    Smoking status: Former Smoker    Smokeless tobacco: Never Used   Vaping Use    Vaping Use: Never used   Substance Use Topics    Alcohol use: No    Drug use: No       Review of Systems   Constitutional: Negative for chills and fever  HENT: Negative for rhinorrhea and sore throat  Eyes: Negative for pain, redness and visual disturbance  Respiratory: Negative for cough and shortness of breath  Cardiovascular: Negative for chest pain and leg swelling  Gastrointestinal: Negative for abdominal pain, diarrhea and vomiting  Endocrine: Negative for polydipsia and polyuria  Genitourinary: Negative for dysuria, frequency, hematuria, vaginal bleeding and vaginal discharge  Musculoskeletal: Positive for back pain  Negative for neck pain  Skin: Negative for rash and wound  Allergic/Immunologic: Negative for immunocompromised state  Neurological: Positive for weakness  Negative for numbness and headaches  Hematological: Does not bruise/bleed easily  Psychiatric/Behavioral: Negative for hallucinations and suicidal ideas  All other systems reviewed and are negative  Physical Exam  Physical Exam  Vitals reviewed  Constitutional:       General: She is not in acute distress  HENT:      Head: Normocephalic and atraumatic  Nose: Nose normal       Mouth/Throat:      Mouth: Mucous membranes are moist    Eyes:      General:         Right eye: No discharge  Left eye: No discharge  Conjunctiva/sclera: Conjunctivae normal    Cardiovascular:      Rate and Rhythm: Normal rate and regular rhythm  Pulses: Normal pulses  Heart sounds: Normal heart sounds  No murmur heard  No friction rub  No gallop  Pulmonary:      Effort: Pulmonary effort is normal  No respiratory distress  Breath sounds: Normal breath sounds  No stridor  No wheezing, rhonchi or rales  Abdominal:      General: Bowel sounds are normal  There is no distension  Palpations: Abdomen is soft  Tenderness: There is no abdominal tenderness  There is no right CVA tenderness, left CVA tenderness, guarding or rebound  Musculoskeletal:         General: No swelling, tenderness, deformity or signs of injury  Normal range of motion  Cervical back: Normal range of motion and neck supple  No rigidity  Right lower leg: No edema  Left lower leg: No edema  Comments: No calf tenderness or unilateral leg swelling  Skin:     General: Skin is warm and dry  Coloration: Skin is not jaundiced  Findings: No rash  Neurological:      General: No focal deficit present  Mental Status: She is alert and oriented to person, place, and time  Sensory: No sensory deficit  Motor: Motor function is intact     Psychiatric:         Mood and Affect: Mood normal          Behavior: Behavior normal          Vital Signs  ED Triage Vitals [06/23/21 1611]   Temperature Pulse Respirations Blood Pressure SpO2   97 7 °F (36 5 °C) 78 16 110/58 97 %      Temp src Heart Rate Source Patient Position - Orthostatic VS BP Location FiO2 (%)   -- Monitor Lying Right arm --      Pain Score       8           Vitals:    06/23/21 1611   BP: 110/58   Pulse: 78   Patient Position - Orthostatic VS: Lying         Visual Acuity  Visual Acuity      Most Recent Value   L Pupil Size (mm)  3   R Pupil Size (mm)  3          ED Medications  Medications - No data to display    Diagnostic Studies  Results Reviewed     Procedure Component Value Units Date/Time    Quantitative hCG [[id number]]  (Abnormal) Collected: 06/23/21 1636    Lab Status: Final result Specimen: Blood from Arm, Left Updated: 06/23/21 1718     HCG, Quant 65,716 6 mIU/mL     Narrative:       Expected Ranges:     Approximate               Approximate HCG  Gestation age          Concentration ( mIU/mL)  _____________          ______________________   Herman Guo                      HCG values  0 2-1                       5-50  1-2                           2-3                         100-5000  3-4                         500-13706  4-5                         1000-04528  5-6                         [id number]  6-8                         59086-726139  8-12                        [id number]      Comprehensive metabolic panel [[id number]]  (Abnormal) Collected: 06/23/21 1636    Lab Status: Final result Specimen: Blood from Arm, Left Updated: 06/23/21 1658     Sodium 137 mmol/L      Potassium 3 5 mmol/L      Chloride 102 mmol/L      CO2 25 mmol/L      ANION GAP 10 mmol/L      BUN 8 mg/dL      Creatinine 0 77 mg/dL      Glucose 94 mg/dL      Calcium 9 0 mg/dL      AST 11 U/L      ALT 13 U/L      Alkaline Phosphatase 43 U/L      Total Protein 7 5 g/dL      Albumin 3 6 g/dL      Total Bilirubin 0 40 mg/dL      eGFR 125 ml/min/1 73sq m     Narrative:      National Kidney Disease Foundation guidelines for Chronic Kidney Disease (CKD):     Stage 1 with normal or high GFR (GFR > 90 mL/min/1 73 square meters)    Stage 2 Mild CKD (GFR = 60-89 mL/min/1 73 square meters)    Stage 3A Moderate CKD (GFR = 45-59 mL/min/1 73 square meters)    Stage 3B Moderate CKD (GFR = 30-44 mL/min/1 73 square meters)    Stage 4 Severe CKD (GFR = 15-29 mL/min/1 73 square meters)    Stage 5 End Stage CKD (GFR <15 mL/min/1 73 square meters)  Note: GFR calculation is accurate only with a steady state creatinine    UA w Reflex to Microscopic w Reflex to Culture [[id number]] Collected: 06/23/21 1635    Lab Status: Final result Specimen: Urine, Clean Catch Updated: 06/23/21 1642     Color, UA Yellow     Clarity, UA Clear     Specific Superior, UA >=1 030     pH, UA 6 0     Leukocytes, UA Negative     Nitrite, UA Negative     Protein, UA Negative mg/dl      Glucose, UA Negative mg/dl      Ketones, UA Negative mg/dl      Urobilinogen, UA 0 2 E U /dl      Bilirubin, UA Negative     Blood, UA Negative    CBC and differential [[id number]] Collected: 06/23/21 1636    Lab Status: Final result Specimen: Blood from Arm, Left Updated: 06/23/21 1643     WBC 8 13 Thousand/uL      RBC 3 82 Million/uL      Hemoglobin 11 5 g/dL      Hematocrit 35 1 %      MCV 92 fL      MCH 30 1 pg      MCHC 32 8 g/dL      RDW 11 9 %      MPV 10 4 fL      Platelets 478 Thousands/uL      nRBC 0 /100 WBCs      Neutrophils Relative 75 %      Immat GRANS % 0 %      Lymphocytes Relative 15 %      Monocytes Relative 7 %      Eosinophils Relative 2 %      Basophils Relative 1 %      Neutrophils Absolute 6 11 Thousands/µL      Immature Grans Absolute 0 02 Thousand/uL      Lymphocytes Absolute 1 25 Thousands/µL      Monocytes Absolute 0 53 Thousand/µL      Eosinophils Absolute 0 18 Thousand/µL      Basophils Absolute 0 04 Thousands/µL     POCT pregnancy, urine [[id number]]  (Abnormal) Resulted: 06/23/21 1642    Lab Status: Final result Updated: 06/23/21 1642     EXT PREG TEST UR (Ref: Negative) positive     Control valid                 US OB < 14 weeks with transvaginal   Final Result by Shaheed Mayfield MD (06/23 1727)      Single live intrauterine gestation at 6 weeks 6 days (range +/- 3 days)  MARILEE of 2/10/2022  Workstation performed: [id number]                    Procedures  Procedures         ED Course                             SBIRT 20yo+      Most Recent Value   SBIRT (23 yo +)   In order to provide better care to our patients, we are screening all of our patients for alcohol and drug use  Would it be okay to ask you these screening questions? Yes Filed at: 06/23/2021 1644   Initial Alcohol Screen: US AUDIT-C    1   How often do you have a drink containing alcohol?  0 Filed at: 06/23/2021 1644   2  How many drinks containing alcohol do you have on a typical day you are drinking? 0 Filed at: 06/23/2021 1644   3a  Male UNDER 65: How often do you have five or more drinks on one occasion? 0 Filed at: 06/23/2021 1644   3b  FEMALE Any Age, or MALE 65+: How often do you have 4 or more drinks on one occassion? 0 Filed at: 06/23/2021 1644   Audit-C Score  0 Filed at: 06/23/2021 1644   EUNICE: How many times in the past year have you    Used an illegal drug or used a prescription medication for non-medical reasons? Never Filed at: 06/23/2021 1644                    MDM  Number of Diagnoses or Management Options  Diagnosis management comments: Patient is pregnant  Quantitative beta hCG is adequately elevated  Ultrasound shows a 6 week live IUP  There is no vaginal bleeding or abdominal pain to suggest threatened AB  She is Rh negative but does not need RhoGAM at this time  Appropriate for discharge and outpatient management  There is no urinary tract infection  Amount and/or Complexity of Data Reviewed  Clinical lab tests: ordered and reviewed  Tests in the radiology section of CPT®: ordered and reviewed        Disposition  Final diagnoses:   Less than 8 weeks gestation of pregnancy     Time reflects when diagnosis was documented in both MDM as applicable and the Disposition within this note     Time User Action Codes Description Comment    6/23/2021  5:55 PM [place] [Z3A 01] Less than 8 weeks gestation of pregnancy       ED Disposition     ED Disposition Condition Date/Time Comment    Discharge Stable Wed Jun 23, 2021  5:55 PM Mary Alice Dan discharge to home/self care  Follow-up Information     Follow up With Specialties Details Why Contact Info    Follow-up with your OBGYN within 1 week, sooner if any problem              Patient's Medications   Discharge Prescriptions    No medications on file     No discharge procedures on file      PDMP Review None          ED Provider  Electronically Signed by           Sherry Whitehead MD  06/23/21 9967

## 2021-06-23 NOTE — TELEPHONE ENCOUNTER
Pt had a miscarriage  Last month , and pregnancy test was negative  2 weeks ago, and she  Has pregnancy symptoms, and took a test a few days  ago and is positive, she did have unproductive sexual intercourse , please advise

## 2021-06-24 NOTE — TELEPHONE ENCOUNTER
Please give script for quantitative HCG if neg and desires contraception to rto to discuss  If pos to rto for pregnancy care

## 2021-07-04 ENCOUNTER — HOSPITAL ENCOUNTER (EMERGENCY)
Age: 26
Discharge: HOME | End: 2021-07-04
Attending: EMERGENCY MEDICINE
Payer: COMMERCIAL

## 2021-07-04 VITALS
HEIGHT: 62 IN | SYSTOLIC BLOOD PRESSURE: 108 MMHG | HEART RATE: 69 BPM | DIASTOLIC BLOOD PRESSURE: 81 MMHG | OXYGEN SATURATION: 97 % | BODY MASS INDEX: 20.24 KG/M2 | WEIGHT: 110 LBS | RESPIRATION RATE: 20 BRPM | TEMPERATURE: 97.9 F

## 2021-07-04 DIAGNOSIS — N30.01 ACUTE CYSTITIS WITH HEMATURIA: Primary | ICD-10-CM

## 2021-07-04 LAB
-: ABNORMAL
ABSOLUTE EOS #: <0.03 K/UL (ref 0–0.44)
ABSOLUTE IMMATURE GRANULOCYTE: 0.03 K/UL (ref 0–0.3)
ABSOLUTE LYMPH #: 2.14 K/UL (ref 1.1–3.7)
ABSOLUTE MONO #: 0.81 K/UL (ref 0.1–1.2)
ALBUMIN SERPL-MCNC: 4.6 G/DL (ref 3.5–5.2)
ALBUMIN/GLOBULIN RATIO: ABNORMAL (ref 1–2.5)
ALP BLD-CCNC: 81 U/L (ref 35–104)
ALT SERPL-CCNC: 8 U/L (ref 5–33)
AMORPHOUS: ABNORMAL
ANION GAP SERPL CALCULATED.3IONS-SCNC: 15 MMOL/L (ref 9–17)
AST SERPL-CCNC: 13 U/L
BACTERIA: ABNORMAL
BASOPHILS # BLD: 1 % (ref 0–2)
BASOPHILS ABSOLUTE: 0.05 K/UL (ref 0–0.2)
BILIRUB SERPL-MCNC: 0.75 MG/DL (ref 0.3–1.2)
BILIRUBIN URINE: NEGATIVE
BUN BLDV-MCNC: 19 MG/DL (ref 6–20)
BUN/CREAT BLD: 21 (ref 9–20)
CALCIUM SERPL-MCNC: 9.2 MG/DL (ref 8.6–10.4)
CASTS UA: ABNORMAL /LPF
CHLORIDE BLD-SCNC: 102 MMOL/L (ref 98–107)
CHP ED QC CHECK: NORMAL
CO2: 24 MMOL/L (ref 20–31)
COLOR: YELLOW
COMMENT UA: ABNORMAL
CREAT SERPL-MCNC: 0.92 MG/DL (ref 0.5–0.9)
CRYSTALS, UA: ABNORMAL /HPF
DIFFERENTIAL TYPE: ABNORMAL
EOSINOPHILS RELATIVE PERCENT: 0 % (ref 1–4)
EPITHELIAL CELLS UA: ABNORMAL /HPF (ref 0–5)
GFR AFRICAN AMERICAN: >60 ML/MIN
GFR NON-AFRICAN AMERICAN: >60 ML/MIN
GFR SERPL CREATININE-BSD FRML MDRD: ABNORMAL ML/MIN/{1.73_M2}
GFR SERPL CREATININE-BSD FRML MDRD: ABNORMAL ML/MIN/{1.73_M2}
GLUCOSE BLD-MCNC: 112 MG/DL (ref 70–99)
GLUCOSE URINE: NEGATIVE
HCT VFR BLD CALC: 40.7 % (ref 36.3–47.1)
HEMOGLOBIN: 13.1 G/DL (ref 11.9–15.1)
IMMATURE GRANULOCYTES: 0 %
KETONES, URINE: ABNORMAL
LEUKOCYTE ESTERASE, URINE: NEGATIVE
LYMPHOCYTES # BLD: 22 % (ref 24–43)
MAGNESIUM: 2.1 MG/DL (ref 1.6–2.6)
MCH RBC QN AUTO: 27 PG (ref 25.2–33.5)
MCHC RBC AUTO-ENTMCNC: 32.2 G/DL (ref 28.4–34.8)
MCV RBC AUTO: 83.7 FL (ref 82.6–102.9)
MONOCYTES # BLD: 8 % (ref 3–12)
MUCUS: ABNORMAL
NITRITE, URINE: NEGATIVE
NRBC AUTOMATED: 0 PER 100 WBC
OTHER OBSERVATIONS UA: ABNORMAL
PDW BLD-RTO: 13.6 % (ref 11.8–14.4)
PH UA: 5.5 (ref 5–8)
PLATELET # BLD: 374 K/UL (ref 138–453)
PLATELET ESTIMATE: ABNORMAL
PMV BLD AUTO: 9.6 FL (ref 8.1–13.5)
POTASSIUM SERPL-SCNC: 3.5 MMOL/L (ref 3.7–5.3)
PREGNANCY TEST URINE, POC: NEGATIVE
PROTEIN UA: ABNORMAL
RBC # BLD: 4.86 M/UL (ref 3.95–5.11)
RBC # BLD: ABNORMAL 10*6/UL
RBC UA: ABNORMAL /HPF (ref 0–2)
RENAL EPITHELIAL, UA: ABNORMAL /HPF
SEG NEUTROPHILS: 69 % (ref 36–65)
SEGMENTED NEUTROPHILS ABSOLUTE COUNT: 6.63 K/UL (ref 1.5–8.1)
SODIUM BLD-SCNC: 141 MMOL/L (ref 135–144)
SPECIFIC GRAVITY UA: 1.04 (ref 1–1.03)
TOTAL PROTEIN: 8.7 G/DL (ref 6.4–8.3)
TRICHOMONAS: ABNORMAL
TURBIDITY: CLEAR
URINE HGB: ABNORMAL
UROBILINOGEN, URINE: NORMAL
WBC # BLD: 9.7 K/UL (ref 3.5–11.3)
WBC # BLD: ABNORMAL 10*3/UL
WBC UA: ABNORMAL /HPF (ref 0–5)
YEAST: ABNORMAL

## 2021-07-04 PROCEDURE — 85025 COMPLETE CBC W/AUTO DIFF WBC: CPT

## 2021-07-04 PROCEDURE — 81001 URINALYSIS AUTO W/SCOPE: CPT

## 2021-07-04 PROCEDURE — 36415 COLL VENOUS BLD VENIPUNCTURE: CPT

## 2021-07-04 PROCEDURE — 80053 COMPREHEN METABOLIC PANEL: CPT

## 2021-07-04 PROCEDURE — 81025 URINE PREGNANCY TEST: CPT

## 2021-07-04 PROCEDURE — 99283 EMERGENCY DEPT VISIT LOW MDM: CPT

## 2021-07-04 PROCEDURE — 83735 ASSAY OF MAGNESIUM: CPT

## 2021-07-04 PROCEDURE — 6370000000 HC RX 637 (ALT 250 FOR IP): Performed by: EMERGENCY MEDICINE

## 2021-07-04 RX ORDER — IBUPROFEN 800 MG/1
800 TABLET ORAL ONCE
Status: COMPLETED | OUTPATIENT
Start: 2021-07-04 | End: 2021-07-04

## 2021-07-04 RX ORDER — ACETAMINOPHEN 500 MG
1000 TABLET ORAL ONCE
Status: COMPLETED | OUTPATIENT
Start: 2021-07-04 | End: 2021-07-04

## 2021-07-04 RX ORDER — CEPHALEXIN 500 MG/1
500 CAPSULE ORAL ONCE
Status: COMPLETED | OUTPATIENT
Start: 2021-07-04 | End: 2021-07-04

## 2021-07-04 RX ORDER — ONDANSETRON 4 MG/1
4 TABLET, ORALLY DISINTEGRATING ORAL ONCE
Status: COMPLETED | OUTPATIENT
Start: 2021-07-04 | End: 2021-07-04

## 2021-07-04 RX ORDER — CEPHALEXIN 500 MG/1
500 CAPSULE ORAL 4 TIMES DAILY
Qty: 28 CAPSULE | Refills: 0 | Status: SHIPPED | OUTPATIENT
Start: 2021-07-04 | End: 2021-12-04

## 2021-07-04 RX ADMIN — ONDANSETRON 4 MG: 4 TABLET, ORALLY DISINTEGRATING ORAL at 02:24

## 2021-07-04 RX ADMIN — ACETAMINOPHEN 1000 MG: 500 TABLET ORAL at 02:23

## 2021-07-04 RX ADMIN — CEPHALEXIN 500 MG: 500 CAPSULE ORAL at 04:07

## 2021-07-04 RX ADMIN — IBUPROFEN 800 MG: 800 TABLET, FILM COATED ORAL at 02:24

## 2021-07-04 ASSESSMENT — PAIN SCALES - GENERAL
PAINLEVEL_OUTOF10: 7
PAINLEVEL_OUTOF10: 7

## 2021-07-04 NOTE — ED PROVIDER NOTES
EMERGENCY DEPARTMENT ENCOUNTER    Pt Name: Oneal Holstein  MRN: [id number]  [place] 1995  Date of evaluation: 21  CHIEF COMPLAINT       Chief Complaint   Patient presents with    Generalized Body Aches    Emesis     x2 days     HISTORY OF PRESENT ILLNESS   Patient is a 69-year-old female who presents to the ED for evaluation of back pain, nausea and generalized malaise. Symptoms started 2 days ago and have gradually progressed. She has decreased appetite and decreased urine output. She does not have fever, cough, shortness of breath, chest pain, abdominal pain. LMP 3 weeks ago. No vaginal discharge, vaginal bleeding. REVIEW OF SYSTEMS     Review of Systems   All other systems reviewed and are negative. PASTMEDICAL HISTORY     Past Medical History:   Diagnosis Date    Allergic     codeine- anaphylaxis    allergy     codeine- anaphylaxis    Anemia 11/3/2019    Pyelonephritis 3/14/2015    Pyelonephritis     Traumatic pneumothorax      SURGICAL HISTORY       Past Surgical History:   Procedure Laterality Date     SECTION      TYMPANOSTOMY TUBE PLACEMENT       CURRENT MEDICATIONS       Previous Medications    No medications on file     ALLERGIES     has No Known Allergies. FAMILY HISTORY     She indicated that her mother is alive. She indicated that her father is alive. She indicated that her brother is alive. SOCIAL HISTORY       Social History     Tobacco Use    Smoking status: Never Smoker    Smokeless tobacco: Never Used   Vaping Use    Vaping Use: Never used   Substance Use Topics    Alcohol use: No    Drug use: No     PHYSICAL EXAM     INITIAL VITALS: /81   Pulse 69   Temp 97.9 °F (36.6 °C) (Oral)   Resp 20   Ht 5' 2\" (1.575 m)   Wt 110 lb (49.9 kg)   LMP 2021   SpO2 97%   BMI 20.12 kg/m²    Physical Exam  HENT:      Head: Normocephalic.       Right Ear: External ear normal.      Left Ear: External ear normal.      Nose: Nose normal. Mouth/Throat:      Mouth: Mucous membranes are dry. Eyes:      Conjunctiva/sclera: Conjunctivae normal.   Cardiovascular:      Rate and Rhythm: Normal rate. Pulmonary:      Effort: Pulmonary effort is normal.   Abdominal:      General: Abdomen is flat. Skin:     General: Skin is dry. Neurological:      Mental Status: She is alert. Mental status is at baseline. Psychiatric:         Mood and Affect: Mood normal.         Behavior: Behavior normal.         MEDICAL DECISION MAKING:   The patient is hemodynamically stable, afebrile, appearing poorly hydrated. Physical exam notable for dry mucous membranes  Based on history and exam differential includes pregnancy, UTI. ED plan for basic labs, UA, reassess. DIAGNOSTIC RESULTS   EKG:All EKG's are interpreted by the Emergency Department Physician who either signs or Co-signs this chart in the absence of a cardiologist.        RADIOLOGY:All plain film, CT, MRI, and formal ultrasound images (except ED bedside ultrasound) are read by the radiologist, see reports below, unless otherwisenoted in MDM or here. No orders to display     LABS: All lab results were reviewed by myself, and all abnormals are listed below.   Labs Reviewed   CBC WITH AUTO DIFFERENTIAL - Abnormal; Notable for the following components:       Result Value    Seg Neutrophils 69 (*)     Lymphocytes 22 (*)     Eosinophils % 0 (*)     All other components within normal limits   COMPREHENSIVE METABOLIC PANEL W/ REFLEX TO MG FOR LOW K - Abnormal; Notable for the following components:    Glucose 112 (*)     CREATININE 0.92 (*)     Bun/Cre Ratio 21 (*)     Potassium 3.5 (*)     Total Protein 8.7 (*)     All other components within normal limits   URINE RT REFLEX TO CULTURE - Abnormal; Notable for the following components:    Ketones, Urine TRACE (*)     Specific Gravity, UA 1.045 (*)     Urine Hgb TRACE (*)     Protein, UA 1+ (*)     All other components within normal limits   MICROSCOPIC URINALYSIS - Abnormal; Notable for the following components:    Bacteria, UA MANY (*)     Amorphous, UA 1+ (*)     All other components within normal limits   POCT URINE PREGNANCY - Normal   MAGNESIUM       EMERGENCY DEPARTMENTCOURSE:   Patient did well in the ED. Labs:  Potassium 3.5  Creatinine 0.92  WBC 9.7  UA with bacteria. Given Keflex 500 mg p.o. in ED. Given Rx for Keflex for home. No further work-up indicated at this time. Nursing notes reviewed. At this time this is what I find, the patient appears well and does not appear sick or toxic. I gave my usual and customary discussion of the risks and benefits of discharge versus admission. I answered the patient's questions. I gave the patient strict return precautions. Patient expressed understanding of the discharge instructions. Dictated but not reviewed. Vitals:    Vitals:    07/04/21 0142 07/04/21 0144   BP:  108/81   Pulse: 69    Resp: 20    Temp: 97.9 °F (36.6 °C)    TempSrc: Oral    SpO2: 97%    Weight: 110 lb (49.9 kg)    Height: 5' 2\" (1.575 m)        The patient was given the following medications while in the emergency department:  Orders Placed This Encounter   Medications    ondansetron (ZOFRAN-ODT) disintegrating tablet 4 mg    acetaminophen (TYLENOL) tablet 1,000 mg    ibuprofen (ADVIL;MOTRIN) tablet 800 mg    cephALEXin (KEFLEX) capsule 500 mg    cephALEXin (KEFLEX) 500 MG capsule     Sig: Take 1 capsule by mouth 4 times daily     Dispense:  28 capsule     Refill:  0     CONSULTS:  None    FINAL IMPRESSION      1.  Acute cystitis with hematuria          DISPOSITION/PLAN   DISPOSITION Decision To Discharge 07/04/2021 04:03:54 AM      PATIENT REFERRED TO:  Francisco Alcala DO  [address] [place] [id number]  [phone]    In 2 days      DISCHARGE MEDICATIONS:  New Prescriptions    CEPHALEXIN (KEFLEX) 500 MG CAPSULE    Take 1 capsule by mouth 4 times daily     Nilton Noyola MD  Attending Emergency Physician                    Baldemar Price MD  07/04/21 0842

## 2021-07-06 LAB — HCG, PREGNANCY URINE (POC): NEGATIVE

## 2021-08-02 ENCOUNTER — HOSPITAL ENCOUNTER (EMERGENCY)
Facility: HOSPITAL | Age: 26
Discharge: HOME | End: 2021-08-02
Attending: EMERGENCY MEDICINE
Payer: COMMERCIAL

## 2021-08-02 VITALS
HEIGHT: 61 IN | WEIGHT: 138.45 LBS | RESPIRATION RATE: 18 BRPM | HEART RATE: 103 BPM | BODY MASS INDEX: 26.14 KG/M2 | OXYGEN SATURATION: 98 % | SYSTOLIC BLOOD PRESSURE: 117 MMHG | TEMPERATURE: 98.2 F | DIASTOLIC BLOOD PRESSURE: 70 MMHG

## 2021-08-02 DIAGNOSIS — J06.9 UPPER RESPIRATORY TRACT INFECTION, UNSPECIFIED TYPE: Primary | ICD-10-CM

## 2021-08-02 LAB — SARS-COV-2 RNA RESP QL NAA+PROBE: POSITIVE

## 2021-08-02 PROCEDURE — 87635 SARS-COV-2 COVID-19 AMP PRB: CPT

## 2021-08-02 PROCEDURE — 99283 EMERGENCY DEPT VISIT LOW MDM: CPT

## 2021-08-02 PROCEDURE — 99282 EMERGENCY DEPT VISIT SF MDM: CPT

## 2021-08-02 NOTE — ED PROVIDER NOTES
History  Chief Complaint   Patient presents with    Medical Problem     pt presents to ed via walk in with a cough congestion headache and lost of taste and smell (+) exposure to covid      28-year-old female no significant past medical history presents secondary to cough fever frontal headache mild sore throat for the past 2 days  Patient was exposed or close contact was COVID positive  She is here with a daughter who also has had low-grade fever cough and congestion  Patient is not in any severe respiratory distress patient is nontoxic in appearance she is awake alert oriented been using over-the-counter cough and cold formula for her symptoms          None       Past Medical History:   Diagnosis Date         Miscarriage 2017    miscarriage x1    Urinary tract infection     Hx UTI x1 in the past    Varicella     recv'd vaccine       Past Surgical History:   Procedure Laterality Date    ELBOW SURGERY Right     ELBOW SURGERY Right     from elbow and arm fracture    FRACTURE SURGERY      INDUCED          Family History   Problem Relation Age of Onset    Cancer Mother     No Known Problems Father     No Known Problems Sister     No Known Problems Brother     Diabetes Paternal Grandfather     Breast cancer Family      I have reviewed and agree with the history as documented  E-Cigarette/Vaping    E-Cigarette Use Never User      E-Cigarette/Vaping Substances     Social History     Tobacco Use    Smoking status: Former Smoker    Smokeless tobacco: Never Used   Vaping Use    Vaping Use: Never used   Substance Use Topics    Alcohol use: No    Drug use: No       Review of Systems   Constitutional: Positive for fever  Negative for chills  HENT: Positive for rhinorrhea  Negative for congestion  Eyes: Negative for visual disturbance  Respiratory: Positive for cough  Negative for shortness of breath  Cardiovascular: Negative for chest pain     Gastrointestinal: Negative for abdominal pain  Endocrine: Negative for cold intolerance  Genitourinary: Negative for frequency  Musculoskeletal: Negative for gait problem  Skin: Negative for rash  Neurological: Negative for dizziness  Psychiatric/Behavioral: Negative for behavioral problems and confusion  Physical Exam  Physical Exam  Vitals and nursing note reviewed  Constitutional:       Appearance: She is well-developed  HENT:      Head: Normocephalic and atraumatic  Eyes:      Conjunctiva/sclera: Conjunctivae normal       Pupils: Pupils are equal, round, and reactive to light  Cardiovascular:      Rate and Rhythm: Normal rate and regular rhythm  Heart sounds: Normal heart sounds  Pulmonary:      Effort: Pulmonary effort is normal       Breath sounds: Normal breath sounds  Abdominal:      General: Bowel sounds are normal       Palpations: Abdomen is soft  Musculoskeletal:         General: Normal range of motion  Cervical back: Normal range of motion and neck supple  Skin:     General: Skin is warm and dry  Capillary Refill: Capillary refill takes less than 2 seconds  Neurological:      Mental Status: She is alert and oriented to person, place, and time     Psychiatric:         Behavior: Behavior normal          Vital Signs  ED Triage Vitals [08/02/21 1618]   Temperature Pulse Respirations Blood Pressure SpO2   98 2 °F (36 8 °C) 103 18 117/70 98 %      Temp Source Heart Rate Source Patient Position - Orthostatic VS BP Location FiO2 (%)   Oral Monitor Sitting Left arm --      Pain Score       --           Vitals:    08/02/21 1618   BP: 117/70   Pulse: 103   Patient Position - Orthostatic VS: Sitting         Visual Acuity      ED Medications  Medications - No data to display    Diagnostic Studies  Results Reviewed     Procedure Component Value Units Date/Time    Novel Coronavirus (Covid-19),PCR [facility] - 24 Hour Routine [[id number]] Collected: 08/02/21 1629    Lab Status: No result Specimen: Nares from Nasopharyngeal Swab                  No orders to display              Procedures  Procedures         ED Course                                           MDM  Number of Diagnoses or Management Options  Diagnosis management comments: That download of the [facility] joaquin so she get the results in her  Patient is instructed return to emergency department for any severe worsening symptoms  Impression is upper respiratory infection rule out COVID patient struck knee continues over-the-counter cough medicines she is having D COVID screen performed non status she is instructed she received results within 24 hours and      Disposition  Final diagnoses:   Upper respiratory tract infection, unspecified type     Time reflects when diagnosis was documented in both MDM as applicable and the Disposition within this note     Time User Action Codes Description Comment    8/2/2021  4:30 PM Benigno Arana Add [J06 9] Upper respiratory tract infection, unspecified type       ED Disposition     ED Disposition Condition Date/Time Comment    Discharge Stable Mon Aug 2, 2021  4:30 PM Mylene Zamudio discharge to home/self care  Follow-up Information     Follow up With Specialties Details Why Contact Info Additional Information    [address] Family Medicine Schedule an appointment as soon as possible for a visit in 3 days As needed [address] [facility] [address]  [facility] [address] [place] [address], [facility] [address] [place] [address], [id number], [phone]          Patient's Medications   Discharge Prescriptions    No medications on file     No discharge procedures on file      PDMP Review     None          ED Provider  Electronically Signed by           El Freedman MD  08/02/21 9358

## 2021-08-09 ENCOUNTER — TELEPHONE (OUTPATIENT)
Dept: OBGYN CLINIC | Facility: CLINIC | Age: 26
End: 2021-08-09

## 2021-08-09 DIAGNOSIS — N76.0 BV (BACTERIAL VAGINOSIS): Primary | ICD-10-CM

## 2021-08-09 DIAGNOSIS — B96.89 BV (BACTERIAL VAGINOSIS): Primary | ICD-10-CM

## 2021-08-09 RX ORDER — METRONIDAZOLE 500 MG/1
500 TABLET ORAL EVERY 12 HOURS SCHEDULED
Qty: 14 TABLET | Refills: 0 | Status: SHIPPED | OUTPATIENT
Start: 2021-08-09 | End: 2021-08-16

## 2021-08-09 NOTE — TELEPHONE ENCOUNTER
Pt thinks she has infection, has fishy odor, wants something called in for her, Beatriz Guzman pt, positive covid

## 2021-08-15 ENCOUNTER — HOSPITAL ENCOUNTER (EMERGENCY)
Facility: CLINIC | Age: 26
Discharge: HOME | End: 2021-08-15
Attending: EMERGENCY MEDICINE
Payer: COMMERCIAL

## 2021-08-15 VITALS
TEMPERATURE: 98.2 F | HEART RATE: 85 BPM | RESPIRATION RATE: 16 BRPM | DIASTOLIC BLOOD PRESSURE: 73 MMHG | OXYGEN SATURATION: 98 % | SYSTOLIC BLOOD PRESSURE: 116 MMHG

## 2021-08-15 DIAGNOSIS — R30.0 DYSURIA: Primary | ICD-10-CM

## 2021-08-15 LAB
BILIRUBIN URINE: NEGATIVE
COLOR: YELLOW
COMMENT UA: NORMAL
GLUCOSE URINE: NEGATIVE
KETONES, URINE: NEGATIVE
LEUKOCYTE ESTERASE, URINE: NEGATIVE
NITRITE, URINE: NEGATIVE
PH UA: 7 (ref 5–8)
PROTEIN UA: NEGATIVE
SPECIFIC GRAVITY UA: 1.02 (ref 1–1.03)
TURBIDITY: CLEAR
URINE HGB: NEGATIVE
UROBILINOGEN, URINE: NORMAL

## 2021-08-15 PROCEDURE — 99284 EMERGENCY DEPT VISIT MOD MDM: CPT

## 2021-08-15 PROCEDURE — 81003 URINALYSIS AUTO W/O SCOPE: CPT

## 2021-08-15 ASSESSMENT — ENCOUNTER SYMPTOMS
NAUSEA: 0
VOMITING: 0
ABDOMINAL PAIN: 0
DIARRHEA: 0
SHORTNESS OF BREATH: 0
BACK PAIN: 0
CONSTIPATION: 0
PHOTOPHOBIA: 0
COUGH: 0
SORE THROAT: 0

## 2021-08-15 ASSESSMENT — PAIN SCALES - GENERAL: PAINLEVEL_OUTOF10: 5

## 2021-08-16 NOTE — ED PROVIDER NOTES
[facility]  [address] [facility]  Phone: [phone]        Pt Name: Roselia Aly  MRN: [id number]  [place] 1995  Date of evaluation: 8/15/21      CHIEF COMPLAINT     Chief Complaint   Patient presents with    Dysuria         HISTORY OF PRESENT ILLNESS  (Location/Symptom, Timing/Onset, Context/Setting, Quality, Duration, Modifying Factors, Severity.)    Roselia Aly is a 26 y.o. female who presents after having one episode of burning with urination and a strong smell to her urine. No abdominal pain. No flank pain. No fever or chills. No nausea or vomiting. No vaginal bleeding or discharge. No new sexual partners. She states that she does not drink much water, and may be she is dehydrated. Her urine has looked a little dark as well. REVIEW OF SYSTEMS    (2-9 systems for level 4, 10 or more for level 5)     Review of Systems   Constitutional: Negative for chills and fever. HENT: Negative for congestion and sore throat. Eyes: Negative for photophobia and visual disturbance. Respiratory: Negative for cough and shortness of breath. Cardiovascular: Negative for chest pain and palpitations. Gastrointestinal: Negative for abdominal pain, constipation, diarrhea, nausea and vomiting. Genitourinary: Positive for dysuria. Negative for frequency, urgency, vaginal bleeding and vaginal discharge. Musculoskeletal: Negative for back pain and neck pain. Skin: Negative for rash and wound. Neurological: Negative for dizziness, light-headedness and headaches. Hematological: Negative for adenopathy. Does not bruise/bleed easily. PAST MEDICAL HISTORY    has a past medical history of Allergic, allergy, Anemia, Pyelonephritis, Pyelonephritis, and Traumatic pneumothorax. SURGICAL HISTORY      has a past surgical history that includes Tympanostomy tube placement and  section.     CURRENTMEDICATIONS       Previous Medications    CEPHALEXIN (KEFLEX) 500 MG CAPSULE    Take 1 capsule by mouth 4 times daily       ALLERGIES     has No Known Allergies. FAMILY HISTORY     She indicated that her mother is alive. She indicated that her father is alive. She indicated that her brother is alive. family history includes COPD in her mother; No Known Problems in her brother and father; Other in her mother. SOCIAL HISTORY      reports that she has never smoked. She has never used smokeless tobacco. She reports that she does not drink alcohol and does not use drugs. PHYSICAL EXAM    (up to 7 for level 4, 8 or more for level 5)   INITIAL VITALS:  oral temperature is 98.2 °F (36.8 °C). Her blood pressure is 116/73 and her pulse is 85. Her respiration is 16 and oxygen saturation is 98%. Physical Exam  Vitals and nursing note reviewed. Constitutional:       Appearance: Normal appearance. Eyes:      Extraocular Movements: Extraocular movements intact. Pupils: Pupils are equal, round, and reactive to light. Cardiovascular:      Rate and Rhythm: Normal rate and regular rhythm. Pulses: Normal pulses. Heart sounds: Normal heart sounds. No murmur heard. No friction rub. No gallop. Pulmonary:      Effort: Pulmonary effort is normal.      Breath sounds: Normal breath sounds. No wheezing, rhonchi or rales. Abdominal:      General: Abdomen is flat. Bowel sounds are normal.      Palpations: Abdomen is soft. Tenderness: There is no abdominal tenderness. There is no right CVA tenderness, left CVA tenderness, guarding or rebound. Musculoskeletal:         General: Normal range of motion. Cervical back: Normal range of motion and neck supple. Right lower leg: No edema. Left lower leg: No edema. Skin:     General: Skin is warm and dry. Capillary Refill: Capillary refill takes less than 2 seconds. Neurological:      Mental Status: She is alert and oriented to person, place, and time. Mental status is at baseline.    Psychiatric: Mood and Affect: Mood normal.         Behavior: Behavior normal.         Thought Content: Thought content normal.         DIFFERENTIAL DIAGNOSIS/ MDM:     50-year-old female here after 1 episode of dysuria, and urine that smells strong. No vaginal bleeding or discharge. No further dysuria following that one episode. Her urinalysis is negative. Her abdomen is completely benign. No CVA tenderness. I discussed with the patient that she should increase her oral intake of fluids. Follow-up with her primary care doctor in 2 days. DIAGNOSTIC RESULTS     EKG: All EKG's are interpreted by the Emergency Department Physician who either signs or Co-signs this chart in the absence of a cardiologist.    none    RADIOLOGY:        Interpretation per the Radiologist below, if available at the time of this note:    none    LABS:  Results for orders placed or performed during the hospital encounter of 08/15/21   Urinalysis Reflex to Culture    Specimen: Urine, clean catch   Result Value Ref Range    Color, UA YELLOW YELLOW    Turbidity UA CLEAR CLEAR    Glucose, Ur NEGATIVE NEGATIVE    Bilirubin Urine NEGATIVE NEGATIVE    Ketones, Urine NEGATIVE NEGATIVE    Specific Gravity, UA 1.020 1.005 - 1.030    Urine Hgb NEGATIVE NEGATIVE    pH, UA 7.0 5.0 - 8.0    Protein, UA NEGATIVE NEGATIVE    Urobilinogen, Urine Normal Normal    Nitrite, Urine NEGATIVE NEGATIVE    Leukocyte Esterase, Urine NEGATIVE NEGATIVE    Urinalysis Comments       Microscopic exam not performed based on chemical results unless requested in original order. Urinalysis Comments          Urinalysis Comments       Utilizing a urinalysis as the only screening method to exclude a potential uropathogen can be unreliable in many patient populations. Rapid screening tests are less sensitive than culture and if UTI is a clinical possibility, culture should be considered despite a negative urinalysis.        UA negative for infection    EMERGENCY DEPARTMENT COURSE:   Vitals:    Vitals:    08/15/21 2217   BP: 116/73   Pulse: 85   Resp: 16   Temp: 98.2 °F (36.8 °C)   TempSrc: Oral   SpO2: 98%     -------------------------  BP: 116/73, Temp: 98.2 °F (36.8 °C), Pulse: 85, Resp: 16          CONSULTS:  none    PROCEDURES:  None    FINAL IMPRESSION      1.  Dysuria          DISPOSITION/PLAN   DISPOSITION Decision To Discharge 08/15/2021 11:18:56 PM      CONDITION ON DISPOSITION:   Stable     PATIENT REFERRED TO:  Elicia Nelson DO  [address] [place] [address].  [phone]    Schedule an appointment as soon as possible for a visit in 2 days        DISCHARGE MEDICATIONS:  New Prescriptions    No medications on file       (Please note that portions of this note were completed with a voicerecognition program.  Efforts were made to edit the dictations but occasionally words are mis-transcribed.)    Renee Vyas MD  Attending Emergency Medicine Physician        Renee Vyas MD  08/15/21 5803       Renee Vyas MD  08/15/21 9021

## 2021-08-16 NOTE — ED NOTES
Mode of arrival (squad #, walk in, police, etc) : walk in        Chief complaint(s): Dysuria        Arrival Note (brief scenario, treatment PTA, etc). : Pt reports onset of pain with urination in the past few days. Pt reports hx of UTIs. Pt is denying vaginal discharge, bleeding, nausea, vomiting or diarrhea. Pt is also denying flank pain. C= \"Have you ever felt that you should Cut down on your drinking? \"  No  A= \"Have people Annoyed you by criticizing your drinking? \"  No  G= \"Have you ever felt bad or Guilty about your drinking? \"  No  E= \"Have you ever had a drink as an Eye-opener first thing in the morning to steady your nerves or to help a hangover? \"  No      Deferred []      Reason for deferring: N/A    *If yes to two or more: probable alcohol abuse. Ra Barton RN  08/15/21 4210

## 2021-09-20 ENCOUNTER — HOSPITAL ENCOUNTER (EMERGENCY)
Age: 26
Discharge: LEFT BEFORE DISCHARGE | End: 2021-09-20
Attending: STUDENT IN AN ORGANIZED HEALTH CARE EDUCATION/TRAINING PROGRAM
Payer: COMMERCIAL

## 2021-09-20 ENCOUNTER — APPOINTMENT (OUTPATIENT)
Dept: GENERAL RADIOLOGY | Age: 26
End: 2021-09-20
Payer: COMMERCIAL

## 2021-09-20 VITALS
HEART RATE: 100 BPM | BODY MASS INDEX: 21.16 KG/M2 | RESPIRATION RATE: 16 BRPM | SYSTOLIC BLOOD PRESSURE: 101 MMHG | TEMPERATURE: 98.1 F | DIASTOLIC BLOOD PRESSURE: 77 MMHG | HEIGHT: 62 IN | OXYGEN SATURATION: 99 % | WEIGHT: 115 LBS

## 2021-09-20 DIAGNOSIS — Z53.21 ELOPED FROM EMERGENCY DEPARTMENT: ICD-10-CM

## 2021-09-20 DIAGNOSIS — S39.012A STRAIN OF LUMBAR REGION, INITIAL ENCOUNTER: Primary | ICD-10-CM

## 2021-09-20 PROCEDURE — 99283 EMERGENCY DEPT VISIT LOW MDM: CPT

## 2021-09-20 RX ORDER — ORPHENADRINE CITRATE 30 MG/ML
60 INJECTION INTRAMUSCULAR; INTRAVENOUS ONCE
Status: DISCONTINUED | OUTPATIENT
Start: 2021-09-20 | End: 2021-09-21 | Stop reason: HOSPADM

## 2021-09-20 RX ORDER — KETOROLAC TROMETHAMINE 30 MG/ML
30 INJECTION, SOLUTION INTRAMUSCULAR; INTRAVENOUS ONCE
Status: DISCONTINUED | OUTPATIENT
Start: 2021-09-20 | End: 2021-09-21 | Stop reason: HOSPADM

## 2021-09-20 ASSESSMENT — PAIN DESCRIPTION - PAIN TYPE: TYPE: ACUTE PAIN

## 2021-09-20 ASSESSMENT — PAIN SCALES - GENERAL: PAINLEVEL_OUTOF10: 8

## 2021-09-20 ASSESSMENT — PAIN DESCRIPTION - LOCATION: LOCATION: BACK

## 2021-09-21 ASSESSMENT — ENCOUNTER SYMPTOMS
COLOR CHANGE: 0
SHORTNESS OF BREATH: 0
EYE REDNESS: 0
ABDOMINAL PAIN: 0
BACK PAIN: 1
EYE DISCHARGE: 0

## 2021-09-21 NOTE — ED NOTES
Writer went into pt's room to get a urine specimen from the pt. Pt's gown was laying on the bed, pt had taken all of belongings, and pt was not in room.       Charo Mcknight RN  09/20/21 7924

## 2021-09-21 NOTE — ED PROVIDER NOTES
[facility]  Emergency Department Encounter     Pt Name: Marixa Julien  MRN: [id number]  [place] 1995  Date of evaluation: 21  PCP:  Farrah Medina, [address] [place] [address]       Chief Complaint   Patient presents with    Back Pain     lower, started yesterday, denies injury       HISTORY OFPRESENT ILLNESS  (Location/Symptom, Timing/Onset, Context/Setting, Quality, Duration, Modifying Horacio Osorio.)      Marixa Julien is a 26 y.o. female who presents with bilateral low back pain   pain is worse with movement and pain is rated as severe. Pain is located over the bilateral flanks and radiates to the buttock. Pain has been constant and worsening. Movement is making pain worse. 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 Denies any dysuria, hematuria, fevers. Denies any bowel or bladder incontinence, saddle anesthesia, IV drug use, fevers. Has been able ambulate however painful. PAST MEDICAL / SURGICAL / SOCIAL / FAMILY HISTORY      has a past medical history of Allergic, allergy, Anemia, Pyelonephritis, Pyelonephritis, and Traumatic pneumothorax.     has a past surgical history that includes Tympanostomy tube placement and  section.     Social History     Socioeconomic History    Marital status: Single     Spouse name: Not on file    Number of children: Not on file    Years of education: Not on file    Highest education level: Not on file   Occupational History    Not on file   Tobacco Use    Smoking status: Never Smoker    Smokeless tobacco: Never Used   Vaping Use    Vaping Use: Never used   Substance and Sexual Activity    Alcohol use: No    Drug use: No    Sexual activity: Not on file   Other Topics Concern    Not on file   Social History Narrative    Not on file     Social Determinants of Health     Financial Resource Strain:     Difficulty of Paying Living Expenses:    Food Insecurity:     Worried About Running Out of Food in the Last Year:    [address] in the Last Year:    Transportation Needs:     Lack of Transportation (Medical):  Lack of Transportation (Non-Medical):    Physical Activity:     Days of Exercise per Week:     Minutes of Exercise per Session:    Stress:     Feeling of Stress :    Social Connections:     Frequency of Communication with Friends and Family:     Frequency of Social Gatherings with Friends and Family:     Attends Yarsanism Services:     Active Member of Clubs or Organizations:     Attends Club or Organization Meetings:     Marital Status:    Intimate Partner Violence:     Fear of Current or Ex-Partner:     Emotionally Abused:     Physically Abused:     Sexually Abused:        Family History   Problem Relation Age of Onset    COPD Mother     Other Mother         Tube insertion x2    No Known Problems Father     No Known Problems Brother        Allergies:  Patient has no known allergies. Home Medications:  Prior to Admission medications    Medication Sig Start Date End Date Taking? Authorizing Provider   cephALEXin (KEFLEX) 500 MG capsule Take 1 capsule by mouth 4 times daily 7/4/21   Shant Pope MD       REVIEW OF SYSTEMS    (2-9 systems for level 4, 10 or more for level 5)      Review of Systems   Constitutional: Negative for chills and fever. Eyes: Negative for discharge and redness. Respiratory: Negative for shortness of breath. Cardiovascular: Negative for chest pain. Gastrointestinal: Negative for abdominal pain. Genitourinary: Negative for flank pain. Musculoskeletal: Positive for back pain. Skin: Negative for color change and rash. Allergic/Immunologic: Negative for environmental allergies. Neurological: Negative for headaches. Psychiatric/Behavioral: Negative for agitation and confusion.        PHYSICAL EXAM   (up to 7 for level 4, 8 or more for level 5)     INITIAL VITALS:    height is 5' 2\" (1.575 m) and weight is 115 lb (52.2 kg). Her oral temperature is 98.1 °F (36.7 °C). Her blood pressure is 101/77 and her pulse is 100. Her respiration is 16 and oxygen saturation is 99%. Physical Exam  Vitals and nursing note reviewed. Constitutional:       Appearance: She is well-developed. HENT:      Head: Normocephalic and atraumatic. Eyes:      General: No scleral icterus. Conjunctiva/sclera: Conjunctivae normal.      Pupils: Pupils are equal, round, and reactive to light. Cardiovascular:      Rate and Rhythm: Normal rate and regular rhythm. Heart sounds: Normal heart sounds. No murmur heard. No friction rub. No gallop. Pulmonary:      Effort: Pulmonary effort is normal. No respiratory distress. Breath sounds: Normal breath sounds. No wheezing or rales. Musculoskeletal:         General: Normal range of motion. Comments: Tender palpation across bilateral and midline lumbar and thoracic spine. Skin:     General: Skin is warm and dry. Findings: No erythema or rash. Neurological:      Mental Status: She is alert and oriented to person, place, and time. Comments: 5/5 strength in bilateral lower extremities, DTRs 2/4, no change in sensation, pulses 2/4   Psychiatric:         Behavior: Behavior normal.         DIFFERENTIAL  DIAGNOSIS     PLAN (LABS / IMAGING / EKG):  Orders Placed This Encounter   Procedures    POCT urine pregnancy       MEDICATIONS ORDERED:  Orders Placed This Encounter   Medications    DISCONTD: ketorolac (TORADOL) injection 30 mg    DISCONTD: orphenadrine (NORFLEX) injection 60 mg       DDX: Strain versus sprain versus cauda equina versus epidural abscess    Initial MDM/Plan: 26 y.o. female who presents with bilateral back pain no clear trauma. Will get urinalysis, treat with Toradol Norflex, possible imaging. DIAGNOSTIC RESULTS / EMERGENCY DEPARTMENT COURSE / MDM     LABS:  Labs Reviewed   POCT URINE PREGNANCY         RADIOLOGY:  No results found.       EMERGENCY DEPARTMENT COURSE:  Patient eloped prior to completion of any work-up. PROCEDURES:  None    CONSULTS:  None    CRITICAL CARE:  0    FINAL IMPRESSION      1. Strain of lumbar region, initial encounter    2. Eloped from emergency department          DISPOSITION / [address] - Left Before Treatment Complete 09/20/2021 10:11:56 PM        PATIENTREFERRED TO:  No follow-up provider specified.     DISCHARGE MEDICATIONS:  Discharge Medication List as of 9/20/2021 10:29 PM          Lori Haji DO  EmergencyMedicine Attending    (Please note that portions of this note were completed with a voice recognition program.  Efforts were made to edit the dictations but occasionally words are mis-transcribed.)       Lori Haji DO  09/21/21 1141

## 2021-10-18 ENCOUNTER — TELEPHONE (OUTPATIENT)
Dept: FAMILY MEDICINE CLINIC | Age: 26
End: 2021-10-18

## 2021-12-04 ENCOUNTER — HOSPITAL ENCOUNTER (EMERGENCY)
Age: 26
Discharge: HOME | End: 2021-12-04
Attending: EMERGENCY MEDICINE
Payer: COMMERCIAL

## 2021-12-04 VITALS
TEMPERATURE: 97.5 F | OXYGEN SATURATION: 95 % | DIASTOLIC BLOOD PRESSURE: 88 MMHG | HEART RATE: 82 BPM | SYSTOLIC BLOOD PRESSURE: 119 MMHG

## 2021-12-04 DIAGNOSIS — N30.00 ACUTE CYSTITIS WITHOUT HEMATURIA: Primary | ICD-10-CM

## 2021-12-04 LAB
-: ABNORMAL
AMORPHOUS: ABNORMAL
BACTERIA: ABNORMAL
BILIRUBIN URINE: NEGATIVE
CASTS UA: ABNORMAL /LPF
COLOR: YELLOW
COMMENT UA: ABNORMAL
CRYSTALS, UA: ABNORMAL /HPF
EPITHELIAL CELLS UA: ABNORMAL /HPF (ref 0–5)
GLUCOSE URINE: NEGATIVE
HCG(URINE) PREGNANCY TEST: NEGATIVE
KETONES, URINE: NEGATIVE
LEUKOCYTE ESTERASE, URINE: ABNORMAL
MUCUS: ABNORMAL
NITRITE, URINE: NEGATIVE
OTHER OBSERVATIONS UA: ABNORMAL
PH UA: 6 (ref 5–8)
PROTEIN UA: ABNORMAL
RBC UA: ABNORMAL /HPF (ref 0–2)
RENAL EPITHELIAL, UA: ABNORMAL /HPF
SPECIFIC GRAVITY UA: 1.03 (ref 1–1.03)
TRICHOMONAS: ABNORMAL
TURBIDITY: ABNORMAL
URINE HGB: NEGATIVE
UROBILINOGEN, URINE: NORMAL
WBC UA: ABNORMAL /HPF (ref 0–5)
YEAST: ABNORMAL

## 2021-12-04 PROCEDURE — 81025 URINE PREGNANCY TEST: CPT

## 2021-12-04 PROCEDURE — 96372 THER/PROPH/DIAG INJ SC/IM: CPT

## 2021-12-04 PROCEDURE — 6360000002 HC RX W HCPCS: Performed by: EMERGENCY MEDICINE

## 2021-12-04 PROCEDURE — 87591 N.GONORRHOEAE DNA AMP PROB: CPT

## 2021-12-04 PROCEDURE — 81001 URINALYSIS AUTO W/SCOPE: CPT

## 2021-12-04 PROCEDURE — 87086 URINE CULTURE/COLONY COUNT: CPT

## 2021-12-04 PROCEDURE — 99283 EMERGENCY DEPT VISIT LOW MDM: CPT

## 2021-12-04 PROCEDURE — 6370000000 HC RX 637 (ALT 250 FOR IP): Performed by: EMERGENCY MEDICINE

## 2021-12-04 PROCEDURE — 87491 CHLMYD TRACH DNA AMP PROBE: CPT

## 2021-12-04 RX ORDER — CEFTRIAXONE SODIUM 250 MG/1
250 INJECTION, POWDER, FOR SOLUTION INTRAMUSCULAR; INTRAVENOUS ONCE
Status: COMPLETED | OUTPATIENT
Start: 2021-12-04 | End: 2021-12-04

## 2021-12-04 RX ORDER — AZITHROMYCIN 250 MG/1
1000 TABLET, FILM COATED ORAL ONCE
Status: COMPLETED | OUTPATIENT
Start: 2021-12-04 | End: 2021-12-04

## 2021-12-04 RX ORDER — CEPHALEXIN 500 MG/1
500 CAPSULE ORAL 2 TIMES DAILY
Qty: 14 CAPSULE | Refills: 0 | Status: SHIPPED | OUTPATIENT
Start: 2021-12-04 | End: 2021-12-11

## 2021-12-04 RX ADMIN — AZITHROMYCIN MONOHYDRATE 1000 MG: 250 TABLET ORAL at 00:48

## 2021-12-04 RX ADMIN — CEFTRIAXONE SODIUM 250 MG: 250 INJECTION, POWDER, FOR SOLUTION INTRAMUSCULAR; INTRAVENOUS at 00:48

## 2021-12-04 NOTE — ED PROVIDER NOTES
EMERGENCY DEPARTMENT ENCOUNTER    Pt Name: Milton Alicea  MRN: [id number]  [place] 1995  Date of evaluation: 21  CHIEF COMPLAINT       Chief Complaint   Patient presents with    Urinary Frequency    Exposure to STD     possibly; with room 21     HISTORY OF PRESENT ILLNESS   Patient is a 67-year-old female who presents to the ED complaining of dysuria x4 days. Patient has also had unprotected sex with partner who is also in the ED being treated for STIs. She does not report vaginal discharge, vaginal bleeding. She was taking Azo to help relieve her symptoms with moderate effect. No other issues at this time. ROS:  No fevers, cough, shortness of breath, chest pain, abdominal pain, nausea, vomiting, changes in stool. REVIEW OF SYSTEMS     Review of Systems   All other systems reviewed and are negative. PASTMEDICAL HISTORY     Past Medical History:   Diagnosis Date    Allergic     codeine- anaphylaxis    allergy     codeine- anaphylaxis    Anemia 11/3/2019    Pyelonephritis 3/14/2015    Pyelonephritis     Traumatic pneumothorax      SURGICAL HISTORY       Past Surgical History:   Procedure Laterality Date     SECTION      TYMPANOSTOMY TUBE PLACEMENT       CURRENT MEDICATIONS       Previous Medications    No medications on file     ALLERGIES     has No Known Allergies. FAMILY HISTORY     She indicated that her mother is alive. She indicated that her father is alive. She indicated that her brother is alive. SOCIAL HISTORY       Social History     Tobacco Use    Smoking status: Never Smoker    Smokeless tobacco: Never Used   Vaping Use    Vaping Use: Never used   Substance Use Topics    Alcohol use: No    Drug use: No     PHYSICAL EXAM     INITIAL VITALS: /88   Pulse 82   Temp 97.5 °F (36.4 °C) (Oral)   LMP 2021   SpO2 95%    Physical Exam  HENT:      Head: Normocephalic.       Right Ear: External ear normal.      Left Ear: External ear normal. Nose: Nose normal.   Eyes:      Conjunctiva/sclera: Conjunctivae normal.   Cardiovascular:      Rate and Rhythm: Normal rate. Pulmonary:      Effort: Pulmonary effort is normal.   Abdominal:      General: Abdomen is flat. Skin:     General: Skin is dry. Neurological:      Mental Status: She is alert. Mental status is at baseline. Psychiatric:         Mood and Affect: Mood normal.         Behavior: Behavior normal.         MEDICAL DECISION MAKING:   The patient is hemodynamically stable, afebrile, nontoxic-appearing. Physical exam unremarkable. Based on history and exam likely cystitis. Will prophylactically treat for chlamydia, gonorrhea. ED plan for UA, azithromycin 1 g p.o., Rocephin 250 mg IM. DIAGNOSTIC RESULTS   EKG:All EKG's are interpreted by the Emergency Department Physician who either signs or Co-signs this chart in the absence of a cardiologist.        RADIOLOGY:All plain film, CT, MRI, and formal ultrasound images (except ED bedside ultrasound) are read by the radiologist, see reports below, unless otherwisenoted in MDM or here. No orders to display     LABS: All lab results were reviewed by myself, and all abnormals are listed below. Labs Reviewed   URINE RT REFLEX TO CULTURE - Abnormal; Notable for the following components:       Result Value    Turbidity UA Cloudy (*)     Specific Gravity, UA 1.035 (*)     Protein, UA 1+ (*)     Leukocyte Esterase, Urine SMALL (*)     All other components within normal limits   MICROSCOPIC URINALYSIS - Abnormal; Notable for the following components:    Bacteria, UA MANY (*)     Amorphous, UA 1+ (*)     All other components within normal limits   C.TRACHOMATIS N.GONORRHOEAE DNA, URINE   CULTURE, URINE   PREGNANCY, URINE       EMERGENCY DEPARTMENTCOURSE:   Patient did well in the ED. Given azithromycin and ceftriaxone in the ED. UA with bacteria. Given Rx for Keflex. No further work-up indicated at this time. Nursing notes reviewed.   At this time this is what I find, the patient appears well and does not appear sick or toxic. I gave my usual and customary discussion of the risks and benefits of discharge versus admission. I answered the patient's questions. I gave the patient strict return precautions. Patient expressed understanding of the discharge instructions. The care is provided during an unprecedented national emergency due to the novel coronavirus, COVID-19. Dictated but not reviewed. Vitals:    Vitals:    12/04/21 0020   BP: 119/88   Pulse: 82   Temp: 97.5 °F (36.4 °C)   TempSrc: Oral   SpO2: 95%       The patient was given the following medications while in the emergency department:  Orders Placed This Encounter   Medications    azithromycin (ZITHROMAX) tablet 1,000 mg     Order Specific Question:   Antimicrobial Indications     Answer:   Aspiration Pneumonia    cefTRIAXone (ROCEPHIN) injection 250 mg     Order Specific Question:   Antimicrobial Indications     Answer:   Aspiration Pneumonia    cephALEXin (KEFLEX) 500 MG capsule     Sig: Take 1 capsule by mouth 2 times daily for 7 days     Dispense:  14 capsule     Refill:  0     CONSULTS:  None    FINAL IMPRESSION      1. Acute cystitis without hematuria          DISPOSITION/PLAN   DISPOSITION        PATIENT REFERRED TO:  No follow-up provider specified.   DISCHARGE MEDICATIONS:  New Prescriptions    CEPHALEXIN (KEFLEX) 500 MG CAPSULE    Take 1 capsule by mouth 2 times daily for 7 days     Selvin Mahan MD  Attending Emergency Physician                    Claudetta Canter, MD  12/04/21 2035

## 2021-12-05 LAB
CULTURE: NORMAL
Lab: NORMAL
SPECIMEN DESCRIPTION: NORMAL

## 2021-12-06 LAB
C. TRACHOMATIS DNA ,URINE: ABNORMAL
N. GONORRHOEAE DNA, URINE: NEGATIVE
SPECIMEN DESCRIPTION: ABNORMAL

## 2021-12-16 ENCOUNTER — TELEPHONE (OUTPATIENT)
Dept: OBGYN CLINIC | Facility: CLINIC | Age: 26
End: 2021-12-16

## 2021-12-16 DIAGNOSIS — R39.82 CHRONIC URINARY BLADDER PAIN: Primary | ICD-10-CM

## 2022-01-03 ENCOUNTER — TELEPHONE (OUTPATIENT)
Dept: OBGYN CLINIC | Facility: CLINIC | Age: 27
End: 2022-01-03

## 2022-01-03 DIAGNOSIS — O21.9 NAUSEA AND VOMITING DURING PREGNANCY: Primary | ICD-10-CM

## 2022-01-03 RX ORDER — ONDANSETRON 4 MG/1
4 TABLET, ORALLY DISINTEGRATING ORAL EVERY 6 HOURS PRN
Qty: 20 TABLET | Refills: 2 | Status: SHIPPED | OUTPATIENT
Start: 2022-01-03

## 2022-01-03 NOTE — TELEPHONE ENCOUNTER
Patient called currently pregnant, has appointment with Dr Behzad Mahajan to confirm pregnancy  Patient complaining of morning sickness  Would like to know if RX can be sent to pharmacy?

## 2022-01-13 ENCOUNTER — TELEPHONE (OUTPATIENT)
Dept: OBGYN CLINIC | Facility: CLINIC | Age: 27
End: 2022-01-13

## 2022-01-13 NOTE — TELEPHONE ENCOUNTER
Pt states she started a process of , took 1 of the pills given by  clinic, in the meanwhile she changed her mind and went to another clinic where she was placed on progesterone, pt had an u/s done today, fetal heartbeat still present, pt wants to know if she is to continue pregnancy what are the chances of baby having an abnormality, please call patient, thanks

## 2022-01-13 NOTE — TELEPHONE ENCOUNTER
Spoke to the patient explained risk of  medication in pregnancy and fetal anomaly will consider details ultrasound as well as notify MFM at the time of the ultrasound if desire to continue with the pregnancy in the meanwhile to continue prenatal vitamin daily and return to office at her next schedule appointment

## 2022-01-28 ENCOUNTER — HOSPITAL ENCOUNTER (EMERGENCY)
Age: 27
Discharge: HOME | End: 2022-01-28
Attending: EMERGENCY MEDICINE
Payer: COMMERCIAL

## 2022-01-28 VITALS
RESPIRATION RATE: 18 BRPM | SYSTOLIC BLOOD PRESSURE: 113 MMHG | WEIGHT: 115 LBS | HEART RATE: 78 BPM | DIASTOLIC BLOOD PRESSURE: 65 MMHG | HEIGHT: 62 IN | BODY MASS INDEX: 21.16 KG/M2 | TEMPERATURE: 98.4 F | OXYGEN SATURATION: 100 %

## 2022-01-28 DIAGNOSIS — J06.9 VIRAL URI WITH COUGH: Primary | ICD-10-CM

## 2022-01-28 PROCEDURE — 99283 EMERGENCY DEPT VISIT LOW MDM: CPT

## 2022-01-28 PROCEDURE — U0005 INFEC AGEN DETEC AMPLI PROBE: HCPCS

## 2022-01-28 PROCEDURE — U0003 INFECTIOUS AGENT DETECTION BY NUCLEIC ACID (DNA OR RNA); SEVERE ACUTE RESPIRATORY SYNDROME CORONAVIRUS 2 (SARS-COV-2) (CORONAVIRUS DISEASE [COVID-19]), AMPLIFIED PROBE TECHNIQUE, MAKING USE OF HIGH THROUGHPUT TECHNOLOGIES AS DESCRIBED BY CMS-2020-01-R: HCPCS

## 2022-01-28 ASSESSMENT — PAIN SCALES - GENERAL: PAINLEVEL_OUTOF10: 8

## 2022-01-28 ASSESSMENT — PAIN DESCRIPTION - LOCATION: LOCATION: HEAD

## 2022-01-28 ASSESSMENT — PAIN DESCRIPTION - PAIN TYPE: TYPE: ACUTE PAIN

## 2022-01-28 NOTE — Clinical Note
Bel Montiel was seen and treated in our emergency department on 1/28/2022. She may return to work on 01/30/2022. If you have any questions or concerns, please don't hesitate to call.       Jose Gunter MD

## 2022-01-28 NOTE — ED NOTES
Pt presents to the er c/o nasal congestion a cough body aches and a severe headache for the past two days      Jaiden Cuevas RN  01/28/22 0106

## 2022-01-28 NOTE — ED PROVIDER NOTES
EMERGENCY DEPARTMENT ENCOUNTER    Pt Name: Jennifer Wu  MRN: [id number]  [place] 1995  Date of evaluation: 22  CHIEF COMPLAINT       Chief Complaint   Patient presents with    Cough    Headache     HISTORY OF PRESENT ILLNESS   Patient is a 27-year-old female who presents to the ED for evaluation of nasal congestion, runny nose, cough and joint pain. Symptoms started 2 days ago. At that time she tested negative for Covid with a rapid test.  No other issues at this time. ROS:  No shortness of breath, chest pain, abdominal pain, nausea, vomiting, changes in urine or stool. REVIEW OF SYSTEMS     Review of Systems   All other systems reviewed and are negative. PASTMEDICAL HISTORY     Past Medical History:   Diagnosis Date    Allergic     codeine- anaphylaxis    allergy     codeine- anaphylaxis    Anemia 11/3/2019    Pyelonephritis 3/14/2015    Pyelonephritis     Traumatic pneumothorax      SURGICAL HISTORY       Past Surgical History:   Procedure Laterality Date     SECTION      TYMPANOSTOMY TUBE PLACEMENT       CURRENT MEDICATIONS       Previous Medications    No medications on file     ALLERGIES     has No Known Allergies. FAMILY HISTORY     She indicated that her mother is alive. She indicated that her father is alive. She indicated that her brother is alive. SOCIAL HISTORY       Social History     Tobacco Use    Smoking status: Never Smoker    Smokeless tobacco: Never Used   Vaping Use    Vaping Use: Never used   Substance Use Topics    Alcohol use: No    Drug use: No     PHYSICAL EXAM     INITIAL VITALS: /65   Pulse 78   Temp 98.4 °F (36.9 °C) (Oral)   Resp 18   Ht 5' 2\" (1.575 m)   Wt 115 lb (52.2 kg)   LMP 2022   SpO2 100%   BMI 21.03 kg/m²    Physical Exam  HENT:      Head: Normocephalic. Right Ear: External ear normal.      Left Ear: External ear normal.      Nose: Congestion and rhinorrhea present.    Eyes:      Conjunctiva/sclera: Conjunctivae normal.   Cardiovascular:      Rate and Rhythm: Normal rate. Pulmonary:      Effort: Pulmonary effort is normal.   Abdominal:      General: Abdomen is flat. Skin:     General: Skin is dry. Neurological:      Mental Status: She is alert. Mental status is at baseline. Psychiatric:         Mood and Affect: Mood normal.         Behavior: Behavior normal.         MEDICAL DECISION MAKING:   The patient is hemodynamically stable, afebrile, nontoxic-appearing. Physical exam notable for runny nose, nasal congestion. Based on history and exam likely viral URI, Covid-19. ED plan for nonrapid Covid, discharge. DIAGNOSTIC RESULTS   EKG:All EKG's are interpreted by the Emergency Department Physician who either signs or Co-signs this chart in the absence of a cardiologist.        RADIOLOGY:All plain film, CT, MRI, and formal ultrasound images (except ED bedside ultrasound) are read by the radiologist, see reports below, unless otherwisenoted in MDM or here. No orders to display     LABS: All lab results were reviewed by myself, and all abnormals are listed below. Labs Reviewed   COVID-19       EMERGENCY DEPARTMENTCOURSE:   Patient did well in the ED. Nonrapid Covid still pending. Advised to follow results in my chart. No further work-up indicated at this time. Nursing notes reviewed. At this time this is what I find, the patient appears well and does not appear sick or toxic. I gave my usual and customary discussion of the risks and benefits of discharge versus admission. I answered the patient's questions. I gave the patient strict return precautions. Patient expressed understanding of the discharge instructions. The care is provided during an unprecedented national emergency due to the novel coronavirus, COVID-19. Dictated but not reviewed.       Vitals:    Vitals:    01/28/22 0056   BP: 113/65   Pulse: 78   Resp: 18   Temp: 98.4 °F (36.9 °C)   TempSrc: Oral   SpO2: 100% Weight: 115 lb (52.2 kg)   Height: 5' 2\" (1.575 m)       The patient was given the following medications while in the emergency department:  No orders of the defined types were placed in this encounter. CONSULTS:  None    FINAL IMPRESSION      1.  Viral URI with cough          DISPOSITION/PLAN   DISPOSITION Decision To Discharge 01/28/2022 01:14:56 AM      PATIENT REFERRED TO:  Shayla Alexandre DO  [address] [place] [address] 22.  [phone]    In 2 days      DISCHARGE MEDICATIONS:  New Prescriptions    No medications on file     Rodolfo Meade MD  Attending Emergency Physician                    Miranda Parmar MD  01/28/22 0559

## 2022-01-29 LAB
SARS-COV-2: NORMAL
SARS-COV-2: NOT DETECTED
SOURCE: NORMAL

## 2022-02-28 DIAGNOSIS — A49.9 BACTERIAL INFECTION: Primary | ICD-10-CM

## 2022-02-28 NOTE — TELEPHONE ENCOUNTER
Patient followed through with termination last month  Complaining vaginal odor, would like RX for BV

## 2022-03-01 RX ORDER — METRONIDAZOLE 500 MG/1
500 TABLET ORAL EVERY 12 HOURS SCHEDULED
Qty: 14 TABLET | Refills: 0 | Status: SHIPPED | OUTPATIENT
Start: 2022-03-01 | End: 2022-03-08

## 2022-03-14 ENCOUNTER — TELEPHONE (OUTPATIENT)
Dept: OBGYN CLINIC | Facility: CLINIC | Age: 27
End: 2022-03-14

## 2022-03-15 DIAGNOSIS — Z30.41 ENCOUNTER FOR SURVEILLANCE OF CONTRACEPTIVE PILLS: Primary | ICD-10-CM

## 2022-03-15 RX ORDER — LEVONORGESTREL AND ETHINYL ESTRADIOL 0.1-0.02MG
1 KIT ORAL DAILY
Qty: 84 TABLET | Refills: 3 | Status: SHIPPED | OUTPATIENT
Start: 2022-03-15 | End: 2022-04-25 | Stop reason: SDUPTHER

## 2022-03-28 ENCOUNTER — TELEPHONE (OUTPATIENT)
Dept: OTHER | Facility: OTHER | Age: 27
End: 2022-03-28

## 2022-03-28 ENCOUNTER — APPOINTMENT (OUTPATIENT)
Dept: LAB | Age: 27
End: 2022-03-28
Payer: COMMERCIAL

## 2022-03-28 DIAGNOSIS — N91.2 AMENORRHEA: ICD-10-CM

## 2022-03-28 LAB
B-HCG SERPL-ACNC: 28 MIU/ML
PROGEST SERPL-MCNC: 1.3 NG/ML

## 2022-03-28 PROCEDURE — 84702 CHORIONIC GONADOTROPIN TEST: CPT

## 2022-03-28 PROCEDURE — 36415 COLL VENOUS BLD VENIPUNCTURE: CPT

## 2022-03-28 PROCEDURE — 84144 ASSAY OF PROGESTERONE: CPT

## 2022-03-28 NOTE — TELEPHONE ENCOUNTER
Please call the patient in regard to what the values mean in her Progesterone lab work that was done today

## 2022-03-29 DIAGNOSIS — N91.2 AMENORRHEA: Primary | ICD-10-CM

## 2022-03-29 NOTE — PROGRESS NOTES
Patient called for HCG/progesterone results  Advised patient to go for second HCG quant to monitor levels to make sure levels are rising

## 2022-04-25 ENCOUNTER — OFFICE VISIT (OUTPATIENT)
Dept: OBGYN CLINIC | Facility: CLINIC | Age: 27
End: 2022-04-25
Payer: COMMERCIAL

## 2022-04-25 VITALS
WEIGHT: 137.6 LBS | SYSTOLIC BLOOD PRESSURE: 110 MMHG | HEIGHT: 61 IN | BODY MASS INDEX: 25.98 KG/M2 | DIASTOLIC BLOOD PRESSURE: 68 MMHG

## 2022-04-25 DIAGNOSIS — Z30.9 ENCOUNTER FOR CONTRACEPTIVE MANAGEMENT, UNSPECIFIED TYPE: ICD-10-CM

## 2022-04-25 DIAGNOSIS — O03.9 SAB (SPONTANEOUS ABORTION): Primary | ICD-10-CM

## 2022-04-25 PROCEDURE — 99214 OFFICE O/P EST MOD 30 MIN: CPT | Performed by: PHYSICIAN ASSISTANT

## 2022-04-25 RX ORDER — LEVONORGESTREL AND ETHINYL ESTRADIOL 0.1-0.02MG
1 KIT ORAL DAILY
Qty: 84 TABLET | Refills: 0 | Status: SHIPPED | OUTPATIENT
Start: 2022-04-25 | End: 2022-07-18

## 2022-04-25 NOTE — PATIENT INSTRUCTIONS
Go for blood work and pelvic U/S  Rx for OCP resent to pharmacy  Start pill today  Take medication every day at the same time; do not skip doses  Practice consistent condom use for STD prevention  Call with problems

## 2022-04-25 NOTE — PROGRESS NOTES
Assessment/Plan:    No problem-specific Assessment & Plan notes found for this encounter  Diagnoses and all orders for this visit:    SAB (spontaneous )  -     hCG, quantitative; Future  -     US pelvis complete w transvaginal; Future    Encounter for contraceptive management, unspecified type  -     levonorgestrel-ethinyl estradiol [place]) 0 1-20 MG-MCG per tablet; Take 1 tablet by mouth daily        Orders for f/u beta HCG and pelvic U/S entered  We will call with results  Rx for OCP resent to patient's preferred pharmacy  Can start pill today  Stressed the need to take medication every day at the same time and to not skip doses  Practice consistent condom use for STD prevention  F/u in 3 months for yearly gyn exam and birth control check  Call with problems in the interim  Subjective:      Patient ID: Mere Maldonado is a 23 y o  female  Patient is here for f/u of SAB  Had a TAB in January and did not get her period back afterwards  Beta HCG on 3/28 was 28, and patient was having bleeding with clotting  This stopped after a few days  Did not have any imaging performed or f/u beta HCG  States her current period started on ; today is the last day  Patient would like to start OCP as prescribed in March  Denies bowel/bladder changes, pelvic pain, abdominal pain, n/v, and fever/chills  The following portions of the patient's history were reviewed and updated as appropriate: allergies, current medications, past family history, past medical history, past social history, past surgical history and problem list     Review of Systems   Constitutional: Negative for chills and fever  Gastrointestinal: Negative for abdominal distention, abdominal pain, constipation, diarrhea, nausea and vomiting     Genitourinary: Negative for difficulty urinating, dysuria, frequency, genital sores, hematuria, menstrual problem, pelvic pain, urgency, vaginal bleeding, vaginal discharge and vaginal pain  Objective:      /68 (BP Location: Left arm, Patient Position: Sitting, Cuff Size: Standard)   Ht 5' 1" (1 549 m)   Wt 62 4 kg (137 lb 9 6 oz)   LMP 04/20/2022   BMI 26 00 kg/m²          Physical Exam  Vitals reviewed  Constitutional:       Appearance: Normal appearance  She is well-developed  Neurological:      Mental Status: She is alert and oriented to person, place, and time  Psychiatric:         Mood and Affect: Mood normal          Behavior: Behavior normal  Behavior is cooperative  Thought Content:  Thought content normal          Judgment: Judgment normal

## 2022-05-24 ENCOUNTER — VBI (OUTPATIENT)
Dept: ADMINISTRATIVE | Facility: OTHER | Age: 27
End: 2022-05-24

## 2022-07-18 DIAGNOSIS — Z30.9 ENCOUNTER FOR CONTRACEPTIVE MANAGEMENT, UNSPECIFIED TYPE: ICD-10-CM

## 2022-07-18 RX ORDER — LEVONORGESTREL AND ETHINYL ESTRADIOL 0.1-0.02MG
KIT ORAL
Qty: 84 TABLET | Refills: 0 | Status: SHIPPED | OUTPATIENT
Start: 2022-07-18 | End: 2022-07-29 | Stop reason: SDUPTHER

## 2022-07-20 DIAGNOSIS — B37.9 YEAST INFECTION: Primary | ICD-10-CM

## 2022-07-20 RX ORDER — FLUCONAZOLE 150 MG/1
150 TABLET ORAL ONCE
COMMUNITY
End: 2022-07-20 | Stop reason: SDUPTHER

## 2022-07-20 RX ORDER — FLUCONAZOLE 150 MG/1
150 TABLET ORAL ONCE
Qty: 2 TABLET | Refills: 0 | Status: SHIPPED | OUTPATIENT
Start: 2022-07-20 | End: 2022-07-20

## 2022-07-21 NOTE — TELEPHONE ENCOUNTER
Please resend diflucan to the pharmacy, it looks like it was cancelled -  Pharmacy did not received it

## 2022-07-29 ENCOUNTER — ANNUAL EXAM (OUTPATIENT)
Dept: OBGYN CLINIC | Facility: CLINIC | Age: 27
End: 2022-07-29
Payer: COMMERCIAL

## 2022-07-29 VITALS — WEIGHT: 149.8 LBS | HEIGHT: 61 IN | BODY MASS INDEX: 28.28 KG/M2

## 2022-07-29 DIAGNOSIS — Z11.3 SCREENING FOR STD (SEXUALLY TRANSMITTED DISEASE): ICD-10-CM

## 2022-07-29 DIAGNOSIS — Z30.9 ENCOUNTER FOR CONTRACEPTIVE MANAGEMENT, UNSPECIFIED TYPE: ICD-10-CM

## 2022-07-29 DIAGNOSIS — Z01.411 ENCOUNTER FOR GYNECOLOGICAL EXAMINATION WITH ABNORMAL FINDING: Primary | ICD-10-CM

## 2022-07-29 DIAGNOSIS — N92.6 MISSED PERIOD: ICD-10-CM

## 2022-07-29 LAB — SL AMB POCT URINE HCG: NORMAL

## 2022-07-29 PROCEDURE — 0503F POSTPARTUM CARE VISIT: CPT | Performed by: PHYSICIAN ASSISTANT

## 2022-07-29 PROCEDURE — 99395 PREV VISIT EST AGE 18-39: CPT | Performed by: PHYSICIAN ASSISTANT

## 2022-07-29 PROCEDURE — 87591 N.GONORRHOEAE DNA AMP PROB: CPT | Performed by: PHYSICIAN ASSISTANT

## 2022-07-29 PROCEDURE — G0145 SCR C/V CYTO,THINLAYER,RESCR: HCPCS | Performed by: PHYSICIAN ASSISTANT

## 2022-07-29 PROCEDURE — 81025 URINE PREGNANCY TEST: CPT | Performed by: PHYSICIAN ASSISTANT

## 2022-07-29 PROCEDURE — 87491 CHLMYD TRACH DNA AMP PROBE: CPT | Performed by: PHYSICIAN ASSISTANT

## 2022-07-29 RX ORDER — LEVONORGESTREL AND ETHINYL ESTRADIOL 0.1-0.02MG
1 KIT ORAL DAILY
Qty: 84 TABLET | Refills: 1 | Status: SHIPPED | OUTPATIENT
Start: 2022-07-29 | End: 2022-10-12 | Stop reason: SDUPTHER

## 2022-07-29 NOTE — PATIENT INSTRUCTIONS
Refills of OCP sent to pharmacy  Start new pack first day of next period  Take pill every day at the same time; do not skip doses  Practice consistent condom use for STD prevention  F/u with PCP for bleeding with bowel movements  Call with problems

## 2022-07-29 NOTE — PROGRESS NOTES
Assessment/Plan:    No problem-specific Assessment & Plan notes found for this encounter  Diagnoses and all orders for this visit:    Encounter for gynecological examination with abnormal finding  -     Liquid-based pap, screening    Screening for STD (sexually transmitted disease)  -     Chlamydia/GC amplified DNA by PCR    Missed period  -     POCT urine HCG    Encounter for contraceptive management, unspecified type  -     levonorgestrel-ethinyl estradiol Stalin Moulding) 0 1-20 MG-MCG per tablet; Take 1 tablet by mouth daily        UPT in office negative  Pap and GC/chlamydia screening done  We will call with STD testing results  F/u with PCP for bleeding with bowel movements  Refills of OCP sent to pharmacy  Patient can start new pack first day of next period  Stressed the need to take medication every day at the same time and to not skip doses  Practice consistent condom use for STD prevention  F/u in 6 months for birth control check  Will switch to another form if she is having difficulty taking OCP consistently  Call with problems in the interim  Subjective:      Patient ID: Sumaya Peacock is a 23 y o  female  Patient is here for yearly gyn exam   States she is doing well overall  Started OCP about a month ago; states she is not taking it correctly  Has missed doses or taken some late  Would like to restart  Period in June was normal   Patient has had unprotected intercourse with a new partner  Requests pregnancy test   Complains of bleeding with bowel movements  Denies bladder changes, pelvic pain, bloating, abdominal pain, n/v, change in appetite, and thyroid disease  Patient is performing self-breast exam   Denies new masses, skin changes, nipple discharge, and pain/tenderness        The following portions of the patient's history were reviewed and updated as appropriate: allergies, current medications, past family history, past medical history, past social history, past surgical history and problem list     Review of Systems   Constitutional: Negative for appetite change and unexpected weight change  Cardiovascular:        No masses, skin changes, nipple discharge, and pain/tenderness  Gastrointestinal: Positive for blood in stool  Negative for abdominal distention, abdominal pain, constipation, diarrhea, nausea and vomiting  Genitourinary: Negative for difficulty urinating, dysuria, frequency, genital sores, hematuria, menstrual problem, pelvic pain, urgency, vaginal bleeding, vaginal discharge and vaginal pain  Objective:      Ht 5' 1" (1 549 m)   Wt 67 9 kg (149 lb 12 8 oz)   LMP 07/08/2022 (Approximate)   BMI 28 30 kg/m²          Physical Exam  Vitals reviewed  Exam conducted with a chaperone present  Constitutional:       Appearance: Normal appearance  She is well-developed  Neck:      Thyroid: No thyromegaly  Pulmonary:      Effort: Pulmonary effort is normal    Chest:   Breasts: Breasts are symmetrical       Right: Normal  No swelling, bleeding, inverted nipple, mass, nipple discharge, skin change, tenderness, axillary adenopathy or supraclavicular adenopathy  Left: Normal  No swelling, bleeding, inverted nipple, mass, nipple discharge, skin change, tenderness, axillary adenopathy or supraclavicular adenopathy  Abdominal:      General: Abdomen is flat  There is no distension  Palpations: Abdomen is soft  Tenderness: There is no abdominal tenderness  Genitourinary:     General: Normal vulva  Pubic Area: No rash  Labia:         Right: No rash, tenderness, lesion or injury  Left: No rash, tenderness, lesion or injury  Vagina: Normal  No vaginal discharge, erythema, tenderness or bleeding  Cervix: Normal       Uterus: Normal        Adnexa: Right adnexa normal and left adnexa normal         Right: No mass, tenderness or fullness  Left: No mass, tenderness or fullness       Musculoskeletal:      Cervical back: Neck supple  Lymphadenopathy:      Cervical: No cervical adenopathy  Upper Body:      Right upper body: No supraclavicular or axillary adenopathy  Left upper body: No supraclavicular or axillary adenopathy  Lower Body: No right inguinal adenopathy  No left inguinal adenopathy  Skin:     General: Skin is warm and dry  Neurological:      Mental Status: She is alert and oriented to person, place, and time  Psychiatric:         Mood and Affect: Mood normal          Behavior: Behavior normal  Behavior is cooperative  Thought Content:  Thought content normal          Judgment: Judgment normal

## 2022-07-30 LAB
C TRACH DNA SPEC QL NAA+PROBE: NEGATIVE
N GONORRHOEA DNA SPEC QL NAA+PROBE: NEGATIVE

## 2022-08-04 LAB
LAB AP GYN PRIMARY INTERPRETATION: NORMAL
LAB AP LMP: NORMAL
Lab: NORMAL

## 2022-08-18 ENCOUNTER — NURSE TRIAGE (OUTPATIENT)
Dept: OTHER | Facility: OTHER | Age: 27
End: 2022-08-18

## 2022-08-19 NOTE — TELEPHONE ENCOUNTER
Regarding: Hepatitis A Question  ----- Message from [facility] sent at 8/18/2022 10:58 PM EDT -----  "I was just fully exposed to someone who slept at my home who has Hepatitis A and I just found out they have it  I need to know how or is this really contagious   Please advise "

## 2022-08-19 NOTE — TELEPHONE ENCOUNTER
Reason for Disposition   [1] Hepatitis A EXPOSURE (to food , drink; or Close Contact with person) AND [2] within last 14 days AND [3] has NOT received Hepatitis A vaccine    Answer Assessment - Initial Assessment Questions  1  TYPE of HEPATITIS: "What type of hepatitis was it?"  (e g , Hepatitis A, B, other, unknown)      Hepatitis A    2  TYPE of CONTACT: "What kind of contact?" (e g , shared food, sexual contact)      Was sharing the vape     3  DATE of CONTACT: "When did you have contact with the hepatitis patient?"      This past weekend    4  TYPE of WORK: "What type of work do you do?"            5  SYMPTOMS: "Do you have any symptoms?" (e g , fever, abdominal pain, vomiting, yellow eyes)      Denies    6   HEPATITIS A VACCINE: "Have you received the Hepatitis A vaccine?" (Reason: Hepatitis A is protective )     Unknown    Protocols used: HEPATITIS A EXPOSURE-ADULT-AH

## 2022-08-23 ENCOUNTER — TELEPHONE (OUTPATIENT)
Dept: OBGYN CLINIC | Facility: CLINIC | Age: 27
End: 2022-08-23

## 2022-08-23 DIAGNOSIS — B37.31 CANDIDA VAGINITIS: Primary | ICD-10-CM

## 2022-08-23 RX ORDER — FLUCONAZOLE 150 MG/1
150 TABLET ORAL ONCE
Qty: 1 TABLET | Refills: 0 | Status: SHIPPED | OUTPATIENT
Start: 2022-08-23 | End: 2022-08-23

## 2022-08-29 ENCOUNTER — TELEPHONE (OUTPATIENT)
Dept: PERINATAL CARE | Facility: CLINIC | Age: 27
End: 2022-08-29

## 2022-08-29 ENCOUNTER — TELEPHONE (OUTPATIENT)
Dept: OBGYN CLINIC | Facility: CLINIC | Age: 27
End: 2022-08-29

## 2022-08-29 NOTE — TELEPHONE ENCOUNTER
Patient called with questions regarding NIPT  Testing for paternity, , I spoke with [facility] and that see her  After her firt viability visit in our office, advise once the referral is in to [facility], they will contact her to set up her appointment and to call her insurance [facility] to  See if this testing is covered, and if not to find out her out of pocket cost , patient agreed and viability appointment was made in our office

## 2022-10-12 DIAGNOSIS — Z30.9 ENCOUNTER FOR CONTRACEPTIVE MANAGEMENT, UNSPECIFIED TYPE: ICD-10-CM

## 2022-10-19 RX ORDER — LEVONORGESTREL AND ETHINYL ESTRADIOL 0.1-0.02MG
1 KIT ORAL DAILY
Qty: 84 TABLET | Refills: 0 | Status: SHIPPED | OUTPATIENT
Start: 2022-10-19

## 2022-10-28 ENCOUNTER — TELEPHONE (OUTPATIENT)
Dept: OBGYN CLINIC | Facility: CLINIC | Age: 27
End: 2022-10-28

## 2022-10-28 DIAGNOSIS — N76.0 BV (BACTERIAL VAGINOSIS): Primary | ICD-10-CM

## 2022-10-28 DIAGNOSIS — B96.89 BV (BACTERIAL VAGINOSIS): Primary | ICD-10-CM

## 2022-10-28 RX ORDER — METRONIDAZOLE 500 MG/1
500 TABLET ORAL EVERY 12 HOURS SCHEDULED
Qty: 14 TABLET | Refills: 0 | Status: SHIPPED | OUTPATIENT
Start: 2022-10-28 | End: 2022-11-04

## 2022-12-02 ENCOUNTER — TELEPHONE (OUTPATIENT)
Dept: FAMILY MEDICINE CLINIC | Facility: CLINIC | Age: 27
End: 2022-12-02

## 2023-01-03 ENCOUNTER — TELEPHONE (OUTPATIENT)
Dept: OBGYN CLINIC | Facility: CLINIC | Age: 28
End: 2023-01-03

## 2023-01-04 NOTE — TELEPHONE ENCOUNTER
Patient due for birth control check in office  Please schedule  Can discuss switch at that time  Thanks

## 2023-01-25 ENCOUNTER — TELEPHONE (OUTPATIENT)
Dept: OBGYN CLINIC | Facility: CLINIC | Age: 28
End: 2023-01-25

## 2023-01-25 NOTE — TELEPHONE ENCOUNTER
Patient had called and left a voice message that she has a positive UPT, returned the call and no voice message set up

## 2023-01-26 NOTE — TELEPHONE ENCOUNTER
Spoke with the patient she has not had a menses in 2 months since her miscarriage, appointment was made

## 2023-01-27 ENCOUNTER — TELEPHONE (OUTPATIENT)
Dept: OBGYN CLINIC | Facility: CLINIC | Age: 28
End: 2023-01-27

## 2023-01-27 DIAGNOSIS — O21.9 NAUSEA AND VOMITING DURING PREGNANCY: Primary | ICD-10-CM

## 2023-01-27 RX ORDER — METOCLOPRAMIDE 10 MG/1
10 TABLET ORAL 3 TIMES DAILY
Qty: 30 TABLET | Refills: 3 | Status: SHIPPED | OUTPATIENT
Start: 2023-01-27

## 2023-01-27 NOTE — TELEPHONE ENCOUNTER
Pt called c/o that she is constantly nauseous and unable to keep food down  Wants to know if medication can be sent to her

## 2023-01-27 NOTE — TELEPHONE ENCOUNTER
Reglan called for nausea and vomiting ginger ginger ale saltine crackers increase hydration recommended  If patient feels dehydrated and unable to keep anything down then need to go to the ER for IV hydration otherwise to keep her neck scheduled appointment vitamin B6 also recommended

## 2023-01-27 NOTE — TELEPHONE ENCOUNTER
Patient called early gestation, she has nausea and vomiting and would like something called into her pharmacy, advised if she cant keep anything done to go to the hospital for IV hydration and evaluation

## 2023-02-07 ENCOUNTER — OFFICE VISIT (OUTPATIENT)
Dept: OBGYN CLINIC | Facility: CLINIC | Age: 28
End: 2023-02-07

## 2023-02-07 VITALS
DIASTOLIC BLOOD PRESSURE: 68 MMHG | SYSTOLIC BLOOD PRESSURE: 112 MMHG | HEIGHT: 61 IN | BODY MASS INDEX: 30.21 KG/M2 | WEIGHT: 160 LBS

## 2023-02-07 DIAGNOSIS — N91.2 AMENORRHEA: Primary | ICD-10-CM

## 2023-02-07 DIAGNOSIS — R39.9 URINARY TRACT INFECTION SYMPTOMS: ICD-10-CM

## 2023-02-07 DIAGNOSIS — R82.90 ABNORMAL URINE ODOR: ICD-10-CM

## 2023-02-07 DIAGNOSIS — Z11.3 ROUTINE SCREENING FOR STI (SEXUALLY TRANSMITTED INFECTION): ICD-10-CM

## 2023-02-07 NOTE — PROGRESS NOTES
Assessment/Plan:     There are no diagnoses linked to this encounter  69-year-old female  -0-2-1  Prior to spontaneous AB  Early pregnancy unsure about LMP bedside ultrasound 8 weeks and 2 days [facility] 2023  Prior  nonreassuring fetal heart rate  Plan  Prenatal vitamin daily  Vitamin B6 as needed nausea  Return to office for OB intake      Subjective:      Patient ID: Alice Carter is a 33 y o  female  HPI  34 yo female    (1 C/S FULL TERM NRFHRT) 1 spab 12/3/2022  DIDN'T RECEIVE RHOGAM   patietn RH neg  No period since miscarriage in December 3  Did pregnancy test mid January was pos    The following portions of the patient's history were reviewed and updated as appropriate: allergies, current medications, past family history, past medical history, past social history, past surgical history and problem list     Review of Systems   Constitutional: Negative for activity change, appetite change, chills, fatigue and fever  Respiratory: Negative for apnea, cough, chest tightness and shortness of breath  Cardiovascular: Negative for chest pain, palpitations and leg swelling  Gastrointestinal: Positive for nausea  Negative for abdominal pain, constipation, diarrhea and vomiting  Genitourinary: Negative for difficulty urinating, dysuria, flank pain, frequency, hematuria and urgency  Odor   Neurological: Negative for dizziness, seizures, syncope, light-headedness, numbness and headaches  Psychiatric/Behavioral: Negative for agitation and confusion  Objective:      /68 (BP Location: Left arm, Patient Position: Sitting, Cuff Size: Adult)   Ht 5' 1" (1 549 m)   Wt 72 6 kg (160 lb)   BMI 30 23 kg/m²          Physical Exam  Constitutional:       Appearance: She is well-developed  Abdominal:      General: There is no distension  Palpations: Abdomen is soft  Tenderness: There is no abdominal tenderness     Genitourinary:     Labia:         Right: No rash, tenderness or lesion  Left: No rash, tenderness or lesion  Vagina: No signs of injury  No vaginal discharge, erythema or tenderness  Cervix: No cervical motion tenderness, discharge or friability  Adnexa:         Right: No mass, tenderness or fullness  Left: No mass, tenderness or fullness  Comments: Bedside ultrasound performed intrauterine pregnancy 8 weeks and 2 days by [facility] viable fetal heart rate 170 bpm  Neurological:      Mental Status: She is alert and oriented to person, place, and time     Psychiatric:         Behavior: Behavior normal

## 2023-02-08 LAB
C TRACH DNA SPEC QL NAA+PROBE: NEGATIVE
N GONORRHOEA DNA SPEC QL NAA+PROBE: NEGATIVE

## 2023-02-09 DIAGNOSIS — N39.0 URINARY TRACT INFECTION WITHOUT HEMATURIA, SITE UNSPECIFIED: Primary | ICD-10-CM

## 2023-02-09 LAB — BACTERIA UR CULT: ABNORMAL

## 2023-02-09 RX ORDER — CEPHALEXIN 500 MG/1
500 CAPSULE ORAL EVERY 12 HOURS SCHEDULED
Qty: 14 CAPSULE | Refills: 0 | Status: SHIPPED | OUTPATIENT
Start: 2023-02-09 | End: 2023-02-16

## 2023-02-10 ENCOUNTER — TELEPHONE (OUTPATIENT)
Dept: OBGYN CLINIC | Facility: CLINIC | Age: 28
End: 2023-02-10

## 2023-02-10 NOTE — TELEPHONE ENCOUNTER
Called the patient for her Ob Intake, , their was no answer,No mail box was set up yet , sent a SMS message for her to return the call, Called her back and she asked to be called back in 15 minutes, returned the call and was unable to leave a voice message ,  No voice message was set up yet

## 2023-02-10 NOTE — TELEPHONE ENCOUNTER
Called the patient back, she was marked a NO Show, unable to leave a voice message she has no voice mail box set up yet, letter sent via [facility]

## 2023-02-13 NOTE — TELEPHONE ENCOUNTER
Call the patient and left a voice message on her machine to return the call to reschedule her Ob intake appointment that she missed

## 2023-02-13 NOTE — TELEPHONE ENCOUNTER
Spoke with the patient she did  her medication for her UTI, and will start taking that and and she rescheduled her Ob intake for this Friday 12/17/2023

## 2023-02-17 ENCOUNTER — TELEPHONE (OUTPATIENT)
Dept: OBGYN CLINIC | Facility: CLINIC | Age: 28
End: 2023-02-17

## 2023-02-17 NOTE — TELEPHONE ENCOUNTER
Second NO SHOW for her OB intake phone intake appointment , no voice message set up yet, sent a SMS message,  Letters sent via [place] [facility], can you send a certified letter  ?

## 2023-02-17 NOTE — TELEPHONE ENCOUNTER
Called the patient to start her phone Ob intake, no answer and unable to leave a voice message on her machine, no voice message set up yet, sent SMS message

## 2023-02-20 DIAGNOSIS — O21.9 NAUSEA AND VOMITING DURING PREGNANCY: ICD-10-CM

## 2023-02-20 RX ORDER — METOCLOPRAMIDE 10 MG/1
10 TABLET ORAL 3 TIMES DAILY
Qty: 30 TABLET | Refills: 3 | Status: SHIPPED | OUTPATIENT
Start: 2023-02-20

## 2023-08-01 ENCOUNTER — TELEPHONE (OUTPATIENT)
Dept: OBGYN CLINIC | Facility: CLINIC | Age: 28
End: 2023-08-01

## 2023-08-01 DIAGNOSIS — N76.0 BV (BACTERIAL VAGINOSIS): Primary | ICD-10-CM

## 2023-08-01 DIAGNOSIS — B96.89 BV (BACTERIAL VAGINOSIS): Primary | ICD-10-CM

## 2023-08-01 RX ORDER — METRONIDAZOLE 500 MG/1
500 TABLET ORAL EVERY 12 HOURS SCHEDULED
Qty: 14 TABLET | Refills: 0 | Status: SHIPPED | OUTPATIENT
Start: 2023-08-01 | End: 2023-08-08

## 2023-09-08 ENCOUNTER — HOSPITAL ENCOUNTER (EMERGENCY)
Facility: HOSPITAL | Age: 28
Discharge: HOME | End: 2023-09-08
Attending: INTERNAL MEDICINE
Payer: COMMERCIAL

## 2023-09-08 VITALS
SYSTOLIC BLOOD PRESSURE: 111 MMHG | RESPIRATION RATE: 18 BRPM | DIASTOLIC BLOOD PRESSURE: 72 MMHG | HEART RATE: 81 BPM | OXYGEN SATURATION: 100 % | TEMPERATURE: 97.9 F

## 2023-09-08 DIAGNOSIS — J06.9 VIRAL URI WITH COUGH: Primary | ICD-10-CM

## 2023-09-08 LAB
BILIRUB UR QL STRIP: NEGATIVE
CLARITY UR: CLEAR
COLOR UR: YELLOW
EXT PREGNANCY TEST URINE: NEGATIVE
EXT. CONTROL: NORMAL
FLUAV RNA RESP QL NAA+PROBE: NEGATIVE
FLUBV RNA RESP QL NAA+PROBE: NEGATIVE
GLUCOSE UR STRIP-MCNC: NEGATIVE MG/DL
HGB UR QL STRIP.AUTO: NEGATIVE
KETONES UR STRIP-MCNC: NEGATIVE MG/DL
LEUKOCYTE ESTERASE UR QL STRIP: NEGATIVE
NITRITE UR QL STRIP: NEGATIVE
PH UR STRIP.AUTO: 6 [PH]
PROT UR STRIP-MCNC: NEGATIVE MG/DL
RSV RNA RESP QL NAA+PROBE: NEGATIVE
SARS-COV-2 RNA RESP QL NAA+PROBE: NEGATIVE
SP GR UR STRIP.AUTO: >=1.03 (ref 1–1.03)
UROBILINOGEN UR QL STRIP.AUTO: 1 E.U./DL

## 2023-09-08 PROCEDURE — 99283 EMERGENCY DEPT VISIT LOW MDM: CPT

## 2023-09-08 PROCEDURE — 81025 URINE PREGNANCY TEST: CPT | Performed by: PHYSICIAN ASSISTANT

## 2023-09-08 PROCEDURE — 0241U HB NFCT DS VIR RESP RNA 4 TRGT: CPT | Performed by: PHYSICIAN ASSISTANT

## 2023-09-08 PROCEDURE — 99285 EMERGENCY DEPT VISIT HI MDM: CPT | Performed by: PHYSICIAN ASSISTANT

## 2023-09-08 PROCEDURE — 81003 URINALYSIS AUTO W/O SCOPE: CPT | Performed by: PHYSICIAN ASSISTANT

## 2023-09-08 NOTE — ED PROVIDER NOTES
History  Chief Complaint   Patient presents with   • Sore Throat     " I think I have covid", sore throat and headache     HPI: Patient is a 33 y.o. female who presents with 3 days of intermittent, diffuse headache, sore throat and myalgias, congestion which the patient describes at mild The patient has had contact with people with similar symptoms, has been around them for several days, just tested + for COVID today. 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, drives, but wasn't feeling well, so came to ED to be checked for COVID    No Known Allergies    Past Medical History:   Past Surgical History: ELBOW SURGERY; Right, INDUCED   Social History    Tobacco Use      Smoking status: Former      Smokeless tobacco: Never    Vaping Use      Vaping Use: Never used    Alcohol use: No    Drug use: No      Nursing notes reviewed  Physical Exam:  ED Triage Vitals (23 1747)  Temperature: 97.9 °F (36.6 °C)  Pulse: 81  Respirations: 18  Blood Pressure: 111/72  SpO2: 100 %  Temp Source: Oral  Heart Rate Source: Monitor  Patient Position - Orthostatic VS: Sitting  BP Location: Left arm  FiO2 (%): n/a  Pain Score: 6    ROS: Positive for headache, sore throat, myalgias, congestion , the remainder of a 10 organ system ROS was otherwise unremarkable.     General: awake, alert, no acute distress  Head: normocephalic, atraumatic  Eyes: no scleral icterus  Ears: external ears normal, hearing grossly intact, TMs intact, no erythema, no bulging, no discharge  Nose: external exam grossly normal, positive clear nasal discharge  Pharynx:, No erythema, no exudate, uvula midline  Neck: symmetric, No JVD noted, trachea midline  Pulmonary: no respiratory distress, no tachypnea noted, no wheezing, no rhonchi  Cardiovascular: appears well perfused  Abdomen: no distention noted  Musculoskeletal: no deformities noted, tone normal  Neuro: grossly non-focal  Psych: mood and affect appropriate    The patient is stable and has a history and physical exam consistent with a viral illness. COVID19 testing has been performed. I considered the patient's other medical conditions as applicable/noted above in my medical decision making. The patient is stable upon discharge. The plan is for supportive care at home. The patient (and any family present) verbalized understanding of the discharge instructions and warnings that would necessitate return to the Emergency Department. All questions were answered prior to discharge. Medications - No data to display  Final diagnoses:  None  ED Disposition     None      Follow-up Information    None     Patient's Medications    Discharge Prescriptions  No medications on file    No discharge procedures on file. Electronically Signed by            Prior to Admission Medications   Prescriptions Last Dose Informant Patient Reported?  Taking?   levonorgestrel-ethinyl estradiol Ebonie Kaylene) 0.1-20 MG-MCG per tablet   No No   Sig: Take 1 tablet by mouth daily   metoclopramide (Reglan) 10 mg tablet   No No   Sig: Take 1 tablet (10 mg total) by mouth 3 (three) times a day   nitrofurantoin (MACROBID) 100 mg capsule   Yes No   Sig: Take 100 mg by mouth 2 (two) times a day   ondansetron (Zofran ODT) 4 mg disintegrating tablet   No No   Sig: Take 1 tablet (4 mg total) by mouth every 6 (six) hours as needed for nausea or vomiting   phenazopyridine (PYRIDIUM) 200 mg tablet   Yes No   Sig: Take 200 mg by mouth 3 (three) times a day      Facility-Administered Medications: None       Past Medical History:   Diagnosis Date   •     • Miscarriage 2017    miscarriage x1   • Urinary tract infection     Hx UTI x1 in the past   • Varicella     recv'd vaccine       Past Surgical History:   Procedure Laterality Date   • ELBOW SURGERY Right    • ELBOW SURGERY Right     from elbow and arm fracture   • FRACTURE SURGERY     • INDUCED          Family History Problem Relation Age of Onset   • Cancer Mother    • No Known Problems Father    • No Known Problems Sister    • No Known Problems Brother    • Diabetes Paternal Grandfather    • Breast cancer Family      I have reviewed and agree with the history as documented. E-Cigarette/Vaping   • E-Cigarette Use Never User      E-Cigarette/Vaping Substances   • Nicotine No    • THC No    • CBD No    • Flavoring No    • Other No    • Unknown No      Social History     Tobacco Use   • Smoking status: Former   • Smokeless tobacco: Never   Vaping Use   • Vaping Use: Never used   Substance Use Topics   • Alcohol use: No   • Drug use: No       Review of Systems    Physical Exam  Physical Exam    Vital Signs  ED Triage Vitals [09/08/23 1747]   Temperature Pulse Respirations Blood Pressure SpO2   97.9 °F (36.6 °C) 81 18 111/72 100 %      Temp Source Heart Rate Source Patient Position - Orthostatic VS BP Location FiO2 (%)   Oral Monitor Sitting Left arm --      Pain Score       6           Vitals:    09/08/23 1747   BP: 111/72   Pulse: 81   Patient Position - Orthostatic VS: Sitting         Visual Acuity  Visual Acuity    Flowsheet Row Most Recent Value   L Pupil Size (mm) 3   R Pupil Size (mm) 3          ED Medications  Medications - No data to display    Diagnostic Studies  Results Reviewed     Procedure Component Value Units Date/Time    FLU/RSV/COVID - if FLU/RSV clinically relevant [[id number]]  (Normal) Collected: 09/08/23 1801    Lab Status: Final result Specimen: Nares from Nose Updated: 09/08/23 1841     SARS-CoV-2 Negative     INFLUENZA A PCR Negative     INFLUENZA B PCR Negative     RSV PCR Negative    Narrative:      FOR PEDIATRIC PATIENTS - copy/paste COVID Guidelines URL to browser: [email or web address]    SARS-CoV-2 assay is a Nucleic Acid Amplification assay intended for the  qualitative detection of nucleic acid from SARS-CoV-2 in nasopharyngeal  swabs.  Results are for the presumptive identification of SARS-CoV-2 RNA. Positive results are indicative of infection with SARS-CoV-2, the virus  causing COVID-19, but do not rule out bacterial infection or co-infection  with other viruses. Laboratories within the [facility] and its  territories are required to report all positive results to the appropriate  public health authorities. Negative results do not preclude SARS-CoV-2  infection and should not be used as the sole basis for treatment or other  patient management decisions. Negative results must be combined with  clinical observations, patient history, and epidemiological information. This test has not been FDA cleared or approved. This test has been authorized by FDA under an Emergency Use Authorization  (EUA). This test is only authorized for the duration of time the  declaration that circumstances exist justifying the authorization of the  emergency use of an in vitro diagnostic tests for detection of SARS-CoV-2  virus and/or diagnosis of COVID-19 infection under section 564(b)(1) of  the Act, 21 U. S.C. 932BCU-6(A)(7), unless the authorization is terminated  or revoked sooner. The test has been validated but independent review by FDA  and CLIA is pending. Test performed using [employer]: This RT-PCR assay targets N2,  a region unique to SARS-CoV-2. A conserved region in the E-gene was chosen  for pan-Sarbecovirus detection which includes SARS-CoV-2. According to CMS-2020-01-R, this platform meets the definition of high-throughput technology.     UA w Reflex to Microscopic w Reflex to Culture [[id number]]  (Normal) Collected: 09/08/23 1800    Lab Status: Final result Specimen: Urine, Clean Catch Updated: 09/08/23 1806     Color, UA Yellow     Clarity, UA Clear     Specific Gravity, UA >=1.030     pH, UA 6.0     Leukocytes, UA Negative     Nitrite, UA Negative     Protein, UA Negative mg/dl      Glucose, UA Negative mg/dl      Ketones, UA Negative mg/dl      Urobilinogen, UA 1.0 E.U./dl      Bilirubin, UA Negative     Occult Blood, UA Negative    POCT pregnancy, urine [[id number]]  (Normal) Resulted: 09/08/23 1801    Lab Status: Final result Updated: 09/08/23 1801     EXT Preg Test, Ur Negative     Control Valid                 No orders to display              Procedures  Procedures         ED Course  ED Course as of 09/08/23 1921   Fri Sep 08, 2023   1801 PREGNANCY TEST URINE: Negative   1831 UA shows no UTI                                             Medical Decision Making  Amount and/or Complexity of Data Reviewed  Labs: ordered. Decision-making details documented in ED Course. Details: Called pt and made her aware of negative Viral panel           Disposition  Final diagnoses:   Viral URI with cough     Time reflects when diagnosis was documented in both MDM as applicable and the Disposition within this note     Time User Action Codes Description Comment    9/8/2023  6:02 PM Mary Alice Given Add [J06.9] Viral URI with cough       ED Disposition     ED Disposition   Discharge    Condition   Stable    Date/Time   Fri Sep 8, 2023  6:02 PM    Comment   Bret Borjas discharge to home/self care.                Follow-up Information    None         Discharge Medication List as of 9/8/2023  6:02 PM      CONTINUE these medications which have NOT CHANGED    Details   metoclopramide (Reglan) 10 mg tablet Take 1 tablet (10 mg total) by mouth 3 (three) times a day, Starting Mon 2/20/2023, Normal      levonorgestrel-ethinyl estradiol Jestine Jairon) 0.1-20 MG-MCG per tablet Take 1 tablet by mouth daily, Starting Wed 10/19/2022, Normal      nitrofurantoin (MACROBID) 100 mg capsule Take 100 mg by mouth 2 (two) times a day, Starting Tue 11/15/2022, Historical Med      ondansetron (Zofran ODT) 4 mg disintegrating tablet Take 1 tablet (4 mg total) by mouth every 6 (six) hours as needed for nausea or vomiting, Starting Mon 1/3/2022, Normal      phenazopyridine (PYRIDIUM) 200 mg tablet Take 200 mg by mouth 3 (three) times a day, Starting Tue 11/15/2022, Historical Med             No discharge procedures on file.     PDMP Review     None          ED Provider  Electronically Signed by           Jacqueline Murphy PA-C  09/08/23 1921

## 2023-09-08 NOTE — DISCHARGE INSTRUCTIONS
You were tested today for COVID-19. You must continue quarantining until test results are negative. If test is positive , you must continue isolation for 5 days since onset of symptoms (which were 3 days ago), must be fever free for 24 hours and show improvement of symptoms, then continue to wear mask for 5 more days. Please follow-up with your primary care doctor for any notes needed.

## 2024-02-19 ENCOUNTER — HOSPITAL ENCOUNTER (INPATIENT)
Age: 29
LOS: 7 days | Discharge: NURSING, REHAB OR LONG-TERM CARE | End: 2024-02-26
Attending: OBSTETRICS & GYNECOLOGY | Admitting: OBSTETRICS & GYNECOLOGY
Payer: MEDICAID

## 2024-02-19 ENCOUNTER — ANESTHESIA EVENT (OUTPATIENT)
Dept: LABOR AND DELIVERY | Age: 29
End: 2024-02-19

## 2024-02-19 ENCOUNTER — ANESTHESIA (OUTPATIENT)
Dept: LABOR AND DELIVERY | Age: 29
End: 2024-02-19

## 2024-02-19 ENCOUNTER — APPOINTMENT (OUTPATIENT)
Dept: GENERAL RADIOLOGY | Age: 29
End: 2024-02-19
Payer: MEDICAID

## 2024-02-19 DIAGNOSIS — Z98.891 H/O CESAREAN SECTION: Primary | ICD-10-CM

## 2024-02-19 DIAGNOSIS — F13.939 BENZODIAZEPINE WITHDRAWAL WITH COMPLICATION (HCC): ICD-10-CM
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
## 2024-02-19 PROBLEM — Z3A.39 39 WEEKS GESTATION OF PREGNANCY: Status: ACTIVE | Noted: 2024-02-19

## 2024-02-19 PROBLEM — S27.0XXA TRAUMATIC PNEUMOTHORAX: Status: RESOLVED | Noted: 2019-11-03 | Resolved: 2024-02-19

## 2024-02-19 PROBLEM — I95.9 HYPOTENSION: Status: RESOLVED | Noted: 2019-11-03 | Resolved: 2024-02-19

## 2024-02-19 PROBLEM — Z3A.39 39 WEEKS GESTATION OF PREGNANCY: Status: RESOLVED | Noted: 2024-02-19 | Resolved: 2024-02-19

## 2024-02-19 LAB
ALBUMIN SERPL-MCNC: 3.3 G/DL (ref 3.5–5.2)
ALBUMIN/GLOB SERPL: 0.8 {RATIO} (ref 1–2.5)
ALP SERPL-CCNC: 225 U/L (ref 35–104)
ALT SERPL-CCNC: 13 U/L (ref 5–33)
AMPHET UR QL SCN: NEGATIVE
ANION GAP SERPL CALCULATED.3IONS-SCNC: 14 MMOL/L (ref 9–17)
AST SERPL-CCNC: 22 U/L
BARBITURATES UR QL SCN: NEGATIVE
BASOPHILS # BLD: <0.03 K/UL (ref 0–0.2)
BASOPHILS NFR BLD: 0 % (ref 0–2)
BENZODIAZ UR QL: POSITIVE
BILIRUB SERPL-MCNC: 0.4 MG/DL (ref 0.3–1.2)
BUN SERPL-MCNC: 11 MG/DL (ref 6–20)
CALCIUM SERPL-MCNC: 9 MG/DL (ref 8.6–10.4)
CANNABINOIDS UR QL SCN: NEGATIVE
CHLAMYDIA DNA UR QL NAA+PROBE: NEGATIVE
CHLORIDE SERPL-SCNC: 99 MMOL/L (ref 98–107)
CO2 SERPL-SCNC: 21 MMOL/L (ref 20–31)
COCAINE UR QL SCN: POSITIVE
CREAT SERPL-MCNC: 0.8 MG/DL (ref 0.5–0.9)
CREAT UR-MCNC: 24.8 MG/DL (ref 28–217)
EOSINOPHIL # BLD: <0.03 K/UL (ref 0–0.44)
EOSINOPHILS RELATIVE PERCENT: 0 % (ref 1–4)
ERYTHROCYTE [DISTWIDTH] IN BLOOD BY AUTOMATED COUNT: 15.4 % (ref 11.8–14.4)
FENTANYL UR QL: POSITIVE
GFR SERPL CREATININE-BSD FRML MDRD: >60 ML/MIN/1.73M2
GLUCOSE SERPL-MCNC: 99 MG/DL (ref 70–99)
HBV SURFACE AG SERPL QL IA: NONREACTIVE
HCT VFR BLD AUTO: 32.9 % (ref 36.3–47.1)
HCV AB SERPL QL IA: NONREACTIVE
HGB BLD-MCNC: 10 G/DL (ref 11.9–15.1)
HIV 1+2 AB+HIV1 P24 AG SERPL QL IA: NONREACTIVE
IMM GRANULOCYTES # BLD AUTO: 0.08 K/UL (ref 0–0.3)
IMM GRANULOCYTES NFR BLD: 1 %
LYMPHOCYTES NFR BLD: 1.37 K/UL (ref 1.1–3.7)
LYMPHOCYTES RELATIVE PERCENT: 11 % (ref 24–43)
MCH RBC QN AUTO: 21.6 PG (ref 25.2–33.5)
MCHC RBC AUTO-ENTMCNC: 30.4 G/DL (ref 28.4–34.8)
MCV RBC AUTO: 70.9 FL (ref 82.6–102.9)
METHADONE UR QL: NEGATIVE
MONOCYTES NFR BLD: 0.54 K/UL (ref 0.1–1.2)
MONOCYTES NFR BLD: 4 % (ref 3–12)
N GONORRHOEA DNA UR QL NAA+PROBE: NEGATIVE
NEUTROPHILS NFR BLD: 84 % (ref 36–65)
NEUTS SEG NFR BLD: 10.43 K/UL (ref 1.5–8.1)
NRBC BLD-RTO: 0 PER 100 WBC
OPIATES UR QL SCN: POSITIVE
OXYCODONE UR QL SCN: NEGATIVE
PCP UR QL SCN: NEGATIVE
PLATELET # BLD AUTO: 424 K/UL (ref 138–453)
PMV BLD AUTO: 10.6 FL (ref 8.1–13.5)
POTASSIUM SERPL-SCNC: 3.6 MMOL/L (ref 3.7–5.3)
PROT SERPL-MCNC: 7.3 G/DL (ref 6.4–8.3)
RBC # BLD AUTO: 4.64 M/UL (ref 3.95–5.11)
RBC # BLD: ABNORMAL 10*6/UL
RUBV IGG SERPL QL IA: 6.85 IU/ML
SODIUM SERPL-SCNC: 134 MMOL/L (ref 135–144)
SPECIMEN DESCRIPTION: NORMAL
T PALLIDUM AB SER QL IA: NONREACTIVE
TEST INFORMATION: ABNORMAL
TOTAL PROTEIN, URINE: 13 MG/DL
URINE TOTAL PROTEIN CREATININE RATIO: 0.54
WBC OTHER # BLD: 12.5 K/UL (ref 3.5–11.3)

## 2024-02-19 PROCEDURE — 6360000002 HC RX W HCPCS

## 2024-02-19 PROCEDURE — 2500000003 HC RX 250 WO HCPCS

## 2024-02-19 PROCEDURE — 2709999900 HC NON-CHARGEABLE SUPPLY: Performed by: OBSTETRICS & GYNECOLOGY

## 2024-02-19 PROCEDURE — 80053 COMPREHEN METABOLIC PANEL: CPT

## 2024-02-19 PROCEDURE — 88307 TISSUE EXAM BY PATHOLOGIST: CPT

## 2024-02-19 PROCEDURE — 3609079900 HC CESAREAN SECTION: Performed by: OBSTETRICS & GYNECOLOGY

## 2024-02-19 PROCEDURE — 87081 CULTURE SCREEN ONLY: CPT

## 2024-02-19 PROCEDURE — 86762 RUBELLA ANTIBODY: CPT

## 2024-02-19 PROCEDURE — 0UQ90ZZ REPAIR UTERUS, OPEN APPROACH: ICD-10-PCS | Performed by: OBSTETRICS & GYNECOLOGY

## 2024-02-19 PROCEDURE — 2580000003 HC RX 258

## 2024-02-19 PROCEDURE — 86901 BLOOD TYPING SEROLOGIC RH(D): CPT

## 2024-02-19 PROCEDURE — 52000 CYSTOURETHROSCOPY: CPT | Performed by: OBSTETRICS & GYNECOLOGY

## 2024-02-19 PROCEDURE — 87086 URINE CULTURE/COLONY COUNT: CPT

## 2024-02-19 PROCEDURE — 82570 ASSAY OF URINE CREATININE: CPT

## 2024-02-19 PROCEDURE — 7100000001 HC PACU RECOVERY - ADDTL 15 MIN: Performed by: OBSTETRICS & GYNECOLOGY

## 2024-02-19 PROCEDURE — 86920 COMPATIBILITY TEST SPIN: CPT

## 2024-02-19 PROCEDURE — 86850 RBC ANTIBODY SCREEN: CPT

## 2024-02-19 PROCEDURE — 80307 DRUG TEST PRSMV CHEM ANLYZR: CPT

## 2024-02-19 PROCEDURE — 7100000000 HC PACU RECOVERY - FIRST 15 MIN: Performed by: OBSTETRICS & GYNECOLOGY

## 2024-02-19 PROCEDURE — 74018 RADEX ABDOMEN 1 VIEW: CPT

## 2024-02-19 PROCEDURE — 6370000000 HC RX 637 (ALT 250 FOR IP)

## 2024-02-19 PROCEDURE — 84156 ASSAY OF PROTEIN URINE: CPT

## 2024-02-19 PROCEDURE — 2720000010 HC SURG SUPPLY STERILE: Performed by: OBSTETRICS & GYNECOLOGY

## 2024-02-19 PROCEDURE — 0TJB8ZZ INSPECTION OF BLADDER, VIA NATURAL OR ARTIFICIAL OPENING ENDOSCOPIC: ICD-10-PCS | Performed by: OBSTETRICS & GYNECOLOGY

## 2024-02-19 PROCEDURE — 3700000001 HC ADD 15 MINUTES (ANESTHESIA): Performed by: OBSTETRICS & GYNECOLOGY

## 2024-02-19 PROCEDURE — 87340 HEPATITIS B SURFACE AG IA: CPT

## 2024-02-19 PROCEDURE — 86803 HEPATITIS C AB TEST: CPT

## 2024-02-19 PROCEDURE — 87591 N.GONORRHOEAE DNA AMP PROB: CPT

## 2024-02-19 PROCEDURE — 87491 CHLMYD TRACH DNA AMP PROBE: CPT

## 2024-02-19 PROCEDURE — 86900 BLOOD TYPING SEROLOGIC ABO: CPT

## 2024-02-19 PROCEDURE — 86780 TREPONEMA PALLIDUM: CPT

## 2024-02-19 PROCEDURE — 85025 COMPLETE CBC W/AUTO DIFF WBC: CPT

## 2024-02-19 PROCEDURE — 87389 HIV-1 AG W/HIV-1&-2 AB AG IA: CPT

## 2024-02-19 PROCEDURE — 1220000000 HC SEMI PRIVATE OB R&B

## 2024-02-19 PROCEDURE — 3700000000 HC ANESTHESIA ATTENDED CARE: Performed by: OBSTETRICS & GYNECOLOGY

## 2024-02-19 RX ORDER — ONDANSETRON 2 MG/ML
4 INJECTION INTRAMUSCULAR; INTRAVENOUS EVERY 6 HOURS PRN
Status: DISCONTINUED | OUTPATIENT
Start: 2024-02-19 | End: 2024-02-19

## 2024-02-19 RX ORDER — SODIUM CHLORIDE, SODIUM LACTATE, POTASSIUM CHLORIDE, CALCIUM CHLORIDE 600; 310; 30; 20 MG/100ML; MG/100ML; MG/100ML; MG/100ML
INJECTION, SOLUTION INTRAVENOUS CONTINUOUS
Status: DISCONTINUED | OUTPATIENT
Start: 2024-02-19 | End: 2024-02-19

## 2024-02-19 RX ORDER — ACETAMINOPHEN 500 MG
500 TABLET ORAL 4 TIMES DAILY PRN
Qty: 30 TABLET | Refills: 1 | Status: SHIPPED | OUTPATIENT
Start: 2024-02-19

## 2024-02-19 RX ORDER — DIPHENHYDRAMINE HCL 25 MG
25 TABLET ORAL EVERY 4 HOURS PRN
Status: DISCONTINUED | OUTPATIENT
Start: 2024-02-19 | End: 2024-02-19

## 2024-02-19 RX ORDER — AZITHROMYCIN 500 MG/1
INJECTION, POWDER, LYOPHILIZED, FOR SOLUTION INTRAVENOUS PRN
Status: DISCONTINUED | OUTPATIENT
Start: 2024-02-19 | End: 2024-02-19 | Stop reason: SDUPTHER

## 2024-02-19 RX ORDER — SODIUM CHLORIDE, SODIUM LACTATE, POTASSIUM CHLORIDE, AND CALCIUM CHLORIDE .6; .31; .03; .02 G/100ML; G/100ML; G/100ML; G/100ML
500 INJECTION, SOLUTION INTRAVENOUS PRN
Status: DISCONTINUED | OUTPATIENT
Start: 2024-02-19 | End: 2024-02-19

## 2024-02-19 RX ORDER — SODIUM CHLORIDE 0.9 % (FLUSH) 0.9 %
5-40 SYRINGE (ML) INJECTION PRN
Status: DISCONTINUED | OUTPATIENT
Start: 2024-02-19 | End: 2024-02-19 | Stop reason: HOSPADM

## 2024-02-19 RX ORDER — ONDANSETRON 2 MG/ML
4 INJECTION INTRAMUSCULAR; INTRAVENOUS
Status: DISCONTINUED | OUTPATIENT
Start: 2024-02-19 | End: 2024-02-19 | Stop reason: HOSPADM

## 2024-02-19 RX ORDER — ONDANSETRON 4 MG/1
4 TABLET, ORALLY DISINTEGRATING ORAL EVERY 8 HOURS PRN
Status: DISCONTINUED | OUTPATIENT
Start: 2024-02-19 | End: 2024-02-26 | Stop reason: HOSPADM

## 2024-02-19 RX ORDER — DEXAMETHASONE SODIUM PHOSPHATE 10 MG/ML
INJECTION, SOLUTION INTRAMUSCULAR; INTRAVENOUS PRN
Status: DISCONTINUED | OUTPATIENT
Start: 2024-02-19 | End: 2024-02-19 | Stop reason: SDUPTHER

## 2024-02-19 RX ORDER — SIMETHICONE 80 MG
80 TABLET,CHEWABLE ORAL EVERY 6 HOURS PRN
Status: DISCONTINUED | OUTPATIENT
Start: 2024-02-19 | End: 2024-02-26 | Stop reason: HOSPADM

## 2024-02-19 RX ORDER — SUCCINYLCHOLINE CHLORIDE 20 MG/ML
INJECTION INTRAMUSCULAR; INTRAVENOUS PRN
Status: DISCONTINUED | OUTPATIENT
Start: 2024-02-19 | End: 2024-02-19 | Stop reason: SDUPTHER

## 2024-02-19 RX ORDER — OXYCODONE HYDROCHLORIDE 5 MG/1
5 TABLET ORAL EVERY 4 HOURS PRN
Status: DISCONTINUED | OUTPATIENT
Start: 2024-02-19 | End: 2024-02-26 | Stop reason: HOSPADM

## 2024-02-19 RX ORDER — CEPHALEXIN 500 MG/1
500 CAPSULE ORAL EVERY 8 HOURS SCHEDULED
Status: COMPLETED | OUTPATIENT
Start: 2024-02-20 | End: 2024-02-21

## 2024-02-19 RX ORDER — METRONIDAZOLE 500 MG/1
500 TABLET ORAL EVERY 8 HOURS SCHEDULED
Status: COMPLETED | OUTPATIENT
Start: 2024-02-20 | End: 2024-02-21

## 2024-02-19 RX ORDER — TRANEXAMIC ACID 100 MG/ML
INJECTION, SOLUTION INTRAVENOUS PRN
Status: DISCONTINUED | OUTPATIENT
Start: 2024-02-19 | End: 2024-02-19 | Stop reason: SDUPTHER

## 2024-02-19 RX ORDER — ONDANSETRON 2 MG/ML
4 INJECTION INTRAMUSCULAR; INTRAVENOUS EVERY 6 HOURS PRN
Status: DISCONTINUED | OUTPATIENT
Start: 2024-02-19 | End: 2024-02-26 | Stop reason: HOSPADM

## 2024-02-19 RX ORDER — MIDAZOLAM HYDROCHLORIDE 1 MG/ML
INJECTION INTRAMUSCULAR; INTRAVENOUS PRN
Status: DISCONTINUED | OUTPATIENT
Start: 2024-02-19 | End: 2024-02-19 | Stop reason: SDUPTHER

## 2024-02-19 RX ORDER — BISACODYL 10 MG
10 SUPPOSITORY, RECTAL RECTAL DAILY PRN
Status: DISCONTINUED | OUTPATIENT
Start: 2024-02-19 | End: 2024-02-26 | Stop reason: HOSPADM

## 2024-02-19 RX ORDER — IBUPROFEN 600 MG/1
600 TABLET ORAL EVERY 6 HOURS
Status: DISCONTINUED | OUTPATIENT
Start: 2024-02-20 | End: 2024-02-26 | Stop reason: HOSPADM

## 2024-02-19 RX ORDER — FERROUS SULFATE 325(65) MG
325 TABLET ORAL EVERY OTHER DAY
Qty: 15 TABLET | Refills: 3 | Status: SHIPPED | OUTPATIENT
Start: 2024-02-19 | End: 2024-03-20

## 2024-02-19 RX ORDER — SODIUM CHLORIDE 9 MG/ML
INJECTION, SOLUTION INTRAVENOUS PRN
Status: DISCONTINUED | OUTPATIENT
Start: 2024-02-19 | End: 2024-02-26 | Stop reason: HOSPADM

## 2024-02-19 RX ORDER — ONDANSETRON 2 MG/ML
INJECTION INTRAMUSCULAR; INTRAVENOUS PRN
Status: DISCONTINUED | OUTPATIENT
Start: 2024-02-19 | End: 2024-02-19 | Stop reason: SDUPTHER

## 2024-02-19 RX ORDER — ACETAMINOPHEN 500 MG
1000 TABLET ORAL EVERY 6 HOURS SCHEDULED
Status: DISCONTINUED | OUTPATIENT
Start: 2024-02-19 | End: 2024-02-26 | Stop reason: HOSPADM

## 2024-02-19 RX ORDER — IBUPROFEN 600 MG/1
600 TABLET ORAL 4 TIMES DAILY PRN
Qty: 30 TABLET | Refills: 1 | Status: SHIPPED | OUTPATIENT
Start: 2024-02-19

## 2024-02-19 RX ORDER — SODIUM CHLORIDE 9 MG/ML
25 INJECTION, SOLUTION INTRAVENOUS PRN
Status: DISCONTINUED | OUTPATIENT
Start: 2024-02-19 | End: 2024-02-19

## 2024-02-19 RX ORDER — FENTANYL CITRATE 50 UG/ML
INJECTION, SOLUTION INTRAMUSCULAR; INTRAVENOUS PRN
Status: DISCONTINUED | OUTPATIENT
Start: 2024-02-19 | End: 2024-02-19 | Stop reason: SDUPTHER

## 2024-02-19 RX ORDER — POLYETHYLENE GLYCOL 3350 17 G/17G
17 POWDER, FOR SOLUTION ORAL DAILY PRN
Status: DISCONTINUED | OUTPATIENT
Start: 2024-02-19 | End: 2024-02-26 | Stop reason: HOSPADM

## 2024-02-19 RX ORDER — SODIUM CHLORIDE 0.9 % (FLUSH) 0.9 %
5-40 SYRINGE (ML) INJECTION EVERY 12 HOURS SCHEDULED
Status: DISCONTINUED | OUTPATIENT
Start: 2024-02-19 | End: 2024-02-26 | Stop reason: HOSPADM

## 2024-02-19 RX ORDER — SODIUM CHLORIDE, SODIUM LACTATE, POTASSIUM CHLORIDE, CALCIUM CHLORIDE 600; 310; 30; 20 MG/100ML; MG/100ML; MG/100ML; MG/100ML
INJECTION, SOLUTION INTRAVENOUS CONTINUOUS PRN
Status: DISCONTINUED | OUTPATIENT
Start: 2024-02-19 | End: 2024-02-19 | Stop reason: SDUPTHER

## 2024-02-19 RX ORDER — ACETAMINOPHEN 500 MG
1000 TABLET ORAL EVERY 6 HOURS PRN
Status: DISCONTINUED | OUTPATIENT
Start: 2024-02-19 | End: 2024-02-19

## 2024-02-19 RX ORDER — SODIUM CHLORIDE 0.9 % (FLUSH) 0.9 %
5-40 SYRINGE (ML) INJECTION EVERY 12 HOURS SCHEDULED
Status: DISCONTINUED | OUTPATIENT
Start: 2024-02-19 | End: 2024-02-19

## 2024-02-19 RX ORDER — SODIUM CHLORIDE 0.9 % (FLUSH) 0.9 %
5-40 SYRINGE (ML) INJECTION EVERY 12 HOURS SCHEDULED
Status: DISCONTINUED | OUTPATIENT
Start: 2024-02-19 | End: 2024-02-19 | Stop reason: HOSPADM

## 2024-02-19 RX ORDER — SODIUM CHLORIDE, SODIUM LACTATE, POTASSIUM CHLORIDE, AND CALCIUM CHLORIDE .6; .31; .03; .02 G/100ML; G/100ML; G/100ML; G/100ML
1000 INJECTION, SOLUTION INTRAVENOUS PRN
Status: DISCONTINUED | OUTPATIENT
Start: 2024-02-19 | End: 2024-02-19

## 2024-02-19 RX ORDER — SODIUM CHLORIDE 0.9 % (FLUSH) 0.9 %
5-40 SYRINGE (ML) INJECTION PRN
Status: DISCONTINUED | OUTPATIENT
Start: 2024-02-19 | End: 2024-02-26 | Stop reason: HOSPADM

## 2024-02-19 RX ORDER — LANOLIN 72 %
OINTMENT (GRAM) TOPICAL
Status: DISCONTINUED | OUTPATIENT
Start: 2024-02-19 | End: 2024-02-26 | Stop reason: HOSPADM

## 2024-02-19 RX ORDER — VITAMIN A, ASCORBIC ACID, CHOLECALCIFEROL, .ALPHA.-TOCOPHEROL ACETATE, DL-, THIAMINE MONONITRATE, RIBOFLAVIN, NIACINAMIDE, PYRIDOXINE HYDROCHLORIDE, FOLIC ACID, CYANOCOBALAMIN, CALCIUM CARBONATE, IRON, ZINC OXIDE, AND CUPRIC OXIDE 4000; 120; 400; 22; 1.84; 3; 20; 10; 1; 12; 200; 29; 25; 2 [IU]/1; MG/1; [IU]/1; [IU]/1; MG/1; MG/1; MG/1; MG/1; MG/1; UG/1; MG/1; MG/1; MG/1; MG/1
1 TABLET ORAL DAILY
Status: DISCONTINUED | OUTPATIENT
Start: 2024-02-19 | End: 2024-02-26 | Stop reason: HOSPADM

## 2024-02-19 RX ORDER — OXYCODONE HYDROCHLORIDE 5 MG/1
10 TABLET ORAL EVERY 4 HOURS PRN
Status: DISCONTINUED | OUTPATIENT
Start: 2024-02-19 | End: 2024-02-26 | Stop reason: HOSPADM

## 2024-02-19 RX ORDER — SEVOFLURANE 250 ML/250ML
1 LIQUID RESPIRATORY (INHALATION) CONTINUOUS PRN
Status: DISCONTINUED | OUTPATIENT
Start: 2024-02-19 | End: 2024-02-19

## 2024-02-19 RX ORDER — MORPHINE SULFATE 1 MG/ML
INJECTION, SOLUTION EPIDURAL; INTRATHECAL; INTRAVENOUS PRN
Status: DISCONTINUED | OUTPATIENT
Start: 2024-02-19 | End: 2024-02-19 | Stop reason: SDUPTHER

## 2024-02-19 RX ORDER — HYDRALAZINE HYDROCHLORIDE 20 MG/ML
10 INJECTION INTRAMUSCULAR; INTRAVENOUS
Status: DISCONTINUED | OUTPATIENT
Start: 2024-02-19 | End: 2024-02-19 | Stop reason: HOSPADM

## 2024-02-19 RX ORDER — SODIUM CHLORIDE 0.9 % (FLUSH) 0.9 %
5-40 SYRINGE (ML) INJECTION PRN
Status: DISCONTINUED | OUTPATIENT
Start: 2024-02-19 | End: 2024-02-19

## 2024-02-19 RX ORDER — OXYCODONE HYDROCHLORIDE 5 MG/1
5 TABLET ORAL EVERY 6 HOURS PRN
Qty: 20 TABLET | Refills: 0 | Status: SHIPPED | OUTPATIENT
Start: 2024-02-19 | End: 2024-02-23 | Stop reason: HOSPADM

## 2024-02-19 RX ORDER — AMOXICILLIN 250 MG
1 CAPSULE ORAL DAILY
Qty: 30 TABLET | Refills: 0 | Status: SHIPPED | OUTPATIENT
Start: 2024-02-19

## 2024-02-19 RX ORDER — DOCUSATE SODIUM 100 MG/1
100 CAPSULE, LIQUID FILLED ORAL 2 TIMES DAILY
Status: DISCONTINUED | OUTPATIENT
Start: 2024-02-19 | End: 2024-02-26 | Stop reason: HOSPADM

## 2024-02-19 RX ORDER — KETOROLAC TROMETHAMINE 30 MG/ML
30 INJECTION, SOLUTION INTRAMUSCULAR; INTRAVENOUS EVERY 6 HOURS
Status: COMPLETED | OUTPATIENT
Start: 2024-02-19 | End: 2024-02-19

## 2024-02-19 RX ORDER — METHYLERGONOVINE MALEATE 0.2 MG/ML
INJECTION INTRAVENOUS PRN
Status: DISCONTINUED | OUTPATIENT
Start: 2024-02-19 | End: 2024-02-19 | Stop reason: SDUPTHER

## 2024-02-19 RX ORDER — PROPOFOL 10 MG/ML
INJECTION, EMULSION INTRAVENOUS PRN
Status: DISCONTINUED | OUTPATIENT
Start: 2024-02-19 | End: 2024-02-19 | Stop reason: SDUPTHER

## 2024-02-19 RX ORDER — PHENYLEPHRINE HCL IN 0.9% NACL 1 MG/10 ML
SYRINGE (ML) INTRAVENOUS PRN
Status: DISCONTINUED | OUTPATIENT
Start: 2024-02-19 | End: 2024-02-19 | Stop reason: SDUPTHER

## 2024-02-19 RX ORDER — SODIUM CHLORIDE 9 MG/ML
INJECTION, SOLUTION INTRAVENOUS PRN
Status: DISCONTINUED | OUTPATIENT
Start: 2024-02-19 | End: 2024-02-19 | Stop reason: HOSPADM

## 2024-02-19 RX ADMIN — DEXAMETHASONE SODIUM PHOSPHATE 10 MG: 10 INJECTION, SOLUTION INTRAMUSCULAR; INTRAVENOUS at 06:03

## 2024-02-19 RX ADMIN — MORPHINE SULFATE 4 MG: 1 INJECTION, SOLUTION EPIDURAL; INTRATHECAL; INTRAVENOUS at 06:12

## 2024-02-19 RX ADMIN — PROPOFOL 200 MG: 10 INJECTION, EMULSION INTRAVENOUS at 05:46

## 2024-02-19 RX ADMIN — OXYCODONE 10 MG: 5 TABLET ORAL at 20:17

## 2024-02-19 RX ADMIN — HYDROMORPHONE HYDROCHLORIDE 0.5 MG: 1 INJECTION, SOLUTION INTRAMUSCULAR; INTRAVENOUS; SUBCUTANEOUS at 07:26

## 2024-02-19 RX ADMIN — MORPHINE SULFATE 6 MG: 1 INJECTION, SOLUTION EPIDURAL; INTRATHECAL; INTRAVENOUS at 06:02

## 2024-02-19 RX ADMIN — KETOROLAC TROMETHAMINE 30 MG: 30 INJECTION, SOLUTION INTRAMUSCULAR; INTRAVENOUS at 07:29

## 2024-02-19 RX ADMIN — SODIUM CHLORIDE, PRESERVATIVE FREE 10 ML: 5 INJECTION INTRAVENOUS at 20:20

## 2024-02-19 RX ADMIN — HYDROMORPHONE HYDROCHLORIDE 0.5 MG: 1 INJECTION, SOLUTION INTRAMUSCULAR; INTRAVENOUS; SUBCUTANEOUS at 18:33

## 2024-02-19 RX ADMIN — Medication 166.7 ML: at 05:50

## 2024-02-19 RX ADMIN — FENTANYL CITRATE 100 MCG: 50 INJECTION, SOLUTION INTRAMUSCULAR; INTRAVENOUS at 05:56

## 2024-02-19 RX ADMIN — TRANEXAMIC ACID 1000 MG: 100 INJECTION, SOLUTION INTRAVENOUS at 05:58

## 2024-02-19 RX ADMIN — MORPHINE SULFATE 4 MG: 1 INJECTION, SOLUTION EPIDURAL; INTRATHECAL; INTRAVENOUS at 06:14

## 2024-02-19 RX ADMIN — ACETAMINOPHEN 1000 MG: 500 TABLET ORAL at 18:15

## 2024-02-19 RX ADMIN — Medication 100 MCG: at 06:08

## 2024-02-19 RX ADMIN — KETOROLAC TROMETHAMINE 30 MG: 30 INJECTION, SOLUTION INTRAMUSCULAR; INTRAVENOUS at 20:16

## 2024-02-19 RX ADMIN — DOCUSATE SODIUM 100 MG: 100 CAPSULE, LIQUID FILLED ORAL at 20:17

## 2024-02-19 RX ADMIN — MORPHINE SULFATE 5 MG: 1 INJECTION, SOLUTION EPIDURAL; INTRATHECAL; INTRAVENOUS at 06:20

## 2024-02-19 RX ADMIN — SODIUM CHLORIDE, POTASSIUM CHLORIDE, SODIUM LACTATE AND CALCIUM CHLORIDE: 600; 310; 30; 20 INJECTION, SOLUTION INTRAVENOUS at 05:43

## 2024-02-19 RX ADMIN — METHYLERGONOVINE MALEATE 200 MCG: 0.2 INJECTION, SOLUTION INTRAMUSCULAR; INTRAVENOUS at 05:54

## 2024-02-19 RX ADMIN — Medication 100 MG: at 05:46

## 2024-02-19 RX ADMIN — MORPHINE SULFATE 2 MG: 1 INJECTION, SOLUTION EPIDURAL; INTRATHECAL; INTRAVENOUS at 06:05

## 2024-02-19 RX ADMIN — AZITHROMYCIN DIHYDRATE 500 MG: 500 INJECTION, POWDER, LYOPHILIZED, FOR SOLUTION INTRAVENOUS at 06:01

## 2024-02-19 RX ADMIN — SODIUM CHLORIDE, POTASSIUM CHLORIDE, SODIUM LACTATE AND CALCIUM CHLORIDE: 600; 310; 30; 20 INJECTION, SOLUTION INTRAVENOUS at 07:20

## 2024-02-19 RX ADMIN — Medication 2 G: at 05:57

## 2024-02-19 RX ADMIN — KETOROLAC TROMETHAMINE 30 MG: 30 INJECTION, SOLUTION INTRAMUSCULAR; INTRAVENOUS at 13:38

## 2024-02-19 RX ADMIN — SIMETHICONE 80 MG: 80 TABLET, CHEWABLE ORAL at 20:17

## 2024-02-19 RX ADMIN — OXYCODONE 10 MG: 5 TABLET ORAL at 10:35

## 2024-02-19 RX ADMIN — MORPHINE SULFATE 5 MG: 1 INJECTION, SOLUTION EPIDURAL; INTRATHECAL; INTRAVENOUS at 06:25

## 2024-02-19 RX ADMIN — MIDAZOLAM 2 MG: 1 INJECTION INTRAMUSCULAR; INTRAVENOUS at 06:15

## 2024-02-19 RX ADMIN — SODIUM CHLORIDE, POTASSIUM CHLORIDE, SODIUM LACTATE AND CALCIUM CHLORIDE: 600; 310; 30; 20 INJECTION, SOLUTION INTRAVENOUS at 06:56

## 2024-02-19 RX ADMIN — ACETAMINOPHEN 1000 MG: 500 TABLET ORAL at 12:12

## 2024-02-19 RX ADMIN — OXYCODONE 10 MG: 5 TABLET ORAL at 15:10

## 2024-02-19 RX ADMIN — MORPHINE SULFATE 4 MG: 1 INJECTION, SOLUTION EPIDURAL; INTRATHECAL; INTRAVENOUS at 06:01

## 2024-02-19 RX ADMIN — Medication 909 MILLI-UNITS/MIN: at 05:50

## 2024-02-19 RX ADMIN — ONDANSETRON 4 MG: 2 INJECTION INTRAMUSCULAR; INTRAVENOUS at 06:03

## 2024-02-19 ASSESSMENT — PAIN DESCRIPTION - LOCATION
LOCATION: ABDOMEN;INCISION
LOCATION: ABDOMEN
LOCATION: ABDOMEN;INCISION
LOCATION: ABDOMEN;INCISION
LOCATION: ABDOMEN

## 2024-02-19 ASSESSMENT — PAIN SCALES - GENERAL
PAINLEVEL_OUTOF10: 7
PAINLEVEL_OUTOF10: 6
PAINLEVEL_OUTOF10: 8
PAINLEVEL_OUTOF10: 7
PAINLEVEL_OUTOF10: 6
PAINLEVEL_OUTOF10: 8
PAINLEVEL_OUTOF10: 0

## 2024-02-19 ASSESSMENT — PAIN DESCRIPTION - DESCRIPTORS
DESCRIPTORS: ACHING;CRAMPING;DISCOMFORT
DESCRIPTORS: SORE;DISCOMFORT
DESCRIPTORS: SORE;DISCOMFORT
DESCRIPTORS: DISCOMFORT;TENDER

## 2024-02-19 ASSESSMENT — PAIN DESCRIPTION - ORIENTATION
ORIENTATION: MID;LOWER
ORIENTATION: LOWER;MID
ORIENTATION: MID;LOWER
ORIENTATION: MID;LOWER

## 2024-02-19 ASSESSMENT — PAIN - FUNCTIONAL ASSESSMENT: PAIN_FUNCTIONAL_ASSESSMENT: ACTIVITIES ARE NOT PREVENTED

## 2024-02-19 NOTE — OP NOTE
were visualized and appeared normal. The hysterotomy was again inspected and found to be hemostatic. Abdomen was copiously irrigated with Irrisept with one minute waiting period. Rectus muscles were inspected and found to be hemostatic.  The fascia was then reapproximated with running sutures of 0 Vicryl x2 starting from either end and working toward the midline. The subcuticular space was irrigated copiously with irrisept. The skin was reapproximated with a 4-0 Monocryl. The skin was then cleansed and dressed with a ABD with tape in sterile fashion.     KUB performed and negative for retained instruments/sponges. The urine was blood tinged at the end of the case. Don catheter was removed and cystoscopy was performed. Bladder appeared intact with no evidence of injury.  Bilateral ureteral jets were identified.    Ancef and azithromycin were given during the case for antibiotic prophylaxis.  SCDs for DVT prophylaxis remain in place for the post operative period.     Dr. Murphy was present for the entire operation.    Findings:  Viable 8 lb 0 oz female infant in cephalic and direct OP presentation with Apgars of 8 at 1 minute and 9 at five minutes, normal appearing tubes and ovaries. Right sided uterine extension into the broad ligament  Quantitative Blood Loss: 210 mL  Total IV Fluids: 2000 L  Urine output: 50 mL clear urine   Drains:  don catheter  Specimens:  placenta sent to pathology, cord blood, and cord gases  Instrument and Sponge Count: Correct  Complications: none  Condition: Infant stable, transfer to General Care Nursery, Mother stable, transfer to post anesthesia recovery    Irina Escobar MD  OB/GYN Resident  2/19/2024, 8:52 AM

## 2024-02-19 NOTE — SIGNIFICANT EVENT
Obstetric/Gynecology Resident Interval Note    Late entry due to emergent need for delivery via .  Patient presented for contractions that started at midnight.  On arrival by squad, bedside ultrasound confirms cephalic presentation and SVE performed and she was noted to be 6 to 7 cm dilated 90% effaced 0 station.  Patient progressed to make quick cervical change eventually becoming complete/100/0.  Tracing noted to be category 2 with intermittent variable decelerations.  Of note patient does have a history of  x 2 and a history of a successful  after her first  section.      Attempt made to push however patient noted to have minimal fetal descent.  Fetal bradycardia noted in the 90s.  Dr. Murphy called to bedside.  Patient consented for vacuum delivery as fetal station was now +1. Poor maternal effort with pushing and fetal heart tones still noted to be in the 90s.  Patient counseled on continuation with the vacuum assisted delivery versus repeat  section and patient elected to proceed with a repeat STAT  under general anesthesia. Magdi called and patient brought to the OR.  Fetal heart tones noted to be in the 40s.  Continuous fetal monitoring performed until Pelaez catheter placed and anesthesia induced.  Please see op note for further details as it relates to stat .  Additionally refer to H&P for more details as it relates to patient's presentation and prenatal care history.    Irina Escobar MD  OB/GYN Resident, PGY3  [place], [place]  2024, 7:54 AM

## 2024-02-19 NOTE — H&P
OBSTETRICAL HISTORY AND PHYSICAL  [facility]    Date: 2024       Time: 5:01 AM   Patient Name: Deanna Cordova     Patient : 1995  Room/Bed: REC3/[facility]-01    Admission Date/Time: 2024  4:48 AM    CC: contractions     HPI: Deanna Cordova is a 28 y.o.  at Unknown GA reportedly \"9 months pregnant\" who presents to L&D by squad for contractions that started around MN with associated vaginal spotting. The patient reports fetal movement is present, complains of contractions, reports loss of fluid but does not know what time, denies vaginal bleeding. Patient denies headache, vision changes, nausea, vomiting, fever, chills, shortness of breath, chest pain, RUQ pain, abdominal pain, diarrhea, change in color/amount/odor of vaginal discharge, dysuria or, hematuria.     Patient is a poor historian likely due to pain. She reports she found out she was pregnant in December at [facility], but reports no prenatal care.     DATING:  LMP: No LMP recorded. Patient is pregnant.  Estimated Date of Delivery: None noted.   Based on: unknown, patient reports \"9 months pregnant\" - no US in this pregnancy    PREGNANCY RISK FACTORS:  Patient Active Problem List   Diagnosis    Ovarian cyst complicating pregnancy, antepartum    Renal cyst, right    39 weeks gestation of pregnancy      Steroids Given In This Pregnancy:  no     REVIEW OF SYSTEMS:   Constitutional: negative fever, negative chills, negative weight changes   HEENT: negative visual disturbances, negative headaches, negative dizziness, negative hearing loss  Breast: Negative breast abnormalities, negative breast lumps, negative nipple discharge  Respiratory: negative dyspnea, negative cough, negative SOB  Cardiovascular: negative chest pain,  negative palpitations, negative arrhythmia, negative syncope   Gastrointestinal: positive abdominal pain - contractions, negative RUQ pain, negative N/V, negative diarrhea, negative

## 2024-02-19 NOTE — FLOWSHEET NOTE
Pt arrived by EMS with complaints of contractions, spotting, reports positive fetal movement. Taken to Rec3 for assessment. EFM applied 145 baseline with early decelerations. SVE preformed by Dr Escobar finding of 6-7 cm. Discussed V-Back vs C/S Pt opts for V-Back at this time. Taken to 704.

## 2024-02-19 NOTE — BRIEF OP NOTE
Department of Obstetrics and Gynecology  Obstetrical Brief Operative Report  [facility]    Patient: Deanna Cordova   : 1995  MRN: [id number]       Acct: [id number]   Date of Procedure: 24    Pre-operative Diagnosis: 28 y.o. female  at Unknown gestational age  Spontaneous labor  Failed Vacuum assisted vaginal delivery  No prenatal care  History of  Section x2  History of  x1  History of Polysubstance abuse  BMI 21     Post-operative Diagnosis:  living infant female  Spontaneous labor  Failed Vacuum assisted vaginal delivery  No prenatal care  History of  Section x2  History of  x1  History of Polysubstance abuse  BMI 21     Procedure: Stat repeat  section with cystoscopy     Surgeon: Dr. Tabby Murphy MD  Assistant(s): Irina Escobar MD, PGY3, Tressa Hayes DO, PGY1    Anesthesia: general     Information for the patient's :  Brian Cordova [[id number]]   female   Birth Weight: 3.65 kg (8 lb 0.8 oz)   Information for the patient's :  Brian Cordova [[id number]]        Findings:  Live Born 8 lb 0 oz female infant in cephalic presentation with Apgars of 8 at 1 minute and 9 at five minutes, normal appearing uterus tubes and ovaries, bladder appearing intact with small areas of trauma likely from don catheter, bilateral ureteral jets visualized  Estimated Blood Loss: 530 cc immediately post-operatively  Total IV Fluids: 2000 mL  Urine output: 50 cc blood tinged urine   Drains:  don catheter  Specimens:  placenta sent to pathology, cord blood, and cord gases  Instrument and Sponge Count: Correct  Complications: none  Condition: Infant stable, transfer to General Care Nursery, Mother stable, transfer to post anesthesia recovery    See operative report for full details.    Tressa Hayes DO  Ob/Gyn Resident  2024, 7:06 AM

## 2024-02-19 NOTE — ANESTHESIA POSTPROCEDURE EVALUATION
Department of Anesthesiology  Postprocedure Note    Patient: Deanna Cordova  MRN: [id number]  YOB: 1995  Date of evaluation: 2024    Procedure Summary     Date: 24 Room / Location: [facility]&[address]    Anesthesia Start: 542 Anesthesia Stop: 710    Procedures:        SECTION      CYSTOSCOPY (Bladder) Diagnosis:       Previous  section      (Previous  section [Z98.891])    Surgeons: Tabby Murphy MD Responsible Provider: Hansel Nieto MD    Anesthesia Type: general ASA Status: 3 - Emergent          Anesthesia Type: No value filed.    Kyle Phase I: Kyle Score: 10    Kyle Phase II: Kyle Score: 9    Anesthesia Post Evaluation    Patient location during evaluation: bedside  Patient participation: complete - patient participated  Level of consciousness: awake  Airway patency: patent  Nausea & Vomiting: no nausea and no vomiting  Cardiovascular status: blood pressure returned to baseline  Respiratory status: acceptable  Hydration status: euvolemic  Comments: BP (!) 138/92   Pulse 57   Temp 98 °F (36.7 °C) (Oral)   Resp 16   SpO2 97%   Breastfeeding Unknown     Pain management: adequate        No notable events documented.

## 2024-02-19 NOTE — CARE COORDINATION
Social Work     Sw reviewed medical record (current active problem list) and tox screens and found mom's fred for 24 is +Fentanyl, +opiates, +benzos, +cocaine.    Mom was an [facility] with emergent , it appears prior to fred?  Sw unclear what mom was given prior to fred.    Sw discussed mom's drug use during pregnancy with mom.  Mom states she uses Percs from the street, initially mom was vague on how often/how many.  Overall mom states \"maybe 1 per week\".  Mom informed she is +Cocaine and mom denied use, states percs from street and could be laced.       Sw spoke with mom briefly to explain Sw role, inquire if any needs or concerns, and provide safe sleep education and discuss.  Mom denied any needs or questions and informs baby has a safe sleep environment (crib and pnp).     Mom denied any current s/s of anxiety or depression and is aware to reach out to OB if any s/s occur after dc.     Mom reports a good support system with her dad (local) and her grandmother (resides out of state) and denied any current questions or needs.      Mom reports this is her 4th daughter, other 3 are: 8, 7, 6.  Mom reports her daughters are excited for baby.  Mom initially reports she resided in the home with her 3 daughters, later states foaugie ( Jaciel Wong) in the process of moving in also.       Mom states ped will be .     Sw discussed why no pnc, mom states she has 2 appts (1 at pregnancy center, 1 at [facility]).  Mom denied any barriers to future appts for herself or baby, states she has transportation.      Mom denied being linked with any community tx, states she plans to call WIC.  Mom denied ever being linked with AOD services and denied wanting any resources at this time.      Mom aware of BECKA Mandate.    CS referral made to intake (Tressa).    No dc for baby until [facility] relays plan.      Please send cord for toxicology.      Sw encouraged mom to reach out if any issues or concerns arise.

## 2024-02-19 NOTE — FLOWSHEET NOTE
Patient admitted to room 735 from Labor and Delivery via bed.   Oriented to room and surroundings.  Plan of care reviewed.  Verbalized understanding.  Instructed on infant security and safe sleep practices.  Preventing falls education provided .The following handouts given: A New Beginning: Your Guide to Postpartum Care, Rounding, [facility] Security System,Babies Cry A lot, Safe Sleep, Security and Visitation Guidelines.   Call light placed within reach.       During admission RN asked patient about urine tox screen. Explained to aptient some of the positive finding were from medications she got during delivery other were not. Patient stated she took 1 percocet yesterday for back pain. Residents informed, will inform peds during rounds.

## 2024-02-19 NOTE — FLOWSHEET NOTE
Spoke to Damaris in the micro lab, order sent via tube system with urine culture order. Lab to be ran with urine that is in the lab from the UDS.

## 2024-02-19 NOTE — DISCHARGE SUMMARY
Obstetric Discharge Summary  [facility]    Patient Name: Deanna Cordova  Patient : 1995  Primary Care Physician: Pinky Delvalle DO  Admit Date: 2024    Principal Diagnosis: IUP at Unknown, admitted for spontaneous labor     Her pregnancy has been complicated by:   Patient Active Problem List   Diagnosis    Renal cyst, right    RLTCS 24 F Apg 8/9 Wt 8#0    Hx CS x3    Preeclampsia w/o SF (see overview below)    Benzodiazepine withdrawal (HCC)    Post-operative state     Infection Present?: No  Hospital Acquired: No    Surgical Operations & Procedures:  Analgesia: general  Delivery Type:  Delivery: low transverse   Laceration(s): Absent    Consultations: NICU and Anesthesia    Pertinent Findings & Procedures:   Deanna Cordova is a 28 y.o. female  at unknown gestational age admitted for spontaneous labor; became complete, FHR were noted in the 40s-80s with a failed vaccuum, decision was made to call LAURE and proceed to the OR for stat repeat  section    She delivered by repeat low transverse  a Live Born infant on 24.       Information for the patient's :  Brian Cordova [[id number]]   female   Birth Weight: 3.65 kg (8 lb 0.8 oz)     Apgars: 8 at 1 minute and 9 at 5 minutes.     ERAS for  Section  Received ERAS education outpatient: No  Consumed electrolyte-rich clear fluid prior to surgery: No  Received pre-operative Tylenol and Pepcid: No  Received Scopolamine patch: No  Received post-operative scheduled Motrin and Tylenol: Yes  Pelaez catheter discontinued 12 hours post-operatively: No        ERAS requirements met (3/6): No    Postpartum course:   POD#0: Patient began to have elevated blood pressures, PreE labs wnl, P/C 0.54. Patient UDS noted to be positive for fentanyl, cocaine, opiates, and Benzos.  POD#1: Hgb was 6.9. Patient declined blood. Received Venofer x1  POD#2: Hgb 6.8, patient accepted 1 Unit pRBC, however;

## 2024-02-19 NOTE — CARE COORDINATION
CASE MANAGEMENT POST-PARTUM TRANSITIONAL CARE PLAN    39 weeks gestation of pregnancy [Z3A.39]    OB Provider: None- No PNC    Writer met w/ Deanna at her bedside to discuss DCP. She is S/P CS on 24 at unknown gestational age of female infant @ 0549    Writer verified address, updated phone number correct on facesheet. She states she lives with her 3 other children ages 8,7,6. She stated FOB is Jaciel Wong but they don't live together. She denied barriers with transportation home, to doctors appointments or with paying for medications upon discharge home.     [facility] [place] Medicaid insurance . Writer notified Deanna she has 30 days from date of birth to add  to insurance policy by contacting [facility]. She verbalized understanding.    She confirmed a safe place for infant to sleep at home.   Infant name on BC: Undecided.   Infant PCP [facility] Rosita Duff-Dr. Hogue.     DME: none  HOME CARE: none    Anticipate DC home in 2-4 days status post C/S.    Readmission Risk              Risk of Unplanned Readmission:  5

## 2024-02-20 LAB
ERYTHROCYTE [DISTWIDTH] IN BLOOD BY AUTOMATED COUNT: 15.2 % (ref 11.8–14.4)
HCT VFR BLD AUTO: 23.4 % (ref 36.3–47.1)
HGB BLD-MCNC: 6.9 G/DL (ref 11.9–15.1)
MCH RBC QN AUTO: 21.7 PG (ref 25.2–33.5)
MCHC RBC AUTO-ENTMCNC: 29.5 G/DL (ref 28.4–34.8)
MCV RBC AUTO: 73.6 FL (ref 82.6–102.9)
MICROORGANISM SPEC CULT: NO GROWTH
NRBC BLD-RTO: 0 PER 100 WBC
PLATELET # BLD AUTO: 283 K/UL (ref 138–453)
PMV BLD AUTO: 10.9 FL (ref 8.1–13.5)
RBC # BLD AUTO: 3.18 M/UL (ref 3.95–5.11)
SPECIMEN DESCRIPTION: NORMAL
WBC OTHER # BLD: 10.1 K/UL (ref 3.5–11.3)

## 2024-02-20 PROCEDURE — 6370000000 HC RX 637 (ALT 250 FOR IP)

## 2024-02-20 PROCEDURE — 2580000003 HC RX 258: Performed by: STUDENT IN AN ORGANIZED HEALTH CARE EDUCATION/TRAINING PROGRAM

## 2024-02-20 PROCEDURE — 6360000002 HC RX W HCPCS: Performed by: STUDENT IN AN ORGANIZED HEALTH CARE EDUCATION/TRAINING PROGRAM

## 2024-02-20 PROCEDURE — 1220000000 HC SEMI PRIVATE OB R&B

## 2024-02-20 PROCEDURE — 36415 COLL VENOUS BLD VENIPUNCTURE: CPT

## 2024-02-20 PROCEDURE — 85027 COMPLETE CBC AUTOMATED: CPT

## 2024-02-20 RX ORDER — LIDOCAINE 4 G/G
1 PATCH TOPICAL DAILY
Status: DISCONTINUED | OUTPATIENT
Start: 2024-02-20 | End: 2024-02-26 | Stop reason: HOSPADM

## 2024-02-20 RX ADMIN — DOCUSATE SODIUM 100 MG: 100 CAPSULE, LIQUID FILLED ORAL at 09:37

## 2024-02-20 RX ADMIN — CEPHALEXIN 500 MG: 500 CAPSULE ORAL at 01:26

## 2024-02-20 RX ADMIN — IBUPROFEN 600 MG: 600 TABLET, FILM COATED ORAL at 13:45

## 2024-02-20 RX ADMIN — ACETAMINOPHEN 1000 MG: 500 TABLET ORAL at 15:26

## 2024-02-20 RX ADMIN — CEPHALEXIN 500 MG: 500 CAPSULE ORAL at 15:33

## 2024-02-20 RX ADMIN — OXYCODONE 10 MG: 5 TABLET ORAL at 01:26

## 2024-02-20 RX ADMIN — IBUPROFEN 600 MG: 600 TABLET, FILM COATED ORAL at 19:45

## 2024-02-20 RX ADMIN — IRON SUCROSE 300 MG: 20 INJECTION, SOLUTION INTRAVENOUS at 15:18

## 2024-02-20 RX ADMIN — OXYCODONE 10 MG: 5 TABLET ORAL at 13:45

## 2024-02-20 RX ADMIN — IBUPROFEN 600 MG: 600 TABLET, FILM COATED ORAL at 05:20

## 2024-02-20 RX ADMIN — ACETAMINOPHEN 1000 MG: 500 TABLET ORAL at 01:27

## 2024-02-20 RX ADMIN — METRONIDAZOLE 500 MG: 500 TABLET ORAL at 15:33

## 2024-02-20 RX ADMIN — OXYCODONE 10 MG: 5 TABLET ORAL at 05:20

## 2024-02-20 RX ADMIN — OXYCODONE 5 MG: 5 TABLET ORAL at 09:37

## 2024-02-20 RX ADMIN — METRONIDAZOLE 500 MG: 500 TABLET ORAL at 01:26

## 2024-02-20 RX ADMIN — OXYCODONE 10 MG: 5 TABLET ORAL at 19:45

## 2024-02-20 RX ADMIN — ACETAMINOPHEN 1000 MG: 500 TABLET ORAL at 09:37

## 2024-02-20 ASSESSMENT — PAIN DESCRIPTION - DESCRIPTORS
DESCRIPTORS: SORE
DESCRIPTORS: DISCOMFORT
DESCRIPTORS: CRAMPING;SORE

## 2024-02-20 ASSESSMENT — PAIN DESCRIPTION - LOCATION
LOCATION: ABDOMEN;BACK
LOCATION: ABDOMEN;INCISION;BACK
LOCATION: ABDOMEN;INCISION
LOCATION: ABDOMEN
LOCATION: ABDOMEN;INCISION

## 2024-02-20 ASSESSMENT — PAIN SCALES - GENERAL
PAINLEVEL_OUTOF10: 0
PAINLEVEL_OUTOF10: 7
PAINLEVEL_OUTOF10: 5
PAINLEVEL_OUTOF10: 7
PAINLEVEL_OUTOF10: 7

## 2024-02-20 ASSESSMENT — PAIN DESCRIPTION - ORIENTATION
ORIENTATION: LOWER;MID
ORIENTATION: MID;LOWER
ORIENTATION: MID;LOWER

## 2024-02-20 ASSESSMENT — PAIN - FUNCTIONAL ASSESSMENT: PAIN_FUNCTIONAL_ASSESSMENT: ACTIVITIES ARE NOT PREVENTED

## 2024-02-20 NOTE — CARE COORDINATION
Social Work    Care Everywhere shows mom has history or longterm opiate use- notes from ED in [place] February of 2023 show she was in ED for withdrawal and refused tx, just wanted to get home to [place], where she had more drugs (unprescribed).      [facility] CW updated.    Case will be staffed at [facility] this week.

## 2024-02-20 NOTE — CARE COORDINATION
Social Work    Sw informed via nurse that baby is going to NICU due to high VICTORIA scores.      Sw checked in with mom.    Sw asked how mom was feeling and if she was also going through withdrawal. Mom states not yet, at this time mom continues to deny wanting resources for AOD tx.  Mom aware to reach out to Sw if she would like resources.      Mom encouraged to be in NICU often, as nurse reports that baby is much calmer when mom holding/soothing/bonding.

## 2024-02-20 NOTE — CARE COORDINATION
Social Work    Per [facility] ([facility]), pt was given fentanyl on 2/19/24 at 7:15am, and was given dilaudid (opiate) on 2/19/24 at 7:30am-  these were before the drug screen which was collected at 7:47am 2/19/24.    [facility] Pharmacy does not see any benzos given.      Mom continues to state she took percs from the street, denied cocaine use, unsure what pills were laced with.      Baby's urine drug screen + cocaine, +Fentanyl.    Per NICU nurse, baby's scores since transfer to NICU this morning was a 10, methadone was started.    Micha Sams updated on above.     Cw to see mom's home and other children tomorrow afternoon.    No known dc plan as of now.

## 2024-02-20 NOTE — CARE COORDINATION
Social Work    [facility] Cw is Helen Sams.      Cw confirms no hx with [facility] accept minor calls in the past.  Her mom did pass away from drug OD.      Cw to meet with mom and keep Sw updated on dc plan.      Cw updated that baby is now in NICU.

## 2024-02-20 NOTE — FLOWSHEET NOTE
Critical hbg of 6.9 reported to Dr. Duron and Dr. Park. Will call MD when patient is on unit for blood consent.

## 2024-02-21 LAB
HCT VFR BLD AUTO: 22.2 % (ref 36.3–47.1)
HGB BLD-MCNC: 6.8 G/DL (ref 11.9–15.1)
SURGICAL PATHOLOGY REPORT: NORMAL

## 2024-02-21 PROCEDURE — 36415 COLL VENOUS BLD VENIPUNCTURE: CPT

## 2024-02-21 PROCEDURE — 85018 HEMOGLOBIN: CPT

## 2024-02-21 PROCEDURE — 6370000000 HC RX 637 (ALT 250 FOR IP)

## 2024-02-21 PROCEDURE — 85014 HEMATOCRIT: CPT

## 2024-02-21 PROCEDURE — 1220000000 HC SEMI PRIVATE OB R&B

## 2024-02-21 RX ORDER — GABAPENTIN 300 MG/1
600 CAPSULE ORAL EVERY 8 HOURS
Status: DISCONTINUED | OUTPATIENT
Start: 2024-02-23 | End: 2024-02-21

## 2024-02-21 RX ORDER — SODIUM CHLORIDE 9 MG/ML
INJECTION, SOLUTION INTRAVENOUS PRN
Status: DISCONTINUED | OUTPATIENT
Start: 2024-02-21 | End: 2024-02-26 | Stop reason: HOSPADM

## 2024-02-21 RX ORDER — LORAZEPAM 2 MG/ML
1 INJECTION INTRAMUSCULAR EVERY 4 HOURS PRN
Status: DISCONTINUED | OUTPATIENT
Start: 2024-02-21 | End: 2024-02-22

## 2024-02-21 RX ORDER — GABAPENTIN 800 MG/1
800 TABLET ORAL ONCE
Status: COMPLETED | OUTPATIENT
Start: 2024-02-21 | End: 2024-02-21

## 2024-02-21 RX ORDER — GABAPENTIN 300 MG/1
600 CAPSULE ORAL EVERY 8 HOURS
Status: DISCONTINUED | OUTPATIENT
Start: 2024-02-23 | End: 2024-02-22

## 2024-02-21 RX ADMIN — SIMETHICONE 80 MG: 80 TABLET, CHEWABLE ORAL at 01:03

## 2024-02-21 RX ADMIN — CEPHALEXIN 500 MG: 500 CAPSULE ORAL at 22:06

## 2024-02-21 RX ADMIN — IBUPROFEN 600 MG: 600 TABLET, FILM COATED ORAL at 09:05

## 2024-02-21 RX ADMIN — DOCUSATE SODIUM 100 MG: 100 CAPSULE, LIQUID FILLED ORAL at 01:04

## 2024-02-21 RX ADMIN — METRONIDAZOLE 500 MG: 500 TABLET ORAL at 13:39

## 2024-02-21 RX ADMIN — METRONIDAZOLE 500 MG: 500 TABLET ORAL at 01:03

## 2024-02-21 RX ADMIN — OXYCODONE 10 MG: 5 TABLET ORAL at 10:35

## 2024-02-21 RX ADMIN — Medication 1 TABLET: at 09:05

## 2024-02-21 RX ADMIN — OXYCODONE 10 MG: 5 TABLET ORAL at 22:55

## 2024-02-21 RX ADMIN — ACETAMINOPHEN 1000 MG: 500 TABLET ORAL at 01:03

## 2024-02-21 RX ADMIN — IBUPROFEN 600 MG: 600 TABLET, FILM COATED ORAL at 13:39

## 2024-02-21 RX ADMIN — METRONIDAZOLE 500 MG: 500 TABLET ORAL at 22:06

## 2024-02-21 RX ADMIN — ACETAMINOPHEN 1000 MG: 500 TABLET ORAL at 13:39

## 2024-02-21 RX ADMIN — OXYCODONE 10 MG: 5 TABLET ORAL at 14:32

## 2024-02-21 RX ADMIN — OXYCODONE 10 MG: 5 TABLET ORAL at 18:33

## 2024-02-21 RX ADMIN — CEPHALEXIN 500 MG: 500 CAPSULE ORAL at 13:39

## 2024-02-21 RX ADMIN — GABAPENTIN 800 MG: 800 TABLET ORAL at 22:06

## 2024-02-21 RX ADMIN — ACETAMINOPHEN 1000 MG: 500 TABLET ORAL at 19:47

## 2024-02-21 RX ADMIN — CEPHALEXIN 500 MG: 500 CAPSULE ORAL at 05:39

## 2024-02-21 RX ADMIN — METRONIDAZOLE 500 MG: 500 TABLET ORAL at 05:39

## 2024-02-21 RX ADMIN — DOCUSATE SODIUM 100 MG: 100 CAPSULE, LIQUID FILLED ORAL at 09:05

## 2024-02-21 RX ADMIN — OXYCODONE 10 MG: 5 TABLET ORAL at 05:35

## 2024-02-21 RX ADMIN — ONDANSETRON 4 MG: 4 TABLET, ORALLY DISINTEGRATING ORAL at 22:11

## 2024-02-21 RX ADMIN — CEPHALEXIN 500 MG: 500 CAPSULE ORAL at 01:04

## 2024-02-21 RX ADMIN — OXYCODONE 10 MG: 5 TABLET ORAL at 01:03

## 2024-02-21 RX ADMIN — IBUPROFEN 600 MG: 600 TABLET, FILM COATED ORAL at 19:46

## 2024-02-21 RX ADMIN — IBUPROFEN 600 MG: 600 TABLET, FILM COATED ORAL at 05:34

## 2024-02-21 RX ADMIN — ACETAMINOPHEN 1000 MG: 500 TABLET ORAL at 09:03

## 2024-02-21 ASSESSMENT — PAIN SCALES - GENERAL
PAINLEVEL_OUTOF10: 3
PAINLEVEL_OUTOF10: 8
PAINLEVEL_OUTOF10: 7
PAINLEVEL_OUTOF10: 7
PAINLEVEL_OUTOF10: 0
PAINLEVEL_OUTOF10: 7
PAINLEVEL_OUTOF10: 8

## 2024-02-21 ASSESSMENT — PAIN DESCRIPTION - LOCATION
LOCATION: INCISION;BACK
LOCATION: INCISION
LOCATION: ABDOMEN
LOCATION: INCISION;BACK

## 2024-02-21 ASSESSMENT — PAIN DESCRIPTION - ORIENTATION
ORIENTATION: MID;LOWER
ORIENTATION: LOWER

## 2024-02-21 ASSESSMENT — PAIN - FUNCTIONAL ASSESSMENT
PAIN_FUNCTIONAL_ASSESSMENT: ACTIVITIES ARE NOT PREVENTED

## 2024-02-21 ASSESSMENT — PAIN DESCRIPTION - DESCRIPTORS
DESCRIPTORS: DISCOMFORT
DESCRIPTORS: SORE;TENDER
DESCRIPTORS: ACHING;DULL;DISCOMFORT
DESCRIPTORS: ACHING;DULL;BURNING

## 2024-02-21 NOTE — PLAN OF CARE
Problem: Vaginal Birth or  Section  Goal: Fetal and maternal status remain reassuring during the birth process  Description:  Birth OB-Pregnancy care plan goal which identifies if the fetal and maternal status remain reassuring during the birth process  Outcome: Completed

## 2024-02-21 NOTE — CARE COORDINATION
Social Work    Micha met with mom, who states she does feel she is withdrawing, and would like to be treated via medication.  Nurse informed and followed up with .      Micha discussed [facility] and OP AOD resources with mom.      CSB also called and spoke with mom and MICHA.      CSB went to moms home to see her children, and man answered the door, states pt does not live there.  Pt continues to state she does live there, but her children are currently with her dad (address unknown, pt to obtain from her dad tonight and inform [facility] of his address).      Staffing postponed from Tomorrow till Monday (baby on methadone q4, no dc date known), this will give CW time to meet children, sort housing details out and work on overall plan.     Mom agreeable to cooperate with [facility] on any needs, including going to [facility] if that will allow her to keep her baby.  Mom states father of other 3 kids very supportive and she would like him to have the 3 other children (he is not father if this child), while she is in treatment with child.      Micha to help mom call [facility] tomorrow for intake.      Mom admits her addiction history is more extensive than she has reported.  **PLEASE SEND CORD FOR TOXICOLOGY**  this will be important for CS.      Final Dc plan for baby will not be known until after staffing Monday.

## 2024-02-21 NOTE — FLOWSHEET NOTE
Patient continues to state she wants to hold off on transfusion until after back from NICU. Will inform RN when she is ready

## 2024-02-21 NOTE — FLOWSHEET NOTE
in to discuss at length treatment options for rehab at [facility] and CSB involvement. Patient understands all and denies questions. Wants to go to rehab. States she is withdrawing. COWS assessment done and residents notified

## 2024-02-21 NOTE — CONSENT
I have discussed with the patient the rationale for blood component transfusion; its benefits in treating or preventing fatigue, organ damage, or death; and its risk which includes mild transfusion reactions, rare risk of blood borne infection, or more serious but rare reactions. I have discussed the alternatives to transfusion, including the risk and consequences of not receiving transfusion. The patient had an opportunity to ask questions and had agreed to proceed with transfusion of blood components.    Paris Park DO  OB/GYN Resident  [facility]  2/21/2024 9:47 AM

## 2024-02-21 NOTE — PLAN OF CARE
Problem: Pain  Goal: Verbalizes/displays adequate comfort level or baseline comfort level  Outcome: Progressing     Problem: Postpartum  Goal: Experiences normal postpartum course  Description:  Postpartum OB-Pregnancy care plan goal which identifies if the mother is experiencing a normal postpartum course  Outcome: Progressing  Goal: Appropriate maternal -  bonding  Description:  Postpartum OB-Pregnancy care plan goal which identifies if the mother and  are bonding appropriately  Outcome: Progressing  Goal: Establishment of infant feeding pattern  Description:  Postpartum OB-Pregnancy care plan goal which identifies if the mother is establishing a feeding pattern with their   Outcome: Progressing  Goal: Incisions, wounds, or drain sites healing without S/S of infection  Outcome: Progressing     Problem: Infection - Adult  Goal: Absence of infection at discharge  Outcome: Progressing  Goal: Absence of infection during hospitalization  Outcome: Progressing  Goal: Absence of fever/infection during anticipated neutropenic period  Outcome: Progressing     Problem: Safety - Adult  Goal: Free from fall injury  Outcome: Progressing     Problem: Discharge Planning  Goal: Discharge to home or other facility with appropriate resources  Outcome: Progressing

## 2024-02-22 PROBLEM — O14.90 PREECLAMPSIA: Status: ACTIVE | Noted: 2024-02-22

## 2024-02-22 PROBLEM — F13.939 BENZODIAZEPINE WITHDRAWAL (HCC): Status: ACTIVE | Noted: 2024-02-22

## 2024-02-22 LAB
HCT VFR BLD AUTO: 27.1 % (ref 36.3–47.1)
HGB BLD-MCNC: 8 G/DL (ref 11.9–15.1)
MICROORGANISM SPEC CULT: NORMAL
SPECIMEN DESCRIPTION: NORMAL

## 2024-02-22 PROCEDURE — 6370000000 HC RX 637 (ALT 250 FOR IP)

## 2024-02-22 PROCEDURE — 2580000003 HC RX 258

## 2024-02-22 PROCEDURE — 1220000000 HC SEMI PRIVATE OB R&B

## 2024-02-22 PROCEDURE — 85018 HEMOGLOBIN: CPT

## 2024-02-22 PROCEDURE — 36415 COLL VENOUS BLD VENIPUNCTURE: CPT

## 2024-02-22 PROCEDURE — 85014 HEMATOCRIT: CPT

## 2024-02-22 PROCEDURE — 36430 TRANSFUSION BLD/BLD COMPNT: CPT

## 2024-02-22 PROCEDURE — P9016 RBC LEUKOCYTES REDUCED: HCPCS

## 2024-02-22 RX ORDER — HYDROXYZINE HYDROCHLORIDE 25 MG/1
25 TABLET, FILM COATED ORAL EVERY 6 HOURS PRN
Qty: 16 TABLET | Refills: 0 | Status: SHIPPED | OUTPATIENT
Start: 2024-02-22 | End: 2024-02-26

## 2024-02-22 RX ORDER — GABAPENTIN 300 MG/1
600 CAPSULE ORAL EVERY 8 HOURS
Status: DISCONTINUED | OUTPATIENT
Start: 2024-02-22 | End: 2024-02-23

## 2024-02-22 RX ORDER — CYCLOBENZAPRINE HCL 10 MG
10 TABLET ORAL 3 TIMES DAILY PRN
Status: DISCONTINUED | OUTPATIENT
Start: 2024-02-22 | End: 2024-02-26 | Stop reason: HOSPADM

## 2024-02-22 RX ORDER — LOPERAMIDE HYDROCHLORIDE 2 MG/1
2 CAPSULE ORAL 4 TIMES DAILY PRN
Status: DISCONTINUED | OUTPATIENT
Start: 2024-02-22 | End: 2024-02-26 | Stop reason: HOSPADM

## 2024-02-22 RX ORDER — LORAZEPAM 2 MG/ML
1 INJECTION INTRAMUSCULAR ONCE
Status: COMPLETED | OUTPATIENT
Start: 2024-02-23 | End: 2024-02-23

## 2024-02-22 RX ORDER — GABAPENTIN 100 MG/1
CAPSULE ORAL
Qty: 148 CAPSULE | Refills: 0 | Status: SHIPPED | OUTPATIENT
Start: 2024-02-22 | End: 2024-02-23

## 2024-02-22 RX ORDER — DICYCLOMINE HYDROCHLORIDE 10 MG/1
20 CAPSULE ORAL EVERY 6 HOURS PRN
Status: DISCONTINUED | OUTPATIENT
Start: 2024-02-22 | End: 2024-02-26 | Stop reason: HOSPADM

## 2024-02-22 RX ORDER — HYDROXYZINE HYDROCHLORIDE 25 MG/1
25 TABLET, FILM COATED ORAL 3 TIMES DAILY PRN
Status: DISCONTINUED | OUTPATIENT
Start: 2024-02-22 | End: 2024-02-26 | Stop reason: HOSPADM

## 2024-02-22 RX ADMIN — ACETAMINOPHEN 1000 MG: 500 TABLET ORAL at 02:25

## 2024-02-22 RX ADMIN — SIMETHICONE 80 MG: 80 TABLET, CHEWABLE ORAL at 08:56

## 2024-02-22 RX ADMIN — HYDROXYZINE HYDROCHLORIDE 25 MG: 25 TABLET, FILM COATED ORAL at 18:22

## 2024-02-22 RX ADMIN — OXYCODONE 10 MG: 5 TABLET ORAL at 17:00

## 2024-02-22 RX ADMIN — ACETAMINOPHEN 1000 MG: 500 TABLET ORAL at 15:02

## 2024-02-22 RX ADMIN — ONDANSETRON 4 MG: 4 TABLET, ORALLY DISINTEGRATING ORAL at 08:56

## 2024-02-22 RX ADMIN — OXYCODONE 10 MG: 5 TABLET ORAL at 13:03

## 2024-02-22 RX ADMIN — SODIUM CHLORIDE, PRESERVATIVE FREE 10 ML: 5 INJECTION INTRAVENOUS at 09:11

## 2024-02-22 RX ADMIN — SODIUM CHLORIDE, PRESERVATIVE FREE 10 ML: 5 INJECTION INTRAVENOUS at 21:04

## 2024-02-22 RX ADMIN — OXYCODONE 10 MG: 5 TABLET ORAL at 21:04

## 2024-02-22 RX ADMIN — IBUPROFEN 600 MG: 600 TABLET, FILM COATED ORAL at 21:04

## 2024-02-22 RX ADMIN — CYCLOBENZAPRINE 10 MG: 10 TABLET, FILM COATED ORAL at 18:22

## 2024-02-22 RX ADMIN — CYCLOBENZAPRINE 10 MG: 10 TABLET, FILM COATED ORAL at 04:32

## 2024-02-22 RX ADMIN — Medication 1 TABLET: at 08:56

## 2024-02-22 RX ADMIN — ACETAMINOPHEN 1000 MG: 500 TABLET ORAL at 08:56

## 2024-02-22 RX ADMIN — OXYCODONE 10 MG: 5 TABLET ORAL at 03:23

## 2024-02-22 RX ADMIN — HYDROXYZINE HYDROCHLORIDE 25 MG: 25 TABLET, FILM COATED ORAL at 10:29

## 2024-02-22 RX ADMIN — HYDROXYZINE HYDROCHLORIDE 25 MG: 25 TABLET, FILM COATED ORAL at 04:32

## 2024-02-22 RX ADMIN — IBUPROFEN 600 MG: 600 TABLET, FILM COATED ORAL at 02:25

## 2024-02-22 RX ADMIN — CYCLOBENZAPRINE 10 MG: 10 TABLET, FILM COATED ORAL at 10:29

## 2024-02-22 RX ADMIN — GABAPENTIN 600 MG: 300 CAPSULE ORAL at 09:05

## 2024-02-22 RX ADMIN — IBUPROFEN 600 MG: 600 TABLET, FILM COATED ORAL at 15:02

## 2024-02-22 RX ADMIN — ACETAMINOPHEN 1000 MG: 500 TABLET ORAL at 21:03

## 2024-02-22 RX ADMIN — OXYCODONE 10 MG: 5 TABLET ORAL at 08:56

## 2024-02-22 RX ADMIN — DOCUSATE SODIUM 100 MG: 100 CAPSULE, LIQUID FILLED ORAL at 08:56

## 2024-02-22 RX ADMIN — GABAPENTIN 600 MG: 300 CAPSULE ORAL at 17:00

## 2024-02-22 RX ADMIN — IBUPROFEN 600 MG: 600 TABLET, FILM COATED ORAL at 08:56

## 2024-02-22 ASSESSMENT — PAIN SCALES - GENERAL
PAINLEVEL_OUTOF10: 7
PAINLEVEL_OUTOF10: 5
PAINLEVEL_OUTOF10: 8
PAINLEVEL_OUTOF10: 7
PAINLEVEL_OUTOF10: 6
PAINLEVEL_OUTOF10: 5

## 2024-02-22 ASSESSMENT — PAIN DESCRIPTION - LOCATION
LOCATION: GENERALIZED
LOCATION: ABDOMEN
LOCATION: ABDOMEN
LOCATION: BACK
LOCATION: ABDOMEN;INCISION;BACK
LOCATION: GENERALIZED
LOCATION: GENERALIZED

## 2024-02-22 ASSESSMENT — PAIN DESCRIPTION - ORIENTATION
ORIENTATION: LOWER

## 2024-02-22 ASSESSMENT — PAIN DESCRIPTION - DESCRIPTORS
DESCRIPTORS: ACHING
DESCRIPTORS: ACHING
DESCRIPTORS: ACHING;DULL
DESCRIPTORS: ACHING;DULL
DESCRIPTORS: ACHING
DESCRIPTORS: ACHING
DESCRIPTORS: ACHING;DULL;DISCOMFORT
DESCRIPTORS: ACHING;DISCOMFORT
DESCRIPTORS: SORE

## 2024-02-22 ASSESSMENT — PAIN - FUNCTIONAL ASSESSMENT
PAIN_FUNCTIONAL_ASSESSMENT: ACTIVITIES ARE NOT PREVENTED
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
## 2024-02-22 NOTE — CARE COORDINATION
Social Work     Amparo from [facility] called.      [facility] Intake Completed.      [facility] asking that mom be ready for dc by 10am tomorrow (2/23/24).  [facility] employee will come to 7th floor, obtain mom's scripts (staff aware mom needs all scripts to last through Monday at 11am), [facility] will obtain scripts needed after 11am Monday and moving forward.    [facility] will bring mom to NICU to visit for sevaral hours daily.     Micha updated mom and resident of plan.      Contact Micha if any needs arise.

## 2024-02-23 LAB
ABO/RH: NORMAL
ANTIBODY SCREEN: NEGATIVE
ARM BAND NUMBER: NORMAL
BLOOD BANK BLOOD PRODUCT EXPIRATION DATE: NORMAL
BLOOD BANK DISPENSE STATUS: NORMAL
BLOOD BANK ISBT PRODUCT BLOOD TYPE: 6200
BLOOD BANK PRODUCT CODE: NORMAL
BLOOD BANK SAMPLE EXPIRATION: NORMAL
BLOOD BANK UNIT TYPE AND RH: NORMAL
BPU ID: NORMAL
COMPONENT: NORMAL
CROSSMATCH RESULT: NORMAL
TRANSFUSION STATUS: NORMAL
UNIT DIVISION: 0
UNIT ISSUE DATE/TIME: NORMAL

## 2024-02-23 PROCEDURE — 6360000002 HC RX W HCPCS

## 2024-02-23 PROCEDURE — 6370000000 HC RX 637 (ALT 250 FOR IP): Performed by: STUDENT IN AN ORGANIZED HEALTH CARE EDUCATION/TRAINING PROGRAM

## 2024-02-23 PROCEDURE — 6370000000 HC RX 637 (ALT 250 FOR IP)

## 2024-02-23 PROCEDURE — 2580000003 HC RX 258

## 2024-02-23 PROCEDURE — 1220000000 HC SEMI PRIVATE OB R&B

## 2024-02-23 RX ORDER — GABAPENTIN 300 MG/1
600 CAPSULE ORAL EVERY 6 HOURS
Status: DISCONTINUED | OUTPATIENT
Start: 2024-02-23 | End: 2024-02-23

## 2024-02-23 RX ORDER — GABAPENTIN 100 MG/1
CAPSULE ORAL
Qty: 130 CAPSULE | Refills: 0 | Status: SHIPPED | OUTPATIENT
Start: 2024-02-23 | End: 2024-02-26

## 2024-02-23 RX ORDER — LORAZEPAM 0.5 MG/1
TABLET ORAL
Qty: 6 TABLET | Refills: 0 | Status: SHIPPED | OUTPATIENT
Start: 2024-02-23 | End: 2024-02-26

## 2024-02-23 RX ORDER — LORAZEPAM 1 MG/1
1 TABLET ORAL ONCE
Status: COMPLETED | OUTPATIENT
Start: 2024-02-23 | End: 2024-02-23

## 2024-02-23 RX ORDER — GABAPENTIN 300 MG/1
600 CAPSULE ORAL EVERY 12 HOURS
Status: COMPLETED | OUTPATIENT
Start: 2024-02-24 | End: 2024-02-24

## 2024-02-23 RX ORDER — GABAPENTIN 100 MG/1
500 CAPSULE ORAL EVERY 12 HOURS
Status: COMPLETED | OUTPATIENT
Start: 2024-02-24 | End: 2024-02-24

## 2024-02-23 RX ORDER — LORAZEPAM 2 MG/ML
1 INJECTION INTRAMUSCULAR ONCE
Status: COMPLETED | OUTPATIENT
Start: 2024-02-23 | End: 2024-02-23

## 2024-02-23 RX ADMIN — HYDROXYZINE HYDROCHLORIDE 25 MG: 25 TABLET, FILM COATED ORAL at 12:26

## 2024-02-23 RX ADMIN — OXYCODONE 10 MG: 5 TABLET ORAL at 06:39

## 2024-02-23 RX ADMIN — OXYCODONE 10 MG: 5 TABLET ORAL at 01:37

## 2024-02-23 RX ADMIN — LORAZEPAM 1 MG: 2 INJECTION INTRAMUSCULAR; INTRAVENOUS at 00:05

## 2024-02-23 RX ADMIN — LORAZEPAM 1 MG: 2 INJECTION INTRAMUSCULAR; INTRAVENOUS at 05:29

## 2024-02-23 RX ADMIN — ACETAMINOPHEN 1000 MG: 500 TABLET ORAL at 10:42

## 2024-02-23 RX ADMIN — OXYCODONE 10 MG: 5 TABLET ORAL at 10:42

## 2024-02-23 RX ADMIN — SODIUM CHLORIDE, PRESERVATIVE FREE 10 ML: 5 INJECTION INTRAVENOUS at 20:06

## 2024-02-23 RX ADMIN — GABAPENTIN 600 MG: 300 CAPSULE ORAL at 01:38

## 2024-02-23 RX ADMIN — IBUPROFEN 600 MG: 600 TABLET, FILM COATED ORAL at 20:04

## 2024-02-23 RX ADMIN — GABAPENTIN 600 MG: 300 CAPSULE ORAL at 08:00

## 2024-02-23 RX ADMIN — ACETAMINOPHEN 1000 MG: 500 TABLET ORAL at 20:03

## 2024-02-23 RX ADMIN — CYCLOBENZAPRINE 10 MG: 10 TABLET, FILM COATED ORAL at 12:26

## 2024-02-23 RX ADMIN — IBUPROFEN 600 MG: 600 TABLET, FILM COATED ORAL at 12:33

## 2024-02-23 RX ADMIN — IBUPROFEN 600 MG: 600 TABLET, FILM COATED ORAL at 04:32

## 2024-02-23 RX ADMIN — HYDROXYZINE HYDROCHLORIDE 25 MG: 25 TABLET, FILM COATED ORAL at 04:32

## 2024-02-23 RX ADMIN — LORAZEPAM 1 MG: 1 TABLET ORAL at 09:17

## 2024-02-23 RX ADMIN — OXYCODONE 10 MG: 5 TABLET ORAL at 20:05

## 2024-02-23 RX ADMIN — GABAPENTIN 600 MG: 300 CAPSULE ORAL at 14:08

## 2024-02-23 RX ADMIN — CYCLOBENZAPRINE 10 MG: 10 TABLET, FILM COATED ORAL at 04:32

## 2024-02-23 RX ADMIN — ACETAMINOPHEN 1000 MG: 500 TABLET ORAL at 04:32

## 2024-02-23 ASSESSMENT — PAIN DESCRIPTION - ORIENTATION
ORIENTATION: LOWER
ORIENTATION: MID;LOWER
ORIENTATION: LOWER;MID

## 2024-02-23 ASSESSMENT — PAIN DESCRIPTION - DESCRIPTORS
DESCRIPTORS: BURNING;SORE;DISCOMFORT
DESCRIPTORS: ACHING
DESCRIPTORS: SORE
DESCRIPTORS: ACHING;SORE
DESCRIPTORS: ACHING;DISCOMFORT

## 2024-02-23 ASSESSMENT — PAIN SCALES - GENERAL
PAINLEVEL_OUTOF10: 7
PAINLEVEL_OUTOF10: 7
PAINLEVEL_OUTOF10: 8
PAINLEVEL_OUTOF10: 6
PAINLEVEL_OUTOF10: 7

## 2024-02-23 ASSESSMENT — PAIN DESCRIPTION - LOCATION
LOCATION: ABDOMEN;HEAD
LOCATION: ABDOMEN
LOCATION: ABDOMEN
LOCATION: GENERALIZED

## 2024-02-23 NOTE — CARE COORDINATION
Social Work    Micha see's Micha Ingram was contacted by Dr. Swan, pt now staying in hospital until Monday.    Micha spoke with Micha Ingram and Dr. Hathaway, per Dr. Swan pt will be picked up for [facility] at 11am Monday.       Micha did explain [facility] is linked with [facility] for mental health support and all needs, so all of mom's need can be med via [facility] at time of dc.      Pt aware that [facility] will be in hospital until Monday and [facility]

## 2024-02-23 NOTE — CARE COORDINATION
Social Work    Micha spoke with mom who is hesitant to go to [facility] today, states she wants to wait until Monday, see her kids, and visit with baby.    Sw discussed that [facility] will work with her on all these issues/needs, as discussed in depth yesterday.  Mom also states she wants to have her bags packed.  Micha discussed with mom yesterday that mom could have her belongings brought to her last night, or [facility] will also help with this upon arrival.      Sw validated mom's anxiety as normal and encouraged mom to stick to plan, and that [facility] is enroute and is is too late to cancel (due her in 8 minutes).      CM working on getting meds approved as mom has no active insurance.

## 2024-02-23 NOTE — PLAN OF CARE
Problem: Pain  Goal: Verbalizes/displays adequate comfort level or baseline comfort level  Outcome: Progressing     Problem: Postpartum  Goal: Experiences normal postpartum course  Description:  Postpartum OB-Pregnancy care plan goal which identifies if the mother is experiencing a normal postpartum course  Outcome: Progressing

## 2024-02-23 NOTE — CARE COORDINATION
Mother and Child Dependency Program    Micha received call from Dr. Swan about Pt who is set to discharge to [facility] today. Pt is currently tapering down for benzodiazepine withdrawal. Dr. Swan wanted to make sure [facility] medical staff were comfortable continuing the taper outpatient.     Micha reached out to the NP Paris with [facility]. Paris confirmed that they would be agreeable to continuing the taper outpatient.     Micha informed Dr. Swan and the discharge was going to continue as planned.     Micha received a call a little later from Dr. Swan informing that the Pt now had an elevated PPD score. Due to this and the tapering, Pt is going to stay inpatient over the weekend. Micha talked to [facility] staff [facility] and informed him of this.     Pt will be staying at the hospital until Monday when she will be discharged to [facility].     [facility] will be here to pickup Pt on Monday at 11am on 2/26 .

## 2024-02-24 PROBLEM — Z98.890 POST-OPERATIVE STATE: Status: ACTIVE | Noted: 2024-02-24

## 2024-02-24 PROCEDURE — 6370000000 HC RX 637 (ALT 250 FOR IP)

## 2024-02-24 PROCEDURE — 1220000000 HC SEMI PRIVATE OB R&B

## 2024-02-24 PROCEDURE — 99231 SBSQ HOSP IP/OBS SF/LOW 25: CPT | Performed by: STUDENT IN AN ORGANIZED HEALTH CARE EDUCATION/TRAINING PROGRAM

## 2024-02-24 PROCEDURE — 6370000000 HC RX 637 (ALT 250 FOR IP): Performed by: STUDENT IN AN ORGANIZED HEALTH CARE EDUCATION/TRAINING PROGRAM

## 2024-02-24 RX ORDER — GABAPENTIN 100 MG/1
500 CAPSULE ORAL EVERY 6 HOURS
Status: COMPLETED | OUTPATIENT
Start: 2024-02-25 | End: 2024-02-26

## 2024-02-24 RX ORDER — LORAZEPAM 1 MG/1
1 TABLET ORAL ONCE
Status: COMPLETED | OUTPATIENT
Start: 2024-02-24 | End: 2024-02-24

## 2024-02-24 RX ADMIN — IBUPROFEN 600 MG: 600 TABLET, FILM COATED ORAL at 23:31

## 2024-02-24 RX ADMIN — OXYCODONE 10 MG: 5 TABLET ORAL at 01:56

## 2024-02-24 RX ADMIN — GABAPENTIN 600 MG: 300 CAPSULE ORAL at 14:22

## 2024-02-24 RX ADMIN — HYDROXYZINE HYDROCHLORIDE 25 MG: 25 TABLET, FILM COATED ORAL at 01:56

## 2024-02-24 RX ADMIN — GABAPENTIN 600 MG: 300 CAPSULE ORAL at 02:36

## 2024-02-24 RX ADMIN — OXYCODONE 5 MG: 5 TABLET ORAL at 08:54

## 2024-02-24 RX ADMIN — OXYCODONE 10 MG: 5 TABLET ORAL at 14:21

## 2024-02-24 RX ADMIN — HYDROXYZINE HYDROCHLORIDE 25 MG: 25 TABLET, FILM COATED ORAL at 14:22

## 2024-02-24 RX ADMIN — CYCLOBENZAPRINE 10 MG: 10 TABLET, FILM COATED ORAL at 01:56

## 2024-02-24 RX ADMIN — GABAPENTIN 500 MG: 100 CAPSULE ORAL at 08:53

## 2024-02-24 RX ADMIN — CYCLOBENZAPRINE 10 MG: 10 TABLET, FILM COATED ORAL at 17:00

## 2024-02-24 RX ADMIN — CYCLOBENZAPRINE 10 MG: 10 TABLET, FILM COATED ORAL at 08:54

## 2024-02-24 RX ADMIN — IBUPROFEN 600 MG: 600 TABLET, FILM COATED ORAL at 17:00

## 2024-02-24 RX ADMIN — ACETAMINOPHEN 1000 MG: 500 TABLET ORAL at 08:52

## 2024-02-24 RX ADMIN — OXYCODONE 10 MG: 5 TABLET ORAL at 20:52

## 2024-02-24 RX ADMIN — GABAPENTIN 500 MG: 100 CAPSULE ORAL at 20:52

## 2024-02-24 RX ADMIN — LORAZEPAM 1 MG: 1 TABLET ORAL at 02:36

## 2024-02-24 RX ADMIN — HYDROXYZINE HYDROCHLORIDE 25 MG: 25 TABLET, FILM COATED ORAL at 23:33

## 2024-02-24 RX ADMIN — ACETAMINOPHEN 1000 MG: 500 TABLET ORAL at 17:00

## 2024-02-24 RX ADMIN — IBUPROFEN 600 MG: 600 TABLET, FILM COATED ORAL at 08:54

## 2024-02-24 RX ADMIN — HYDROXYZINE HYDROCHLORIDE 25 MG: 25 TABLET, FILM COATED ORAL at 08:54

## 2024-02-24 RX ADMIN — IBUPROFEN 600 MG: 600 TABLET, FILM COATED ORAL at 01:56

## 2024-02-24 RX ADMIN — Medication 1 TABLET: at 08:53

## 2024-02-24 RX ADMIN — ACETAMINOPHEN 1000 MG: 500 TABLET ORAL at 01:55

## 2024-02-24 RX ADMIN — ACETAMINOPHEN 1000 MG: 500 TABLET ORAL at 23:33

## 2024-02-24 RX ADMIN — SIMETHICONE 80 MG: 80 TABLET, CHEWABLE ORAL at 08:52

## 2024-02-24 ASSESSMENT — PAIN DESCRIPTION - LOCATION
LOCATION: ABDOMEN;BACK;HEAD;LEG
LOCATION: ABDOMEN;OTHER (COMMENT)
LOCATION: ABDOMEN;INCISION;LEG
LOCATION: ABDOMEN;OTHER (COMMENT)
LOCATION: ABDOMEN;LEG;BACK

## 2024-02-24 ASSESSMENT — PAIN SCALES - GENERAL
PAINLEVEL_OUTOF10: 7
PAINLEVEL_OUTOF10: 8
PAINLEVEL_OUTOF10: 7
PAINLEVEL_OUTOF10: 8
PAINLEVEL_OUTOF10: 7

## 2024-02-24 ASSESSMENT — PAIN DESCRIPTION - DESCRIPTORS
DESCRIPTORS: ACHING
DESCRIPTORS: ACHING;SORE
DESCRIPTORS: BURNING;ACHING
DESCRIPTORS: ACHING;DISCOMFORT;TENDER;SORE
DESCRIPTORS: ACHING;DISCOMFORT;SORE

## 2024-02-24 ASSESSMENT — PAIN - FUNCTIONAL ASSESSMENT
PAIN_FUNCTIONAL_ASSESSMENT: ACTIVITIES ARE NOT PREVENTED

## 2024-02-24 ASSESSMENT — PAIN DESCRIPTION - ORIENTATION
ORIENTATION: LOWER;MID

## 2024-02-24 NOTE — FLOWSHEET NOTE
Patient educated on medical recommendation of remaining on unit. Patient educated on risks of leaving unit which could include, but are not limited to, injury to self or unborn child (if applicable). Patient verbalizes understanding and signs the patient release portion of the  “Patients Remaining on Their Unit Form”. Patient down to NICU pushing wheelchair.

## 2024-02-25 PROCEDURE — 6370000000 HC RX 637 (ALT 250 FOR IP)

## 2024-02-25 PROCEDURE — 6370000000 HC RX 637 (ALT 250 FOR IP): Performed by: STUDENT IN AN ORGANIZED HEALTH CARE EDUCATION/TRAINING PROGRAM

## 2024-02-25 PROCEDURE — 1220000000 HC SEMI PRIVATE OB R&B

## 2024-02-25 RX ORDER — LORAZEPAM 1 MG/1
1 TABLET ORAL PRN
Status: DISCONTINUED | OUTPATIENT
Start: 2024-02-25 | End: 2024-02-26 | Stop reason: HOSPADM

## 2024-02-25 RX ORDER — DIPHENHYDRAMINE HCL 25 MG
25 TABLET ORAL EVERY 6 HOURS PRN
Status: DISCONTINUED | OUTPATIENT
Start: 2024-02-25 | End: 2024-02-26 | Stop reason: HOSPADM

## 2024-02-25 RX ORDER — LANOLIN ALCOHOL/MO/W.PET/CERES
3 CREAM (GRAM) TOPICAL NIGHTLY PRN
Status: DISCONTINUED | OUTPATIENT
Start: 2024-02-25 | End: 2024-02-26 | Stop reason: HOSPADM

## 2024-02-25 RX ORDER — LORAZEPAM 1 MG/1
1 TABLET ORAL ONCE
Status: DISCONTINUED | OUTPATIENT
Start: 2024-02-25 | End: 2024-02-26

## 2024-02-25 RX ADMIN — GABAPENTIN 500 MG: 100 CAPSULE ORAL at 09:06

## 2024-02-25 RX ADMIN — GABAPENTIN 500 MG: 100 CAPSULE ORAL at 16:08

## 2024-02-25 RX ADMIN — DIPHENHYDRAMINE HYDROCHLORIDE 25 MG: 25 TABLET ORAL at 23:25

## 2024-02-25 RX ADMIN — CYCLOBENZAPRINE 10 MG: 10 TABLET, FILM COATED ORAL at 12:26

## 2024-02-25 RX ADMIN — OXYCODONE 10 MG: 5 TABLET ORAL at 23:24

## 2024-02-25 RX ADMIN — CYCLOBENZAPRINE 10 MG: 10 TABLET, FILM COATED ORAL at 23:25

## 2024-02-25 RX ADMIN — IBUPROFEN 600 MG: 600 TABLET, FILM COATED ORAL at 19:19

## 2024-02-25 RX ADMIN — GABAPENTIN 500 MG: 100 CAPSULE ORAL at 23:25

## 2024-02-25 RX ADMIN — OXYCODONE 10 MG: 5 TABLET ORAL at 12:27

## 2024-02-25 RX ADMIN — Medication 3 MG: at 04:18

## 2024-02-25 RX ADMIN — OXYCODONE 10 MG: 5 TABLET ORAL at 17:12

## 2024-02-25 RX ADMIN — DIPHENHYDRAMINE HYDROCHLORIDE 25 MG: 25 TABLET ORAL at 04:18

## 2024-02-25 RX ADMIN — CYCLOBENZAPRINE 10 MG: 10 TABLET, FILM COATED ORAL at 02:24

## 2024-02-25 RX ADMIN — Medication 3 MG: at 23:25

## 2024-02-25 RX ADMIN — IBUPROFEN 600 MG: 600 TABLET, FILM COATED ORAL at 05:47

## 2024-02-25 RX ADMIN — IBUPROFEN 600 MG: 600 TABLET, FILM COATED ORAL at 12:26

## 2024-02-25 RX ADMIN — HYDROXYZINE HYDROCHLORIDE 25 MG: 25 TABLET, FILM COATED ORAL at 16:08

## 2024-02-25 RX ADMIN — HYDROXYZINE HYDROCHLORIDE 25 MG: 25 TABLET, FILM COATED ORAL at 07:27

## 2024-02-25 RX ADMIN — OXYCODONE 10 MG: 5 TABLET ORAL at 02:24

## 2024-02-25 RX ADMIN — ACETAMINOPHEN 1000 MG: 500 TABLET ORAL at 19:18

## 2024-02-25 RX ADMIN — LORAZEPAM 1 MG: 1 TABLET ORAL at 17:12

## 2024-02-25 RX ADMIN — OXYCODONE 10 MG: 5 TABLET ORAL at 07:27

## 2024-02-25 RX ADMIN — ACETAMINOPHEN 1000 MG: 500 TABLET ORAL at 12:26

## 2024-02-25 RX ADMIN — ACETAMINOPHEN 1000 MG: 500 TABLET ORAL at 05:48

## 2024-02-25 ASSESSMENT — PAIN DESCRIPTION - ORIENTATION
ORIENTATION: MID;LOWER
ORIENTATION: LOWER;MID

## 2024-02-25 ASSESSMENT — PAIN DESCRIPTION - DESCRIPTORS
DESCRIPTORS: ACHING
DESCRIPTORS: BURNING;ACHING;SORE
DESCRIPTORS: CRAMPING;DISCOMFORT;SORE
DESCRIPTORS: ACHING;DISCOMFORT;SORE
DESCRIPTORS: CRAMPING;DISCOMFORT;ACHING
DESCRIPTORS: ACHING;DISCOMFORT;SORE

## 2024-02-25 ASSESSMENT — PAIN SCALES - GENERAL
PAINLEVEL_OUTOF10: 8
PAINLEVEL_OUTOF10: 7
PAINLEVEL_OUTOF10: 9
PAINLEVEL_OUTOF10: 7
PAINLEVEL_OUTOF10: 6
PAINLEVEL_OUTOF10: 7
PAINLEVEL_OUTOF10: 6
PAINLEVEL_OUTOF10: 8
PAINLEVEL_OUTOF10: 5
PAINLEVEL_OUTOF10: 6

## 2024-02-25 ASSESSMENT — PAIN - FUNCTIONAL ASSESSMENT
PAIN_FUNCTIONAL_ASSESSMENT: ACTIVITIES ARE NOT PREVENTED

## 2024-02-25 ASSESSMENT — PAIN DESCRIPTION - LOCATION
LOCATION: ABDOMEN
LOCATION: ABDOMEN;LEG
LOCATION: ABDOMEN;OTHER (COMMENT)
LOCATION: ABDOMEN
LOCATION: ABDOMEN
LOCATION: ABDOMEN;LEG

## 2024-02-26 ENCOUNTER — TELEPHONE (OUTPATIENT)
Dept: OBGYN | Age: 29
End: 2024-02-26

## 2024-02-26 VITALS
OXYGEN SATURATION: 98 % | HEART RATE: 77 BPM | SYSTOLIC BLOOD PRESSURE: 140 MMHG | DIASTOLIC BLOOD PRESSURE: 79 MMHG | RESPIRATION RATE: 16 BRPM | TEMPERATURE: 98.4 F

## 2024-02-26 PROCEDURE — 6370000000 HC RX 637 (ALT 250 FOR IP)

## 2024-02-26 PROCEDURE — 6370000000 HC RX 637 (ALT 250 FOR IP): Performed by: STUDENT IN AN ORGANIZED HEALTH CARE EDUCATION/TRAINING PROGRAM

## 2024-02-26 RX ORDER — GABAPENTIN 100 MG/1
450 CAPSULE ORAL EVERY 12 HOURS SCHEDULED
Status: DISCONTINUED | OUTPATIENT
Start: 2024-02-26 | End: 2024-02-26

## 2024-02-26 RX ORDER — GABAPENTIN 100 MG/1
450 CAPSULE ORAL EVERY 12 HOURS SCHEDULED
Status: DISCONTINUED | OUTPATIENT
Start: 2024-02-26 | End: 2024-02-26 | Stop reason: HOSPADM

## 2024-02-26 RX ORDER — GABAPENTIN 300 MG/1
300 CAPSULE ORAL EVERY 12 HOURS SCHEDULED
Status: DISCONTINUED | OUTPATIENT
Start: 2024-02-26 | End: 2024-02-26 | Stop reason: HOSPADM

## 2024-02-26 RX ORDER — ESCITALOPRAM OXALATE 10 MG/1
10 TABLET ORAL DAILY
Qty: 30 TABLET | Refills: 2 | Status: SHIPPED | OUTPATIENT
Start: 2024-02-26 | End: 2024-05-26

## 2024-02-26 RX ORDER — GABAPENTIN 100 MG/1
CAPSULE ORAL
Qty: 72 CAPSULE | Refills: 0 | Status: SHIPPED | OUTPATIENT
Start: 2024-02-26 | End: 2024-03-15

## 2024-02-26 RX ORDER — ESCITALOPRAM OXALATE 10 MG/1
10 TABLET ORAL DAILY
Qty: 30 TABLET | Refills: 2 | Status: SHIPPED | OUTPATIENT
Start: 2024-02-26 | End: 2024-02-26

## 2024-02-26 RX ADMIN — HYDROXYZINE HYDROCHLORIDE 25 MG: 25 TABLET, FILM COATED ORAL at 08:40

## 2024-02-26 RX ADMIN — ACETAMINOPHEN 1000 MG: 500 TABLET ORAL at 03:13

## 2024-02-26 RX ADMIN — IBUPROFEN 600 MG: 600 TABLET, FILM COATED ORAL at 08:40

## 2024-02-26 RX ADMIN — ACETAMINOPHEN 1000 MG: 500 TABLET ORAL at 08:40

## 2024-02-26 RX ADMIN — OXYCODONE 10 MG: 5 TABLET ORAL at 07:10

## 2024-02-26 RX ADMIN — LORAZEPAM 1 MG: 1 TABLET ORAL at 03:13

## 2024-02-26 RX ADMIN — IBUPROFEN 600 MG: 600 TABLET, FILM COATED ORAL at 03:13

## 2024-02-26 RX ADMIN — HYDROXYZINE HYDROCHLORIDE 25 MG: 25 TABLET, FILM COATED ORAL at 00:21

## 2024-02-26 RX ADMIN — OXYCODONE 10 MG: 5 TABLET ORAL at 11:17

## 2024-02-26 RX ADMIN — DOCUSATE SODIUM 100 MG: 100 CAPSULE, LIQUID FILLED ORAL at 08:40

## 2024-02-26 RX ADMIN — CYCLOBENZAPRINE 10 MG: 10 TABLET, FILM COATED ORAL at 08:40

## 2024-02-26 RX ADMIN — GABAPENTIN 500 MG: 100 CAPSULE ORAL at 07:10

## 2024-02-26 ASSESSMENT — PAIN SCALES - GENERAL
PAINLEVEL_OUTOF10: 7

## 2024-02-26 ASSESSMENT — PAIN DESCRIPTION - ORIENTATION
ORIENTATION: LOWER;MID
ORIENTATION: LOWER

## 2024-02-26 ASSESSMENT — PAIN - FUNCTIONAL ASSESSMENT
PAIN_FUNCTIONAL_ASSESSMENT: ACTIVITIES ARE NOT PREVENTED
PAIN_FUNCTIONAL_ASSESSMENT: ACTIVITIES ARE NOT PREVENTED

## 2024-02-26 ASSESSMENT — PAIN DESCRIPTION - DESCRIPTORS
DESCRIPTORS: BURNING;ACHING;DISCOMFORT;SORE
DESCRIPTORS: DISCOMFORT;SORE;CRAMPING

## 2024-02-26 ASSESSMENT — PAIN DESCRIPTION - LOCATION
LOCATION: ABDOMEN
LOCATION: ABDOMEN

## 2024-02-26 NOTE — FLOWSHEET NOTE
Writer spoke with Dr. Escobar that medications pt needs for discharge still haven't been delivered. And pt declined that they were ever delivered.

## 2024-02-26 NOTE — TELEPHONE ENCOUNTER
----- Message from Radha Damon DO sent at 2/25/2024  9:36 AM EST -----  Hi, Dr. Linares saw this patient in the hospital on post partum and she would like to be [facility]. Please re-schedule her post partum appointment on Thursday 2/29 with Dr. Linares. Thanks!

## 2024-02-26 NOTE — FLOWSHEET NOTE
Pt declined current available medications at this time as she is going to down to NICU to visit her baby. She stated that she would take them all when she got back.

## 2024-02-26 NOTE — TELEPHONE ENCOUNTER
Patients appointment for 2/26 was cancelled, patient rescheduled for 2/29 @ 10:30AM.    Voicemail left for patient to call the office for appointment information.

## 2024-02-26 NOTE — CARE COORDINATION
Case Management    CM contacted Marline Babin via phone [phone] and [facility] to request approval for medications ordered for patient's withdrawal symptoms.    They have ordered the following medications:     Gabapentin (NEURONTIN) 100 MG capsule: Take 3 capsules by mouth every 6 hours for 1 day, THEN 3 capsules every 8 (eight) hours for 1 day, THEN 3 capsules every 12 hours for 1 day, THEN 3 capsules daily for 15 days. Intended supply: 90 days., Disp-72 capsule, R-0   LORazepam (ATIVAN) 0.5 MG tabletTake 1 tablet by mouth 3 times daily for 1 day, THEN 1 tablet 2 times daily for 1 day, THEN 1 tablet daily for 1 day. Max Daily Amount: 1.5 mg., Disp-6 tablet, R-0   escitalopram (LEXAPRO) 10 MG tablet : Take 1 tablet by mouth daily, Disp-30 tablet, R-2   hydrOXYzine HCl (ATARAX) 25 MG tablet : Take 1 tablet by mouth every 6 hours as needed for Itching, Disp-16 tablet, R-0     Patient is to be going to [facility] for IP management of w/d at 11am.    LIMA also didn't see any new notes from Hussein in HELP department from last week Friday regarding medicaid application. CM contacted him at [phone] and [facility]    ADDENDUM @ 0900: CM spoke w/ Marline Babin [facility]. She approved to cover  Gabapentin (72 capsules)  Ativan (6 tablets)  Atarax (16 tablets)    Patient has not started taking the Lexapro and its a new prescription-Marline instructed this CM to have a paper prescription written and have Deanna take it with her to [facility] as they are a part of [place] who has their own [facility] and will be able to fill for there there.    CM contacted OB resident room-they are in a C/S. CM contacted her bedside RN Antonieta and informed of above.     Faxed [facility] Med Assist form to [facility] @ [phone] and request they are delivered to RN station by 11am. CM contacted  Pharmacy @ [phone] and spoke w/ April to notify of above.

## 2024-02-26 NOTE — FLOWSHEET NOTE
Pt called out that she woke up and felt a increase in her anxiety. CIWA score 9 and COWS 7. Dr. Escobar notified. Pt was also visibly nodding white writer was in the door. Pt said that the benadryl made her tired but she keeps waking every 5 minutes. Per Dr. Escobar 1 mg of oral ativan given with motrin and tylenol. Hold Roxicodone at this time.

## 2024-02-26 NOTE — PROGRESS NOTES
was [facility].  went to 7th floor.  No family present.  Patient taken to OR for emergency C- Section.        Chaplains available via [employer] 24/7.  
CLINICAL PHARMACY NOTE: MEDS TO BEDS    Total # of Prescriptions Filled: 5   The following medications were delivered to the patient:  IXY 5MG   GABAPENTIN 100MG  HYDROXYZINE 25MG  LEXAPRO 10MH  ATIVAN 0.5MG     Additional Documentation:     OTHER MEDS NOT COVERED ON VOUCHER   
OB/GYN RESIDENT INTERVAL NOTE    Discussed with patient her hgb level of 6.9 and that we recommend blood transfusion for a level less than 7. She denies any s/s of anemia at this time and is declining blood transfusion. She is amenable to IV IRON, will order venofer       Vitals:    02/19/24 2016 02/20/24 0100 02/20/24 0520 02/20/24 0945   BP: 106/62 (!) 114/57 121/76 117/74   Pulse: 58 62 62 67   Resp: 17 16 18 16   Temp: 98.4 °F (36.9 °C) 98.4 °F (36.9 °C) 98 °F (36.7 °C) 97.5 °F (36.4 °C)   TempSrc:    Oral   SpO2: 98% 96% 97%          Recent Results (from the past 12 hour(s))   CBC    Collection Time: 02/20/24  5:59 AM   Result Value Ref Range    WBC 10.1 3.5 - 11.3 k/uL    RBC 3.18 (L) 3.95 - 5.11 m/uL    Hemoglobin 6.9 (LL) 11.9 - 15.1 g/dL    Hematocrit 23.4 (L) 36.3 - 47.1 %    MCV 73.6 (L) 82.6 - 102.9 fL    MCH 21.7 (L) 25.2 - 33.5 pg    MCHC 29.5 28.4 - 34.8 g/dL    RDW 15.2 (H) 11.8 - 14.4 %    Platelets 283 138 - 453 k/uL    MPV 10.9 8.1 - 13.5 fL    NRBC Automated 0.0 0.0 per 100 WBC         Paris Park DO  OB/GYN Resident  [facility]  2/20/2024 2:08 PM       
Ob/Gyn Resident Interval Note       Spoke with Nataly Ann, Mother and Child Dependency due to patient needing Gabapentin and Ativan taper continued outpatient. Per Nataly, [facility] does have ability to do taper. Patient then reported EPDS of 21 just prior to arrival of [facility].     Patient spoke with [facility] RN and coordinator. They agreed that patient can still be discharged to [facility]. Time planned for Monday, 2/26, at 1100.    Spoke with [facility] who has been handling patient's case due to miscommunication in plan and patient's care.  is aware of patient's insurance/Rx payment barriers and has worked a solution for this issue. Will obtain Rx for patient today in preparation for discharge Monday.      Plan discussed with attending.       Denia Vincent,   Ob/Gyn Resident  [facility]   2/23/2024 10:25 AM    
POST OPERATIVE DAY # 1    Deanna Cordova is a 28 y.o. female   This patient was seen and examined today. PLTCS w/ right ext, cystoscopy on 2/19/24    Her pregnancy was complicated by:   Patient Active Problem List   Diagnosis    Renal cyst, right    RLTCS 2/19/24 F Apg 8/9 Wt 8#0    Hx CS x3     Today she is doing well without any chief complaint. Her lochia is light. She denies chest pain, shortness of breath, headache, lightheadedness, blurred vision and peripheral edema. She is bottle feeding and she denies any signs or symptoms of mastitis.  She is ambulating well. She is voiding without difficulty. She currently denies S/S of postpartum depression. Flatus present.  Bowel movement absent. She is tolerating solids.    Vital Signs:  Vitals:    02/19/24 0925 02/19/24 1330 02/19/24 1830 02/19/24 2016   BP: (Abnormal) 138/92 132/83 128/87 106/62   Pulse: 57 64 54 58   Resp: 16 16 14 17   Temp: 98 °F (36.7 °C) 98.6 °F (37 °C) 98.6 °F (37 °C) 98.4 °F (36.9 °C)   TempSrc: Oral Oral Oral    SpO2:  97%  98%     Urine Input & Output last 24hrs:     Intake/Output Summary (Last 24 hours) at 2/20/2024 0201  Last data filed at 2/19/2024 2016  Gross per 24 hour   Intake 1210 ml   Output 1480 ml   Net -270 ml     Physical Exam:  General:  no apparent distress, alert and cooperative  Neurologic:  alert, oriented, normal speech, no focal findings or movement disorder noted  Lungs:  No increased work of breathing,no conversational dyspnea  Heart:  regular rate  Abdomen: abdomen soft, non-distended, appropriately tender  Fundus: appropriately tender, firm, below umbilicus  Extremities:  no calf tenderness, non edematous  Incision: Pressure dressing in place, not saturated - removed, incision appears clean, dry and intact, replaced with silver    Labs:  Lab Results   Component Value Date    WBC 12.5 (H) 02/19/2024    HGB 10.0 (L) 02/19/2024    HCT 32.9 (L) 02/19/2024    MCV 70.9 (L) 02/19/2024     02/19/2024 
POST OPERATIVE DAY # 2    Deanna Cordova is a 28 y.o. female   This patient was seen and examined today.     Her pregnancy was complicated by:   Patient Active Problem List   Diagnosis    Renal cyst, right    RLTCS 2/19/24 F Apg 8/9 Wt 8#0    Hx CS x3     Today she is doing well without any chief complaint. Her lochia is light. She denies chest pain, shortness of breath, headache, lightheadedness, blurred vision, and peripheral edema. She is bottlefeeding and she denies any signs or symptoms of mastitis.  She is ambulating well. She is voiding without difficulty. She currently denies S/S of postpartum depression. Flatus present.  Bowel movement absent. She is tolerating solids.    Discussed positive benzo UDS with patient. Patient denies use of benzos, reports only Percocet use. Counseled patient that withdrawal from Benzos can be life threatening, reviewed s/sx of withdrawal with patient and she denies s/sx at this time. She reports the SW educated her she should notify RN if she experiences any signs, stressed the importance of patient notifying her RN if symptomatic and provided support that we can help her through withdrawal. Patient verbalized understanding.     Vital Signs:  Vitals:    02/20/24 0520 02/20/24 0945 02/20/24 1400 02/21/24 0103   BP: 121/76 117/74 137/77 123/73   Pulse: 62 67 64 54   Resp: 18 16 14 17   Temp: 98 °F (36.7 °C) 97.5 °F (36.4 °C) 98 °F (36.7 °C) 98 °F (36.7 °C)   TempSrc:  Oral Oral    SpO2: 97%   95%     Urine Input & Output last 24hrs:   No intake or output data in the 24 hours ending 02/21/24 0538    Physical Exam:  General:  no apparent distress, alert, and cooperative  Neurologic:  alert, oriented, normal speech, no focal findings or movement disorder noted  Lungs:  No increased work of breathing, no conversational dyspnea  Heart:  regular rate and rhythm    Abdomen: abdomen soft, non-distended, non-tender  Fundus: non-tender, normal size, firm, below umbilicus  Incision: silver 
POST OPERATIVE DAY # 3    Deanna Cordova is a 28 y.o. female   This patient was seen and examined today.     Her pregnancy was complicated by:   Patient Active Problem List   Diagnosis    Renal cyst, right    RLTCS 24 F Apg 8/9 Wt 8#0    Hx CS x3    Preeclampsia w/o SF (see overview below)     Today she is doing well without any chief complaint. Her lochia is light. She denies chest pain, shortness of breath, headache, lightheadedness, blurred vision, and peripheral edema. She is bottle feeding and she denies any signs or symptoms of mastitis.  She is ambulating well. She is voiding without difficulty. She currently denies S/S of postpartum depression. Flatus present.  Bowel movement absent. She is tolerating solids.    Vital Signs:  Vitals:    24 1612 24 1833 24 0022   BP: 126/79  (!) 150/82 107/76   Pulse: 55  (!) 48 63   Resp: 20 16 17 17   Temp: 98.2 °F (36.8 °C)  97.7 °F (36.5 °C) 97.9 °F (36.6 °C)   TempSrc:   Oral Oral   SpO2: 99%  96%      Urine Input & Output last 24hrs:   No intake or output data in the 24 hours ending 24 0237    Physical Exam:  General:  no apparent distress, alert, and cooperative  Neurologic:  alert, oriented, normal speech, no focal findings or movement disorder noted  Lungs:  No increased work of breathing noted  Heart:  regular rate and rhythm    Abdomen: abdomen soft, non-distended, non-tender  Fundus: non-tender, normal size, firm, below umbilicus  Incision: silver dressing in place, not saturated  Extremities:  no calf tenderness, non edematous    Labs:  Lab Results   Component Value Date    WBC 10.1 2024    HGB 6.8 (LL) 2024    HCT 22.2 (L) 2024    MCV 73.6 (L) 2024     2024     Assessment/Plan:  Deanna Cordova is a  POD # 3 s/p RLTCS   - Doing well, afebrile   - female infant in General Care Nursery   - Encourage ambulation and use of incentive spirometer   - D/C don catheter and saline lock 
POST OPERATIVE DAY # 4    Deanna Cordova is a 28 y.o. female   This patient was seen and examined today.     Her pregnancy was complicated by:   Patient Active Problem List   Diagnosis    Renal cyst, right    RLTCS 2/19/24 F Apg 8/9 Wt 8#0    Hx CS x3    Preeclampsia w/o SF (see overview below)    Benzodiazepine withdrawal (HCC)     Today she is doing well from postpartum standpoint but reports increased anxiety. Her lochia is light. She denies chest pain, shortness of breath, headache, lightheadedness, blurred vision, and peripheral edema. She is bottle feeding and she denies any signs or symptoms of mastitis.  She is ambulating well. She is voiding without difficulty. She currently denies S/S of postpartum depression. Flatus present.  Bowel movement absent. She is tolerating solids.    CIWA Assessment  BP: 130/79  Pulse: (!) 45  Nausea and Vomiting: no nausea and no vomiting  Tactile Disturbances: none  Tremor: no tremor  Auditory Disturbances: not present  Paroxysmal Sweats: 2  Visual Disturbances: very mild sensitivity  Anxiety: 3  Headache, Fullness in Head: very mild  Agitation: normal activity  Orientation and Clouding of Sensorium: oriented and can do serial additions  CIWA-Ar Total: 7    Vital Signs:  Vitals:    02/22/24 1700 02/22/24 2101 02/22/24 2348 02/23/24 0420   BP: 112/73 (!) 145/89 (!) 150/88 130/79   Pulse: 51 52 (!) 46 (!) 45   Resp: 18 18 18 18   Temp: 98.1 °F (36.7 °C) 98.6 °F (37 °C) 98.7 °F (37.1 °C) 98.9 °F (37.2 °C)   TempSrc: Oral Oral Oral Oral   SpO2:  98%       Urine Input & Output last 24hrs:   No intake or output data in the 24 hours ending 02/23/24 0611    Physical Exam:   General:  no apparent distress, alert, and cooperative  Neurologic:  alert, oriented, normal speech, no focal findings or movement disorder noted  Lungs:  No increased work of breathing noted  Heart:  regular rate and rhythm    Abdomen: abdomen soft, non-distended, non-tender  Fundus: non-tender, normal size, firm, 
POST OPERATIVE DAY # 6    Deanna Cordova is a 28 y.o. female   This patient was seen and examined today. RLTCS on 24    Her pregnancy was complicated by:   Patient Active Problem List   Diagnosis    Renal cyst, right    RLTCS 24 F Apg 8/9 Wt 8#0    Hx CS x3    Preeclampsia w/o SF (see overview below)    Benzodiazepine withdrawal (HCC)    Post-operative state       Today she is doing well without any chief complaint. Her lochia is light. She denies chest pain, shortness of breath, headache, lightheadedness, blurred vision and peripheral edema. She is bottle and breast feeding and she denies any signs or symptoms of mastitis.  She is ambulating well. She is voiding without difficulty. She currently denies S/S of postpartum depression. Flatus present.  Bowel movement present. She is tolerating solids.    Vital Signs:  Vitals:    244 24 022   BP:  131/83  (!) 136/96   Pulse:  (!) 101  68   Resp:    Temp:  98.1 °F (36.7 °C)  98.2 °F (36.8 °C)   TempSrc:  Oral  Oral   SpO2:  96%  96%         Urine Input & Output last 24hrs:   No intake or output data in the 24 hours ending 24 0402    Physical Exam:  General:  no apparent distress, alert and cooperative  Neurologic:  alert, oriented, normal speech, no focal findings or movement disorder noted  Lungs:  No increased work of breathing or conversational dyspnea   Heart:  Regular rate  Abdomen: abdomen soft, non-distended, non-tender, bowel sounds present   Fundus: non-tender, firm, below umbilicus  Incision: silver dressing in place with minimal saturation   Extremities:  no calf tenderness, non edematous    Labs:  Lab Results   Component Value Date    WBC 10.1 2024    HGB 8.0 (L) 2024    HCT 27.1 (L) 2024    MCV 73.6 (L) 2024     2024       Assessment/Plan:  Deanna Cordova is a  POD # 6 s/p  RLTCS (general) w/ right extension and cystoscopy 24   - Doing 
POST OPERATIVE DAY # 7    Deanna Cordova is a 28 y.o. female   This patient was seen and examined today.     Her pregnancy was complicated by:   Patient Active Problem List   Diagnosis    Renal cyst, right    RLTCS 24 F Apg 8/9 Wt 8#0    Hx CS x3    Preeclampsia w/o SF (see overview below)    Benzodiazepine withdrawal (HCC)    Post-operative state     Today she is doing well without any chief complaint. Her lochia is light. She denies chest pain, shortness of breath, headache, lightheadedness, blurred vision, and peripheral edema. She is  bottle feeding and she denies any signs or symptoms of mastitis.  She is ambulating well. She is voiding without difficulty. She currently denies S/S of postpartum depression. Flatus present.  Bowel movement present. She is tolerating solids.    Vital Signs:  Vitals:    24 1225 24 1600 24 2103 24 2324   BP: 103/72 107/76 120/84    Pulse: 79 84 100 (!) 107   Resp: 18 18 17 18   Temp: 97.7 °F (36.5 °C) 97.9 °F (36.6 °C) 97.7 °F (36.5 °C)    TempSrc: Oral Oral Oral    SpO2: 95% 96% 98%      Urine Input & Output last 24hrs:   No intake or output data in the 24 hours ending 24 0134    Physical Exam:  General:  no apparent distress, alert, and cooperative  Neurologic:  alert, oriented, normal speech, no focal findings or movement disorder noted  Lungs:  No increased work of breathing, good air exchange, clear to auscultation bilaterally, no crackles or wheezing  Heart:  regular rate and rhythm    Abdomen: abdomen soft, non-distended, non-tender  Fundus: non-tender, normal size, firm, below umbilicus  Incision: clean, dry, and intact after silver dressing removal  Extremities:  no calf tenderness, non edematous    Labs:  Lab Results   Component Value Date    WBC 10.1 2024    HGB 8.0 (L) 2024    HCT 27.1 (L) 2024    MCV 73.6 (L) 2024     2024     Assessment/Plan:  Deanna Cordova is a  POD # 7 s/p RLTCS with 
POST OPERATIVE DAY #5     Deanna Cordova is a 28 y.o. female   This patient was seen and examined today.     Her pregnancy was complicated by:   Patient Active Problem List   Diagnosis    Renal cyst, right    RLTCS 2/19/24 F Apg 8/9 Wt 8#0    Hx CS x3    Preeclampsia w/o SF (see overview below)    Benzodiazepine withdrawal (HCC)       Today she is doing well without any chief complaint. Her lochia is light. She denies chest pain, shortness of breath, headache, and blurred vision. She is bottle breast feeding and she denies any signs or symptoms of mastitis.  She is ambulating well. She is voiding without difficulty. She currently denies S/S of postpartum depression. Flatus present.  Bowel movement present. She is tolerating solids.    CIWA Assessment  BP:  (pt sleeping)  Pulse: (!) 115  Nausea and Vomiting: mild nausea with no vomiting  Tactile Disturbances: none  Tremor: moderate, with patient's arms extended  Auditory Disturbances: not present  Paroxysmal Sweats: barely perceptible sweating, palms moist  Visual Disturbances: not present  Anxiety: mildly anxious  Headache, Fullness in Head: mild  Agitation: normal activity  Orientation and Clouding of Sensorium: oriented and can do serial additions  CIWA-Ar Total: (!) 9      Vital Signs:  Vitals:    02/23/24 1958 02/23/24 2003 02/24/24 0155 02/24/24 0158   BP: 92/78   115/67   Pulse: 84   (!) 115   Resp: 16 16 20 20   Temp: 97.9 °F (36.6 °C)   98.1 °F (36.7 °C)   TempSrc: Oral   Oral   SpO2: 95%   97%       Urine Input & Output last 24hrs:   No intake or output data in the 24 hours ending 02/24/24 0826    Physical Exam:  General:  no apparent distress and cooperative  Neurologic:  oriented, normal speech, no focal findings or movement disorder noted  Lungs:  No increased work of breathing, good air exchange  Heart:  regular rate and rhythm    Abdomen: abdomen soft, non-distended, non-tender  Fundus: non-tender, normal size, firm, below umbilicus  Incision: silver 
Resident Interval Note    COWS score preformed, 4 at 1750 on 2/21/24    CIWA score preformed, 3 at 1750 on 2/21/24    Will continue to monitor scores q4h unless patient sleeping. Will not need to proceed with management of symptoms at this time.     Alessandro Mishra MD  Obstetrics & Gynecology Resident, PGY-2  [place], [place]  2/21/2024 6:05 PM    
Resident Interval Note    Patient notably had elevated CIWA score of 5 as reported by nursing.  COWS score remained at 4 despite patient reporting new symptoms.    At this time treatment protocol was initiated for benzodiazepine withdrawal.  The patient received an 800 mg loading dose of gabapentin, which will be rolled into a taper of 600 mg every 8 hours on 2/22/2024.  As needed Ativan orders have been placed for vital sign parameters including blood pressure greater than 160/100, or pulse greater than 110 beats per minute.    The patient was spoken to regarding current management plans, and is in agreement.  Additionally, treatment plan was discussed in depth with RN who is in agreement with the plan of care.  Patient will continue undergoing CIWA scoring every 4 hours as well as COWS scoring every 4 hours.    Patient also notably has hemoglobin of 6.8 on last labs series, and 1 unit of packed red blood cells was ordered.  The patient is declining at this time, as she would like to elect to go to NICU prior to receiving the transfusion.    If nursing staff, or patient have any concerns regarding this patient's safety or other health concerns, please contact resident on-call.    Alessandro Mishra MD  Obstetrics & Gynecology Resident, PGY-2  [place], [place]  2/21/2024 8:49 PM    
NONREACTIVE    Rubella Antibody, IgG 6.85 IU/mL    T. pallidum, IgG NONREACTIVE NONREACTIVE   Hepatitis C Antibody    Collection Time: 02/19/24  5:13 AM   Result Value Ref Range    Hepatitis C Ab NONREACTIVE NONREACTIVE   DRUG SCREEN MULTI URINE    Collection Time: 02/19/24  7:47 AM   Result Value Ref Range    Amphetamine Screen, Ur NEGATIVE NEGATIVE    Barbiturate Screen, Ur NEGATIVE NEGATIVE    Benzodiazepine Screen, Urine POSITIVE (A) NEGATIVE    Cocaine Metabolite, Urine POSITIVE (A) NEGATIVE    Methadone Screen, Urine NEGATIVE NEGATIVE    Opiates, Urine POSITIVE (A) NEGATIVE    Phencyclidine, Urine NEGATIVE NEGATIVE    Cannabinoid Scrn, Ur NEGATIVE NEGATIVE    Oxycodone Screen, Ur NEGATIVE NEGATIVE    Fentanyl, Ur POSITIVE (A) NEGATIVE    Test Information       Assay provides medical screening only.  The absence of expected drug(s) and/or metabolite(s) may indicate diluted or adulterated urine, limitations of testing or timing of collection.         Plan discussed with attending.        Paris Park DO  OB/GYN Resident  [facility]  2/19/2024 9:38 AM

## 2024-02-26 NOTE — FLOWSHEET NOTE
Patient discharged to \[facility]\"". Instructions given to patient. Patient verbalizes understanding. Supplies given to patient. Medications given to \[facility]\"". Patient to main lobby with \[facility]\"" and all belongings.

## 2024-02-29 ENCOUNTER — POSTPARTUM VISIT (OUTPATIENT)
Dept: OBGYN | Age: 29
End: 2024-02-29

## 2024-02-29 VITALS
DIASTOLIC BLOOD PRESSURE: 83 MMHG | WEIGHT: 115 LBS | HEART RATE: 115 BPM | SYSTOLIC BLOOD PRESSURE: 131 MMHG | BODY MASS INDEX: 21.03 KG/M2

## 2024-02-29 PROCEDURE — 99024 POSTOP FOLLOW-UP VISIT: CPT | Performed by: OBSTETRICS & GYNECOLOGY

## 2024-02-29 NOTE — PROGRESS NOTES
Postpartum Visit    Deanna Cordova is a 28 y.o. female , 1 weeks Post Partum s/p  repeat  section, low transverse incision on 24    The patient was seen. She has no chief complaint today.    She is not breast feeding and denies signs or symptoms of mastitis.  The patient completed the E.P.D.S. Post Partum Depression Evaluation form and EPDS Score of 0. She does not have signs or symptoms of post partum depression. She denies any suicidal thoughts with a plan, intent to harm others, and delusional ideas. She does admit to having good home support. Today her lochia is light she denies any dizziness or shortness of breath.  Her bowels are regular and she denies any urinary tract symptomology. She is using abstinence and does not want further birth control. She is now at [facility] and states she is off all medications and doing well.     Her pregnancy was complicated by:  Patient Active Problem List    Diagnosis Date Noted    Post-operative state 2024    Preeclampsia w/o SF (see overview below) 2024     Overview Note:      Diagnosed in the setting of methergine (intra-op) and cocaine use with benzodiazepine withdrawal (started on POD#2 - day CIWA scoring + for withdrawal)       Benzodiazepine withdrawal (HCC) 2024    RLTCS 24 F Apg 8/9 Wt 8#0 2024    Hx CS x3 2024    Renal cyst, right 2015       Vitals:   Vitals:    24 1008 24 1021   BP: 131/83    Pulse: (!) 124 (!) 115   Weight: 52.2 kg (115 lb)          Physical Exam:  General:  no apparent distress, alert, and cooperative  Lungs:  No increased work of breathing, good air exchange, clear to auscultation bilaterally, no crackles or wheezing  Heart:  normal S1 and S2, regular rate and rhythm, and no murmurs, rubs, gallops  Abdomen: Abdomen soft, non-tender, no rebound, guarding, rigidity, BS normal. no masses  Fundus: non-tender, normal size, firm, below umbilicus  Incision: clean, dry, and

## 2024-03-04 DIAGNOSIS — O21.9 NAUSEA AND VOMITING DURING PREGNANCY: ICD-10-CM

## 2024-03-04 RX ORDER — METOCLOPRAMIDE 10 MG/1
10 TABLET ORAL 3 TIMES DAILY
Qty: 30 TABLET | Refills: 0 | Status: CANCELLED | OUTPATIENT
Start: 2024-03-04

## 2024-03-12 ENCOUNTER — NURSE TRIAGE (OUTPATIENT)
Age: 29
End: 2024-03-12

## 2024-03-12 NOTE — TELEPHONE ENCOUNTER
----- Message from Radha Mays sent at 3/12/2024  9:25 AM EDT -----  Patient is having vaginal itching and has been using Monostat with no relief. Please advise.

## 2024-03-25 PROBLEM — Z98.890 POST-OPERATIVE STATE: Status: RESOLVED | Noted: 2024-02-24 | Resolved: 2024-03-25

## 2024-04-03 ENCOUNTER — HOSPITAL ENCOUNTER (EMERGENCY)
Facility: CLINIC | Age: 29
Discharge: HOME | End: 2024-04-03
Attending: EMERGENCY MEDICINE
Payer: COMMERCIAL

## 2024-04-03 VITALS
BODY MASS INDEX: 23 KG/M2 | SYSTOLIC BLOOD PRESSURE: 113 MMHG | HEART RATE: 93 BPM | OXYGEN SATURATION: 97 % | WEIGHT: 125 LBS | RESPIRATION RATE: 18 BRPM | TEMPERATURE: 98 F | HEIGHT: 62 IN | DIASTOLIC BLOOD PRESSURE: 77 MMHG

## 2024-04-03 DIAGNOSIS — G62.9 NEUROPATHY: Primary | ICD-10-CM

## 2024-04-03 PROCEDURE — 99283 EMERGENCY DEPT VISIT LOW MDM: CPT

## 2024-04-03 RX ORDER — TRAZODONE HYDROCHLORIDE 100 MG/1
100 TABLET ORAL NIGHTLY
COMMUNITY

## 2024-04-03 RX ORDER — BUPRENORPHINE AND NALOXONE 8; 2 MG/1; MG/1
1 FILM, SOLUBLE BUCCAL; SUBLINGUAL DAILY
COMMUNITY

## 2024-04-03 RX ORDER — PREDNISONE 10 MG/1
10 TABLET ORAL SEE ADMIN INSTRUCTIONS
Qty: 17 TABLET | Refills: 0 | Status: SHIPPED | OUTPATIENT
Start: 2024-04-03 | End: 2024-04-09

## 2024-04-03 ASSESSMENT — PAIN DESCRIPTION - ORIENTATION: ORIENTATION: RIGHT

## 2024-04-03 ASSESSMENT — PAIN DESCRIPTION - PAIN TYPE: TYPE: ACUTE PAIN

## 2024-04-03 ASSESSMENT — PAIN DESCRIPTION - DESCRIPTORS: DESCRIPTORS: TINGLING;NUMBNESS;SHARP

## 2024-04-03 ASSESSMENT — PAIN DESCRIPTION - LOCATION: LOCATION: HAND

## 2024-04-03 ASSESSMENT — PAIN SCALES - GENERAL: PAINLEVEL_OUTOF10: 7

## 2024-04-03 ASSESSMENT — PAIN DESCRIPTION - FREQUENCY: FREQUENCY: INTERMITTENT

## 2024-04-03 ASSESSMENT — PAIN - FUNCTIONAL ASSESSMENT
PAIN_FUNCTIONAL_ASSESSMENT: ACTIVITIES ARE NOT PREVENTED
PAIN_FUNCTIONAL_ASSESSMENT: 0-10

## 2024-04-03 ASSESSMENT — PAIN DESCRIPTION - ONSET: ONSET: ON-GOING

## 2024-04-03 NOTE — ED PROVIDER NOTES
[facility]  [address] [address]  Phone: [phone]        [facility] ED  EMERGENCY DEPARTMENT ENCOUNTER      Pt Name: Deanna Cordova  MRN: [id number]  Birthdate 1995  Date of evaluation: 4/3/2024  Provider: Justin Diaz DO    CHIEF COMPLAINT       Chief Complaint   Patient presents with    Hand Pain    Hand Numbness         HISTORY OF PRESENT ILLNESS   (Location/Symptom, Timing/Onset,Context/Setting, Quality, Duration, Modifying Factors, Severity)  Note limiting factors.   Deanna Cordova is a 28 y.o. female who presents to the emergency department for the evaluation of hand pain/numbness.  It is the right upper extremity, she states most the time it is all 5 digits and part of her right hand, sometimes it is more of the forearm as well.  This started after her  about 2 months ago.  She was on some gabapentin after her  which seemed to help but now that she is off gabapentin she is noticing it.  No injury.  No weakness in the right upper extremity.    Nursing Notes were reviewed.    REVIEW OF SYSTEMS    (2-9systems for level 4, 10 or more for level 5)     Review of Systems   Constitutional:  Negative for fever.   Musculoskeletal:         Some swelling in right hand/fingers   Neurological:  Positive for numbness. Negative for weakness.       Except asnoted above the remainder of the review of systems was reviewed and negative.       PAST MEDICAL HISTORY     Past Medical History:   Diagnosis Date    Allergic     codeine- anaphylaxis    allergy     codeine- anaphylaxis    Anemia 11/3/2019    Pyelonephritis 3/14/2015    Pyelonephritis     Traumatic pneumothorax          SURGICAL HISTORY       Past Surgical History:   Procedure Laterality Date     SECTION       SECTION N/A 2024     SECTION performed by Tabby Murphy MD at [facility]    CYSTOSCOPY N/A 2024    CYSTOSCOPY performed by Tabby Murphy MD at [facility]

## 2024-04-03 NOTE — ED NOTES
Pt presents to ED c/o right hand pain and numbness/tingling for the past few weeks. Pt states she had a  back in February and began to experience the symptoms shortly after. Mild swelling noted of right hand but pt denies any injury. Pt also states she recently stopped taking gabapentin. Pt arrives A/Ox4, PWD, PMS intact. Pt resting on stretcher with call light in reach.

## 2024-04-11 ENCOUNTER — POSTPARTUM VISIT (OUTPATIENT)
Dept: OBGYN | Age: 29
End: 2024-04-11
Payer: COMMERCIAL

## 2024-04-11 VITALS
DIASTOLIC BLOOD PRESSURE: 77 MMHG | WEIGHT: 141 LBS | BODY MASS INDEX: 25.79 KG/M2 | HEART RATE: 106 BPM | SYSTOLIC BLOOD PRESSURE: 119 MMHG

## 2024-04-11 DIAGNOSIS — Z98.891 H/O CESAREAN SECTION: Primary | ICD-10-CM

## 2024-04-11 PROCEDURE — 99213 OFFICE O/P EST LOW 20 MIN: CPT | Performed by: OBSTETRICS & GYNECOLOGY

## 2024-04-11 RX ORDER — BUPRENORPHINE AND NALOXONE 2; .5 MG/1; MG/1
FILM, SOLUBLE BUCCAL; SUBLINGUAL
COMMUNITY
Start: 2024-04-03

## 2024-04-11 RX ORDER — ESCITALOPRAM OXALATE 20 MG/1
TABLET ORAL
COMMUNITY
Start: 2024-03-20

## 2024-04-11 RX ORDER — TRAZODONE HYDROCHLORIDE 150 MG/1
TABLET ORAL
COMMUNITY
Start: 2024-03-06 | End: 2024-04-11

## 2024-04-11 RX ORDER — HYDROXYZINE HYDROCHLORIDE 25 MG/1
TABLET, FILM COATED ORAL
COMMUNITY
Start: 2024-03-28

## 2024-04-11 RX ORDER — BUSPIRONE HYDROCHLORIDE 5 MG/1
TABLET ORAL
COMMUNITY
Start: 2024-03-19

## 2024-04-11 RX ORDER — GABAPENTIN 300 MG/1
300 CAPSULE ORAL 2 TIMES DAILY
Qty: 60 CAPSULE | Refills: 5 | Status: SHIPPED | OUTPATIENT
Start: 2024-04-11 | End: 2024-10-08

## 2024-04-11 RX ORDER — MELATONIN
COMMUNITY
Start: 2024-03-13 | End: 2024-04-11

## 2024-04-11 NOTE — PROGRESS NOTES
Postpartum Visit    Deanna Cordova is a 28 y.o. female , 6 weeks Post Partum s/p  repeat  section, low transverse incision on 24    The patient was seen. She has no chief complaint today.    She is not breast feeding and denies signs or symptoms of mastitis.  She does not have signs or symptoms of post partum depression. She denies any suicidal thoughts with a plan, intent to harm others, and delusional ideas. She does admit to having good home support. Today her lochia is light she denies any dizziness or shortness of breath.  Her bowels are regular and she denies any urinary tract symptomology. She is using abstinence and does not want further birth control. She is at [facility] until next week when she is going to transition to [facility]. She is requesting Gabapentin refill as she feels like she has \"nerve damage\" since her CD. She went to the ED earlier this week and has been referred to a Neurologist. Discussed that we can prescribe until she sees a neurologist.      Her pregnancy was complicated by:  Patient Active Problem List    Diagnosis Date Noted    Preeclampsia w/o SF (see overview below) 2024     Overview Note:      Diagnosed in the setting of methergine (intra-op) and cocaine use with benzodiazepine withdrawal (started on POD#2 - day CIWA scoring + for withdrawal)       Benzodiazepine withdrawal (HCC) 2024    RLTCS 24 F Apg 8/9 Wt 8#0 2024    Hx CS x3 2024    Renal cyst, right 2015       Vitals:   Vitals:    24 1022   BP: 119/77   Site: Right Upper Arm   Position: Standing   Cuff Size: Medium Adult   Pulse: (!) 106   Weight: 64 kg (141 lb)         Physical Exam:  General:  no apparent distress, alert, and cooperative  Lungs:  No increased work of breathing, good air exchange, clear to auscultation bilaterally, no crackles or wheezing  Heart:  normal S1 and S2, regular rate and rhythm, and no murmurs, rubs, gallops  Abdomen: Abdomen soft,

## 2024-04-24 ENCOUNTER — OFFICE VISIT (OUTPATIENT)
Dept: OBGYN CLINIC | Facility: CLINIC | Age: 29
End: 2024-04-24
Payer: COMMERCIAL

## 2024-04-24 VITALS
DIASTOLIC BLOOD PRESSURE: 80 MMHG | WEIGHT: 156.4 LBS | SYSTOLIC BLOOD PRESSURE: 120 MMHG | BODY MASS INDEX: 29.53 KG/M2 | HEIGHT: 61 IN

## 2024-04-24 DIAGNOSIS — Z01.419 WOMEN'S ANNUAL ROUTINE GYNECOLOGICAL EXAMINATION: ICD-10-CM

## 2024-04-24 DIAGNOSIS — Z11.3 SCREENING FOR STDS (SEXUALLY TRANSMITTED DISEASES): Primary | ICD-10-CM

## 2024-04-24 DIAGNOSIS — Z30.011 ENCOUNTER FOR INITIAL PRESCRIPTION OF CONTRACEPTIVE PILLS: ICD-10-CM

## 2024-04-24 DIAGNOSIS — N89.8 VAGINAL DISCHARGE: ICD-10-CM

## 2024-04-24 DIAGNOSIS — Z12.4 SCREENING FOR MALIGNANT NEOPLASM OF THE CERVIX: ICD-10-CM

## 2024-04-24 PROCEDURE — 87510 GARDNER VAG DNA DIR PROBE: CPT | Performed by: OBSTETRICS & GYNECOLOGY

## 2024-04-24 PROCEDURE — 87660 TRICHOMONAS VAGIN DIR PROBE: CPT | Performed by: OBSTETRICS & GYNECOLOGY

## 2024-04-24 PROCEDURE — 87491 CHLMYD TRACH DNA AMP PROBE: CPT | Performed by: OBSTETRICS & GYNECOLOGY

## 2024-04-24 PROCEDURE — 87591 N.GONORRHOEAE DNA AMP PROB: CPT | Performed by: OBSTETRICS & GYNECOLOGY

## 2024-04-24 PROCEDURE — G0145 SCR C/V CYTO,THINLAYER,RESCR: HCPCS | Performed by: OBSTETRICS & GYNECOLOGY

## 2024-04-24 PROCEDURE — 87480 CANDIDA DNA DIR PROBE: CPT | Performed by: OBSTETRICS & GYNECOLOGY

## 2024-04-24 PROCEDURE — 99395 PREV VISIT EST AGE 18-39: CPT | Performed by: OBSTETRICS & GYNECOLOGY

## 2024-04-24 RX ORDER — NORETHINDRONE ACETATE AND ETHINYL ESTRADIOL AND FERROUS FUMARATE 1MG-20(24)
1 KIT ORAL DAILY
Qty: 84 TABLET | Refills: 3 | Status: SHIPPED | OUTPATIENT
Start: 2024-04-24

## 2024-04-24 NOTE — PROGRESS NOTES
"Subjective      Ana Suarez is a 28 y.o. female who presents for annual well woman exam. Periods are regular every 28-30 days, lasting 4 days. No intermenstrual bleeding, spotting, or discharge.    Current contraception:  desires OCP   Risk-benefit side effect reviewed and discussed with patient in details all patient questions answered and patient was satisfied  History of abnormal Pap smear: no  Family history of uterine or ovarian cancer: no  Family history of breast cancer: yes maternal GM     Menstrual History:  OB History          3    Para        Term                AB   1    Living   1         SAB   1    IAB        Ectopic        Multiple        Live Births                      Patient's last menstrual period was 2024 (approximate).  Period Duration (Days): 5-6 days  Period Pattern: Regular  Menstrual Flow: Light  Dysmenorrhea: None    The following portions of the patient's history were reviewed and updated as appropriate: allergies, current medications, past family history, past medical history, past social history, past surgical history, and problem list.    Review of Systems  Review of Systems   Constitutional:  Negative for activity change, appetite change, chills, fatigue and fever.   Respiratory:  Negative for apnea, cough, chest tightness and shortness of breath.    Cardiovascular:  Negative for chest pain, palpitations and leg swelling.   Gastrointestinal:  Negative for abdominal pain, constipation, diarrhea, nausea and vomiting.   Genitourinary:  Negative for difficulty urinating, dysuria, flank pain, frequency, hematuria and urgency.   Neurological:  Negative for dizziness, seizures, syncope, light-headedness, numbness and headaches.   Psychiatric/Behavioral:  Negative for agitation and confusion.           Objective      /80 (BP Location: Left arm, Patient Position: Sitting, Cuff Size: Adult)   Ht 5' 1\" (1.549 m)   Wt 70.9 kg (156 lb 6.4 oz)   LMP 2024 " (Approximate)   BMI 29.55 kg/m²     Physical Exam  OBGyn Exam     General:   alert and oriented, in no acute distress, alert, appears stated age, and cooperative   Heart: regular rate and rhythm, S1, S2 normal, no murmur, click, rub or gallop   Lungs: clear to auscultation bilaterally   Abdomen: soft, non-tender, without masses or organomegaly   Vulva: normal   Vagina: normal mucosa, normal discharge   Cervix: no cervical motion tenderness and no lesions   Uterus: normal size   Adnexa:  Breast Exam:  normal adnexa  breasts appear normal, no suspicious masses, no skin or nipple changes or axillary nodes.                Assessment      @well woman@ .   28-year-old female  Annual exam  -0-3-1  Prior 2 spontaneous AB, 1 vtop   Prior  nonreassuring fetal heart rate  Desire OCP contraception  Plan  Pap/reflex  GC/CT/affirm intermittent episodes of itching and desire STD check  Diet/exercise  Calcium/vitamin D  Return to office for annual exam     All questions answered.     There are no Patient Instructions on file for this visit.

## 2024-04-26 LAB
C TRACH DNA SPEC QL NAA+PROBE: NEGATIVE
CANDIDA RRNA VAG QL PROBE: NOT DETECTED
G VAGINALIS RRNA GENITAL QL PROBE: NOT DETECTED
N GONORRHOEA DNA SPEC QL NAA+PROBE: NEGATIVE
T VAGINALIS RRNA GENITAL QL PROBE: NOT DETECTED

## 2024-05-03 LAB
LAB AP GYN PRIMARY INTERPRETATION: NORMAL
Lab: NORMAL

## 2024-05-08 ENCOUNTER — OFFICE VISIT (OUTPATIENT)
Dept: OBGYN CLINIC | Facility: CLINIC | Age: 29
End: 2024-05-08
Payer: COMMERCIAL

## 2024-05-08 VITALS
SYSTOLIC BLOOD PRESSURE: 120 MMHG | WEIGHT: 158 LBS | HEIGHT: 61 IN | BODY MASS INDEX: 29.83 KG/M2 | DIASTOLIC BLOOD PRESSURE: 70 MMHG

## 2024-05-08 DIAGNOSIS — N76.0 ACUTE VAGINITIS: Primary | ICD-10-CM

## 2024-05-08 PROCEDURE — 87660 TRICHOMONAS VAGIN DIR PROBE: CPT | Performed by: PHYSICIAN ASSISTANT

## 2024-05-08 PROCEDURE — 99213 OFFICE O/P EST LOW 20 MIN: CPT | Performed by: PHYSICIAN ASSISTANT

## 2024-05-08 PROCEDURE — 87480 CANDIDA DNA DIR PROBE: CPT | Performed by: PHYSICIAN ASSISTANT

## 2024-05-08 PROCEDURE — 87510 GARDNER VAG DNA DIR PROBE: CPT | Performed by: PHYSICIAN ASSISTANT

## 2024-05-08 NOTE — PROGRESS NOTES
"Assessment/Plan:    No problem-specific Assessment & Plan notes found for this encounter.       Diagnoses and all orders for this visit:    Acute vaginitis  -     VAGINOSIS DNA PROBE        Vaginal cultures done; we will call with results.  Can start OTC Monistat 7 day vaginal cream.  F/u for yearly gyn exam.  Call if symptoms worsen or change.    Subjective:      Patient ID: Ana Suarez is a 28 y.o. female.    Patient is here with complaint of possible vaginal infection.  Seen with her partner present.  States symptoms started this morning.  She is having yellow discharge and vulvovaginal itching.  Denies urinary symptoms, vaginal odor, pelvic pain, abdominal pain, n/v, and fever/chills.  GC/chlamydia screening on 4/24 was negative.        The following portions of the patient's history were reviewed and updated as appropriate: allergies, current medications, past family history, past medical history, past social history, past surgical history, and problem list.    Review of Systems   Constitutional:  Negative for chills and fever.   Gastrointestinal:  Negative for abdominal distention, abdominal pain, nausea and vomiting.   Genitourinary:  Positive for vaginal discharge. Negative for difficulty urinating, dysuria, frequency, genital sores, hematuria, menstrual problem, pelvic pain, urgency, vaginal bleeding and vaginal pain.        Vulvovaginal itching         Objective:      /70 (BP Location: Left arm, Patient Position: Sitting, Cuff Size: Adult)   Ht 5' 1\" (1.549 m)   Wt 71.7 kg (158 lb)   LMP 04/17/2024 (Approximate)   BMI 29.85 kg/m²          Physical Exam  Vitals reviewed. Exam conducted with a chaperone present.   Constitutional:       Appearance: Normal appearance. She is well-developed.   Genitourinary:     General: Normal vulva.      Pubic Area: No rash.       Labia:         Right: No rash, tenderness, lesion or injury.         Left: No rash, tenderness, lesion or injury.       Vagina: " Vaginal discharge (Moderate, yellow, curd-like) and erythema (Mild) present. No tenderness or bleeding.      Cervix: Normal.      Uterus: Normal.       Adnexa: Right adnexa normal and left adnexa normal.        Right: No mass, tenderness or fullness.          Left: No mass, tenderness or fullness.     Lymphadenopathy:      Lower Body: No right inguinal adenopathy. No left inguinal adenopathy.   Skin:     General: Skin is warm and dry.   Neurological:      Mental Status: She is alert and oriented to person, place, and time.   Psychiatric:         Mood and Affect: Mood normal.         Behavior: Behavior normal. Behavior is cooperative.         Thought Content: Thought content normal.         Judgment: Judgment normal.

## 2024-05-10 ENCOUNTER — TELEPHONE (OUTPATIENT)
Age: 29
End: 2024-05-10

## 2024-05-10 ENCOUNTER — TELEPHONE (OUTPATIENT)
Dept: OBGYN CLINIC | Facility: CLINIC | Age: 29
End: 2024-05-10

## 2024-05-10 DIAGNOSIS — N76.0 BV (BACTERIAL VAGINOSIS): Primary | ICD-10-CM

## 2024-05-10 DIAGNOSIS — B96.89 BV (BACTERIAL VAGINOSIS): Primary | ICD-10-CM

## 2024-05-10 LAB
CANDIDA RRNA VAG QL PROBE: DETECTED
G VAGINALIS RRNA GENITAL QL PROBE: DETECTED
T VAGINALIS RRNA GENITAL QL PROBE: NOT DETECTED

## 2024-05-10 RX ORDER — METRONIDAZOLE 500 MG/1
500 TABLET ORAL EVERY 12 HOURS SCHEDULED
Qty: 14 TABLET | Refills: 0 | Status: SHIPPED | OUTPATIENT
Start: 2024-05-10 | End: 2024-05-17

## 2024-05-10 NOTE — TELEPHONE ENCOUNTER
Spoke with patient regarding vaginal culture results - positive for BV and Candida.  Rx for metronidazole 500 mg BID x 7 days sent to pharmacy.  Patient to use OTC Monistat 7 day vaginal cream for yeast infection.  Call if symptoms do not resolve.

## 2024-05-10 NOTE — TELEPHONE ENCOUNTER
Pt called to inquire if additional lab tests for chlamydia and gonorrhea were in. Communicated results were not yet received.

## 2024-05-22 ENCOUNTER — TELEPHONE (OUTPATIENT)
Dept: OBGYN | Age: 29
End: 2024-05-22

## 2024-05-22 NOTE — TELEPHONE ENCOUNTER
Patient came to the office window requesting an increase in her Gabapentin. Patient states it works for about 30 minutes and then her hands go numb and cannot hold her baby.    Please advise.

## 2024-05-24 NOTE — TELEPHONE ENCOUNTER
Patient states she does have an appointment scheduled but they are scheduling 6 months out and her appointment is 11/6

## 2024-05-29 NOTE — TELEPHONE ENCOUNTER
Left voice message  for patient to call the office.  Letter also mailed to patient.  Will await patient contact

## 2024-06-01 ENCOUNTER — HOSPITAL ENCOUNTER (EMERGENCY)
Facility: HOSPITAL | Age: 29
Discharge: HOME | End: 2024-06-01
Attending: EMERGENCY MEDICINE
Payer: COMMERCIAL

## 2024-06-01 VITALS
HEART RATE: 99 BPM | DIASTOLIC BLOOD PRESSURE: 82 MMHG | TEMPERATURE: 98.6 F | SYSTOLIC BLOOD PRESSURE: 130 MMHG | OXYGEN SATURATION: 100 % | RESPIRATION RATE: 18 BRPM | BODY MASS INDEX: 28.34 KG/M2 | WEIGHT: 150 LBS

## 2024-06-01 DIAGNOSIS — J02.9 SORE THROAT: Primary | ICD-10-CM

## 2024-06-01 LAB
FLUAV RNA RESP QL NAA+PROBE: NEGATIVE
FLUBV RNA RESP QL NAA+PROBE: NEGATIVE
RSV RNA RESP QL NAA+PROBE: NEGATIVE
S PYO DNA THROAT QL NAA+PROBE: DETECTED
SARS-COV-2 RNA RESP QL NAA+PROBE: NEGATIVE

## 2024-06-01 PROCEDURE — 99284 EMERGENCY DEPT VISIT MOD MDM: CPT | Performed by: EMERGENCY MEDICINE

## 2024-06-01 PROCEDURE — 87651 STREP A DNA AMP PROBE: CPT | Performed by: EMERGENCY MEDICINE

## 2024-06-01 PROCEDURE — 0241U HB NFCT DS VIR RESP RNA 4 TRGT: CPT | Performed by: EMERGENCY MEDICINE

## 2024-06-01 PROCEDURE — 99283 EMERGENCY DEPT VISIT LOW MDM: CPT

## 2024-06-01 RX ORDER — IBUPROFEN 600 MG/1
600 TABLET ORAL ONCE
Status: COMPLETED | OUTPATIENT
Start: 2024-06-01 | End: 2024-06-01

## 2024-06-01 RX ADMIN — IBUPROFEN 600 MG: 600 TABLET, FILM COATED ORAL at 21:09

## 2024-06-02 DIAGNOSIS — J02.0 STREP PHARYNGITIS: Primary | ICD-10-CM

## 2024-06-02 RX ORDER — AMOXICILLIN 500 MG/1
500 TABLET, FILM COATED ORAL 2 TIMES DAILY
Qty: 20 TABLET | Refills: 0 | Status: SHIPPED | OUTPATIENT
Start: 2024-06-02 | End: 2024-06-12

## 2024-06-02 NOTE — ED PROVIDER NOTES
History  Chief Complaint   Patient presents with    Sore Throat     Pt c/o sore throat x3 days.      28-year-old female presents with 3 days of sore throat.  No fevers.  No cough or nasal congestion.  No sick contacts.  She would like viral and strep testing.        Prior to Admission Medications   Prescriptions Last Dose Informant Patient Reported? Taking?   levonorgestrel-ethinyl estradiol (Larissia) 0.1-20 MG-MCG per tablet   No No   Sig: Take 1 tablet by mouth daily   Patient not taking: Reported on 2024   metoclopramide (Reglan) 10 mg tablet   No No   Sig: Take 1 tablet (10 mg total) by mouth 3 (three) times a day   Patient not taking: Reported on 2024   nitrofurantoin (MACROBID) 100 mg capsule   Yes No   Sig: Take 100 mg by mouth 2 (two) times a day   Patient not taking: Reported on 2024   norethindrone-ethinyl estradiol-ferrous fumarate (Junel Fe 24) 1-20 MG-MCG(24) per tablet   No No   Sig: Take 1 tablet by mouth daily   ondansetron (Zofran ODT) 4 mg disintegrating tablet   No No   Sig: Take 1 tablet (4 mg total) by mouth every 6 (six) hours as needed for nausea or vomiting   Patient not taking: Reported on 2024   phenazopyridine (PYRIDIUM) 200 mg tablet   Yes No   Sig: Take 200 mg by mouth 3 (three) times a day   Patient not taking: Reported on 2024      Facility-Administered Medications: None       Past Medical History:   Diagnosis Date         Miscarriage 2017    miscarriage x1    Urinary tract infection     Hx UTI x1 in the past    Varicella     recv'd vaccine       Past Surgical History:   Procedure Laterality Date    ELBOW SURGERY Right     ELBOW SURGERY Right     from elbow and arm fracture    FRACTURE SURGERY      INDUCED          Family History   Problem Relation Age of Onset    Cancer Mother     No Known Problems Father     No Known Problems Sister     No Known Problems Brother     Diabetes Paternal Grandfather     Breast cancer Family      I have  reviewed and agree with the history as documented.    E-Cigarette/Vaping    E-Cigarette Use Never User      E-Cigarette/Vaping Substances    Nicotine No     THC No     CBD No     Flavoring No     Other No     Unknown No      Social History     Tobacco Use    Smoking status: Former    Smokeless tobacco: Never   Vaping Use    Vaping status: Never Used   Substance Use Topics    Alcohol use: Yes     Comment: occasioally    Drug use: No       Review of Systems   All other systems reviewed and are negative.      Physical Exam  Physical Exam  Constitutional:       General: She is not in acute distress.  HENT:      Head: Normocephalic.      Nose: No congestion.      Mouth/Throat:      Mouth: Mucous membranes are moist.      Pharynx: Uvula midline. Pharyngeal swelling and posterior oropharyngeal erythema present. No oropharyngeal exudate.   Eyes:      Conjunctiva/sclera: Conjunctivae normal.   Cardiovascular:      Rate and Rhythm: Normal rate.   Pulmonary:      Effort: Pulmonary effort is normal.   Skin:     General: Skin is warm and dry.   Neurological:      Mental Status: She is alert.         Vital Signs  ED Triage Vitals [06/01/24 2046]   Temperature Pulse Respirations Blood Pressure SpO2   98.6 °F (37 °C) 99 18 130/82 100 %      Temp Source Heart Rate Source Patient Position - Orthostatic VS BP Location FiO2 (%)   Tympanic Monitor Sitting Left arm --      Pain Score       6           Vitals:    06/01/24 2046   BP: 130/82   Pulse: 99   Patient Position - Orthostatic VS: Sitting         Visual Acuity      ED Medications  Medications   ibuprofen (MOTRIN) tablet 600 mg (600 mg Oral Given 6/1/24 2109)       Diagnostic Studies  Results Reviewed       Procedure Component Value Units Date/Time    FLU/RSV/COVID - if FLU/RSV clinically relevant [[id number]] Collected: 06/01/24 2106    Lab Status: In process Specimen: Nares from Nose Updated: 06/01/24 2109    Strep A PCR [[id number]] Collected: 06/01/24 2106    Lab Status: In  process Specimen: Throat Updated: 06/01/24 2109                   No orders to display              Procedures  Procedures         ED Course       28-year-old female with 3 days of sore throat.  She has erythematous mildly enlarged tonsils without exudates or asymmetry.  Will perform strep and viral testing per patient request.  She does not wish to wait in the ED for results.  Stable for discharge.                                      Medical Decision Making  Amount and/or Complexity of Data Reviewed  Labs: ordered.    Risk  Prescription drug management.             Disposition  Final diagnoses:   Sore throat     Time reflects when diagnosis was documented in both MDM as applicable and the Disposition within this note       Time User Action Codes Description Comment    6/1/2024  9:03 PM Norma Peterson Add [J02.9] Sore throat           ED Disposition       ED Disposition   Discharge    Condition   Stable    Date/Time   Sat Jun 1, 2024  9:03 PM    Comment   Ana Suarez discharge to home/self care.                   Follow-up Information    None         Patient's Medications   Discharge Prescriptions    No medications on file       No discharge procedures on file.    PDMP Review       None            ED Provider  Electronically Signed by             Norma Peterson MD  06/01/24 3002

## 2024-06-04 ENCOUNTER — HOSPITAL ENCOUNTER (EMERGENCY)
Facility: HOSPITAL | Age: 29
Discharge: HOME | End: 2024-06-04
Attending: EMERGENCY MEDICINE
Payer: COMMERCIAL

## 2024-06-04 VITALS
DIASTOLIC BLOOD PRESSURE: 71 MMHG | SYSTOLIC BLOOD PRESSURE: 128 MMHG | RESPIRATION RATE: 16 BRPM | OXYGEN SATURATION: 98 % | HEART RATE: 95 BPM | TEMPERATURE: 98.5 F

## 2024-06-04 DIAGNOSIS — J02.0 STREP PHARYNGITIS: Primary | ICD-10-CM

## 2024-06-04 PROCEDURE — 99282 EMERGENCY DEPT VISIT SF MDM: CPT

## 2024-06-04 PROCEDURE — 99284 EMERGENCY DEPT VISIT MOD MDM: CPT | Performed by: EMERGENCY MEDICINE

## 2024-06-04 RX ORDER — DEXAMETHASONE 4 MG/1
8 TABLET ORAL ONCE
Status: COMPLETED | OUTPATIENT
Start: 2024-06-04 | End: 2024-06-04

## 2024-06-04 RX ADMIN — DEXAMETHASONE 8 MG: 4 TABLET ORAL at 11:24

## 2024-06-04 NOTE — Clinical Note
Ana Suarez was seen and treated in our emergency department on 6/4/2024.                Diagnosis: strep throat    Ana  may return to work on return date.    She may return on this date: 06/06/2024         If you have any questions or concerns, please don't hesitate to call.      Norma Peterson MD    ______________________________           _______________          _______________  Hospital Representative                              Date                                Time

## 2024-06-10 NOTE — ED PROVIDER NOTES
History  Chief Complaint   Patient presents with    Earache     Pt presents to the ed with a sore throat that started three days ago, reports not getting any better, took antibiotic this morning, reports left earache as well     28-year-old female recently diagnosed with strep throat has been taking amoxicillin for 2 to 3 days still complaining of sore throat.  Also left-sided ear pain.        Prior to Admission Medications   Prescriptions Last Dose Informant Patient Reported? Taking?   amoxicillin (AMOXIL) 500 MG tablet   No No   Sig: Take 1 tablet (500 mg total) by mouth 2 (two) times a day for 10 days   levonorgestrel-ethinyl estradiol (Larissia) 0.1-20 MG-MCG per tablet   No No   Sig: Take 1 tablet by mouth daily   Patient not taking: Reported on 2024   metoclopramide (Reglan) 10 mg tablet   No No   Sig: Take 1 tablet (10 mg total) by mouth 3 (three) times a day   Patient not taking: Reported on 2024   nitrofurantoin (MACROBID) 100 mg capsule   Yes No   Sig: Take 100 mg by mouth 2 (two) times a day   Patient not taking: Reported on 2024   norethindrone-ethinyl estradiol-ferrous fumarate (Junel Fe 24) 1-20 MG-MCG(24) per tablet   No No   Sig: Take 1 tablet by mouth daily   ondansetron (Zofran ODT) 4 mg disintegrating tablet   No No   Sig: Take 1 tablet (4 mg total) by mouth every 6 (six) hours as needed for nausea or vomiting   Patient not taking: Reported on 2024   phenazopyridine (PYRIDIUM) 200 mg tablet   Yes No   Sig: Take 200 mg by mouth 3 (three) times a day   Patient not taking: Reported on 2024      Facility-Administered Medications: None       Past Medical History:   Diagnosis Date         Miscarriage 2017    miscarriage x1    Urinary tract infection     Hx UTI x1 in the past    Varicella     recv'd vaccine       Past Surgical History:   Procedure Laterality Date    ELBOW SURGERY Right     ELBOW SURGERY Right     from elbow and arm fracture    FRACTURE SURGERY       INDUCED          Family History   Problem Relation Age of Onset    Cancer Mother     No Known Problems Father     No Known Problems Sister     No Known Problems Brother     Diabetes Paternal Grandfather     Breast cancer Family      I have reviewed and agree with the history as documented.    E-Cigarette/Vaping    E-Cigarette Use Never User      E-Cigarette/Vaping Substances    Nicotine No     THC No     CBD No     Flavoring No     Other No     Unknown No      Social History     Tobacco Use    Smoking status: Former    Smokeless tobacco: Never   Vaping Use    Vaping status: Never Used   Substance Use Topics    Alcohol use: Yes     Comment: occasioally    Drug use: No       Review of Systems   All other systems reviewed and are negative.      Physical Exam  Physical Exam  HENT:      Head: Normocephalic.      Right Ear: Tympanic membrane normal.      Left Ear: Tympanic membrane normal.      Nose: Nose normal.      Mouth/Throat:      Mouth: Mucous membranes are moist.      Pharynx: Oropharyngeal exudate and posterior oropharyngeal erythema present.   Eyes:      Conjunctiva/sclera: Conjunctivae normal.   Cardiovascular:      Rate and Rhythm: Normal rate.   Pulmonary:      Effort: Pulmonary effort is normal.   Musculoskeletal:         General: Normal range of motion.   Skin:     General: Skin is warm and dry.      Findings: No rash.   Neurological:      General: No focal deficit present.      Mental Status: She is alert.   Psychiatric:         Mood and Affect: Mood normal.         Behavior: Behavior normal.         Vital Signs  ED Triage Vitals [24 1054]   Temperature Pulse Respirations Blood Pressure SpO2   98.5 °F (36.9 °C) 95 16 128/71 98 %      Temp Source Heart Rate Source Patient Position - Orthostatic VS BP Location FiO2 (%)   Oral Monitor Sitting Right arm --      Pain Score       --           Vitals:    24 1054   BP: 128/71   Pulse: 95   Patient Position - Orthostatic VS: Sitting          Visual Acuity      ED Medications  Medications   dexamethasone (DECADRON) tablet 8 mg (8 mg Oral Given 6/4/24 1124)       Diagnostic Studies  Results Reviewed       None                   No orders to display              Procedures  Procedures         ED Course       28-year-old female with persistent sore throat after couple days of amoxicillin for strep throat.  Also complaining of ear pain.  Patient has normal appearing ear canals and TMs.  Suspect ear pain is referred from tonsillar pain.  Her tonsils are erythematous with exudates minimal swelling.  Will give one-time dose of Decadron for symptom management.  Advised to continue antibiotics and take Tylenol and Motrin regularly for pain.                        SBIRT 20yo+      Flowsheet Row Most Recent Value   Initial Alcohol Screen: US AUDIT-C     1. How often do you have a drink containing alcohol? 0 Filed at: 06/04/2024 1058   2. How many drinks containing alcohol do you have on a typical day you are drinking?  0 Filed at: 06/04/2024 1058   3a. Male UNDER 65: How often do you have five or more drinks on one occasion? 0 Filed at: 06/04/2024 1058   3b. FEMALE Any Age, or MALE 65+: How often do you have 4 or more drinks on one occassion? 0 Filed at: 06/04/2024 1058   Audit-C Score 0 Filed at: 06/04/2024 1058   EUNICE: How many times in the past year have you...    Used an illegal drug or used a prescription medication for non-medical reasons? Never Filed at: 06/04/2024 1058                      Medical Decision Making  Risk  Prescription drug management.             Disposition  Final diagnoses:   Strep pharyngitis     Time reflects when diagnosis was documented in both MDM as applicable and the Disposition within this note       Time User Action Codes Description Comment    6/4/2024 11:15 AM Norma Peterson [J02.0] Strep pharyngitis           ED Disposition       ED Disposition   Discharge    Condition   Stable    Date/Time   Tue Jun 4, 2024 1115     Comment   Ana Suarez discharge to home/self care.                   Follow-up Information    None         Discharge Medication List as of 6/4/2024 11:15 AM        CONTINUE these medications which have NOT CHANGED    Details   metoclopramide (Reglan) 10 mg tablet Take 1 tablet (10 mg total) by mouth 3 (three) times a day, Starting Mon 2/20/2023, Normal      amoxicillin (AMOXIL) 500 MG tablet Take 1 tablet (500 mg total) by mouth 2 (two) times a day for 10 days, Starting Sun 6/2/2024, Until Wed 6/12/2024, Normal      levonorgestrel-ethinyl estradiol (Larissia) 0.1-20 MG-MCG per tablet Take 1 tablet by mouth daily, Starting Wed 10/19/2022, Normal      nitrofurantoin (MACROBID) 100 mg capsule Take 100 mg by mouth 2 (two) times a day, Starting Tue 11/15/2022, Historical Med      norethindrone-ethinyl estradiol-ferrous fumarate (Junel Fe 24) 1-20 MG-MCG(24) per tablet Take 1 tablet by mouth daily, Starting Wed 4/24/2024, Normal      ondansetron (Zofran ODT) 4 mg disintegrating tablet Take 1 tablet (4 mg total) by mouth every 6 (six) hours as needed for nausea or vomiting, Starting Mon 1/3/2022, Normal      phenazopyridine (PYRIDIUM) 200 mg tablet Take 200 mg by mouth 3 (three) times a day, Starting Tue 11/15/2022, Historical Med             No discharge procedures on file.    PDMP Review       None            ED Provider  Electronically Signed by             Norma Peterson MD  06/10/24 0716

## 2024-08-22 ENCOUNTER — OFFICE VISIT (OUTPATIENT)
Dept: OBGYN CLINIC | Facility: CLINIC | Age: 29
End: 2024-08-22
Payer: COMMERCIAL

## 2024-08-22 VITALS
BODY MASS INDEX: 29.64 KG/M2 | SYSTOLIC BLOOD PRESSURE: 100 MMHG | HEIGHT: 61 IN | DIASTOLIC BLOOD PRESSURE: 60 MMHG | WEIGHT: 157 LBS

## 2024-08-22 DIAGNOSIS — N76.0 ACUTE VAGINITIS: ICD-10-CM

## 2024-08-22 DIAGNOSIS — N89.8 VAGINAL DISCHARGE: ICD-10-CM

## 2024-08-22 DIAGNOSIS — O03.9 MISCARRIAGE: Primary | ICD-10-CM

## 2024-08-22 PROCEDURE — 87510 GARDNER VAG DNA DIR PROBE: CPT | Performed by: OBSTETRICS & GYNECOLOGY

## 2024-08-22 PROCEDURE — 87480 CANDIDA DNA DIR PROBE: CPT | Performed by: OBSTETRICS & GYNECOLOGY

## 2024-08-22 PROCEDURE — 99213 OFFICE O/P EST LOW 20 MIN: CPT | Performed by: OBSTETRICS & GYNECOLOGY

## 2024-08-22 PROCEDURE — 87660 TRICHOMONAS VAGIN DIR PROBE: CPT | Performed by: OBSTETRICS & GYNECOLOGY

## 2024-08-22 NOTE — PROGRESS NOTES
"Assessment/Plan:     There are no diagnoses linked to this encounter.    28-year-old female  -0-3-1  Prior 2 spontaneous AB, 1 vtop   Prior  nonreassuring fetal heart rate  Recent miscarriage   Blood type A neg  Plan  Affirm obtained to check for any infection  To go to the ER for RhoGAM injection  To go for quantitative hCG to be done today and Monday  Return to office in 2 weeks for follow-up repeat ultrasound            Subjective:      Patient ID: Ana Suarez is a 28 y.o. female.    HPI  29 yo female here today follow up on miscarriage  Lmp 2024 patient did pregnancy test at home and came back positive  ON  started bleeding was light then become heavy   Pass clots and tissue   Still feel pregnancy symptom breast tenderness and nausea and urgency   Patient also complaining of vaginal brown discharge  Denies any dysuria  Denies any pelvic pain  Denies any heavy vaginal bleeding currently      The following portions of the patient's history were reviewed and updated as appropriate: allergies, current medications, past family history, past medical history, past social history, past surgical history and problem list.    Review of Systems      Objective:      /60 (BP Location: Left arm, Patient Position: Sitting, Cuff Size: Adult)   Ht 5' 1\" (1.549 m)   Wt 71.2 kg (157 lb)   LMP  (LMP Unknown)   BMI 29.66 kg/m²          Physical Exam  Constitutional:       Appearance: She is well-developed.   Abdominal:      General: There is no distension.      Palpations: Abdomen is soft.      Tenderness: There is no abdominal tenderness.   Genitourinary:     Labia:         Right: No rash, tenderness or lesion.         Left: No rash, tenderness or lesion.       Vagina: No signs of injury. No vaginal discharge, erythema or tenderness.      Cervix: No cervical motion tenderness, discharge or friability.      Adnexa:         Right: No mass, tenderness or fullness.          Left: No mass, tenderness " or fullness.        Comments: Brown discharge noted vaginal culture obtained  Bedside ultrasound performed small sac noted within the uterine cavity irregular border    Neurological:      Mental Status: She is alert and oriented to person, place, and time.   Psychiatric:         Behavior: Behavior normal.

## 2024-08-23 ENCOUNTER — PATIENT MESSAGE (OUTPATIENT)
Dept: OBGYN CLINIC | Facility: CLINIC | Age: 29
End: 2024-08-23

## 2024-08-23 ENCOUNTER — HOSPITAL ENCOUNTER (EMERGENCY)
Facility: HOSPITAL | Age: 29
Discharge: HOME | End: 2024-08-23
Attending: EMERGENCY MEDICINE
Payer: COMMERCIAL

## 2024-08-23 ENCOUNTER — TELEPHONE (OUTPATIENT)
Age: 29
End: 2024-08-23

## 2024-08-23 VITALS
TEMPERATURE: 98.3 F | WEIGHT: 156.3 LBS | OXYGEN SATURATION: 100 % | RESPIRATION RATE: 20 BRPM | HEART RATE: 81 BPM | DIASTOLIC BLOOD PRESSURE: 56 MMHG | BODY MASS INDEX: 29.53 KG/M2 | SYSTOLIC BLOOD PRESSURE: 114 MMHG

## 2024-08-23 DIAGNOSIS — B37.31 CANDIDA VAGINITIS: Primary | ICD-10-CM

## 2024-08-23 DIAGNOSIS — O03.9 MISCARRIAGE: Primary | ICD-10-CM

## 2024-08-23 LAB
ABO GROUP BLD: NORMAL
ANION GAP SERPL CALCULATED.3IONS-SCNC: 9 MMOL/L (ref 4–13)
B-HCG SERPL-ACNC: ABNORMAL MIU/ML (ref 0–5)
BASOPHILS # BLD AUTO: 0.02 THOUSANDS/ÂΜL (ref 0–0.1)
BASOPHILS NFR BLD AUTO: 0 % (ref 0–1)
BLD GP AB SCN SERPL QL: NEGATIVE
BUN SERPL-MCNC: 10 MG/DL (ref 5–25)
CALCIUM SERPL-MCNC: 9.4 MG/DL (ref 8.4–10.2)
CANDIDA RRNA VAG QL PROBE: DETECTED
CHLORIDE SERPL-SCNC: 104 MMOL/L (ref 96–108)
CO2 SERPL-SCNC: 21 MMOL/L (ref 21–32)
CREAT SERPL-MCNC: 0.84 MG/DL (ref 0.6–1.3)
EOSINOPHIL # BLD AUTO: 0.18 THOUSAND/ÂΜL (ref 0–0.61)
EOSINOPHIL NFR BLD AUTO: 2 % (ref 0–6)
ERYTHROCYTE [DISTWIDTH] IN BLOOD BY AUTOMATED COUNT: 13.2 % (ref 11.6–15.1)
G VAGINALIS RRNA GENITAL QL PROBE: NOT DETECTED
GFR SERPL CREATININE-BSD FRML MDRD: 95 ML/MIN/1.73SQ M
GLUCOSE SERPL-MCNC: 109 MG/DL (ref 65–140)
HCT VFR BLD AUTO: 31.7 % (ref 34.8–46.1)
HGB BLD-MCNC: 10.6 G/DL (ref 11.5–15.4)
IMM GRANULOCYTES # BLD AUTO: 0.04 THOUSAND/UL (ref 0–0.2)
IMM GRANULOCYTES NFR BLD AUTO: 0 % (ref 0–2)
LYMPHOCYTES # BLD AUTO: 1.9 THOUSANDS/ÂΜL (ref 0.6–4.47)
LYMPHOCYTES NFR BLD AUTO: 17 % (ref 14–44)
MCH RBC QN AUTO: 30 PG (ref 26.8–34.3)
MCHC RBC AUTO-ENTMCNC: 33.4 G/DL (ref 31.4–37.4)
MCV RBC AUTO: 90 FL (ref 82–98)
MONOCYTES # BLD AUTO: 0.5 THOUSAND/ÂΜL (ref 0.17–1.22)
MONOCYTES NFR BLD AUTO: 5 % (ref 4–12)
NEUTROPHILS # BLD AUTO: 8.53 THOUSANDS/ÂΜL (ref 1.85–7.62)
NEUTS SEG NFR BLD AUTO: 76 % (ref 43–75)
NRBC BLD AUTO-RTO: 0 /100 WBCS
PLATELET # BLD AUTO: 269 THOUSANDS/UL (ref 149–390)
PMV BLD AUTO: 10.5 FL (ref 8.9–12.7)
POTASSIUM SERPL-SCNC: 3.9 MMOL/L (ref 3.5–5.3)
RBC # BLD AUTO: 3.53 MILLION/UL (ref 3.81–5.12)
RH BLD: NEGATIVE
SODIUM SERPL-SCNC: 134 MMOL/L (ref 135–147)
T VAGINALIS RRNA GENITAL QL PROBE: NOT DETECTED
WBC # BLD AUTO: 11.17 THOUSAND/UL (ref 4.31–10.16)

## 2024-08-23 PROCEDURE — 96372 THER/PROPH/DIAG INJ SC/IM: CPT

## 2024-08-23 PROCEDURE — 36415 COLL VENOUS BLD VENIPUNCTURE: CPT | Performed by: EMERGENCY MEDICINE

## 2024-08-23 PROCEDURE — 85025 COMPLETE CBC W/AUTO DIFF WBC: CPT | Performed by: EMERGENCY MEDICINE

## 2024-08-23 PROCEDURE — 86900 BLOOD TYPING SEROLOGIC ABO: CPT | Performed by: EMERGENCY MEDICINE

## 2024-08-23 PROCEDURE — 86850 RBC ANTIBODY SCREEN: CPT | Performed by: EMERGENCY MEDICINE

## 2024-08-23 PROCEDURE — 99283 EMERGENCY DEPT VISIT LOW MDM: CPT

## 2024-08-23 PROCEDURE — 86901 BLOOD TYPING SEROLOGIC RH(D): CPT | Performed by: EMERGENCY MEDICINE

## 2024-08-23 PROCEDURE — 84702 CHORIONIC GONADOTROPIN TEST: CPT | Performed by: EMERGENCY MEDICINE

## 2024-08-23 PROCEDURE — 99284 EMERGENCY DEPT VISIT MOD MDM: CPT | Performed by: EMERGENCY MEDICINE

## 2024-08-23 PROCEDURE — 80048 BASIC METABOLIC PNL TOTAL CA: CPT | Performed by: EMERGENCY MEDICINE

## 2024-08-23 RX ORDER — CLOTRIMAZOLE 1 %
1 CREAM WITH APPLICATOR VAGINAL
Qty: 45 G | Refills: 0 | Status: SHIPPED | OUTPATIENT
Start: 2024-08-23 | End: 2024-08-30

## 2024-08-23 RX ADMIN — HUMAN RHO(D) IMMUNE GLOBULIN 300 MCG: 300 INJECTION, SOLUTION INTRAMUSCULAR at 12:49

## 2024-08-23 NOTE — ED PROVIDER NOTES
History  Chief Complaint   Patient presents with    Evaluation of Abnormal Diagnostic Test     Recent miscarriage, sent in by provider for hcg levels, US performed, may be retaining products. Sent in to see if levels are dropping. Also needs rhogam here     28-year-old  female presents to the emergency department for evaluation.  Patient reports that she saw her OB/GYN yesterday and was diagnosed with a miscarriage.  Patient states that she was told to come to the emergency department to receive a RhoGAM injection.  The patient reports that her blood type is A- and she has received RhoGAM in the past.  She reports that her last menstrual period was on 2024.  States that approximately 9 days ago she developed vaginal bleeding and was passing clots and tissue.  Per chart review patient was seen by her OB/GYN yesterday.  They recommended that she go to the emergency department yesterday for a RhoGAM injection and to have an hCG yesterday as well as a repeat in 4 days.  She denies fevers, chills, abdominal pain and urinary symptoms.        Prior to Admission Medications   Prescriptions Last Dose Informant Patient Reported? Taking?   clotrimazole (GYNE-LOTRIMIN) 1 % vaginal cream   No No   Sig: Insert 1 applicator into the vagina daily at bedtime for 7 days   levonorgestrel-ethinyl estradiol (Larissia) 0.1-20 MG-MCG per tablet   No No   Sig: Take 1 tablet by mouth daily   metoclopramide (Reglan) 10 mg tablet   No No   Sig: Take 1 tablet (10 mg total) by mouth 3 (three) times a day   nitrofurantoin (MACROBID) 100 mg capsule   Yes No   Sig: Take 100 mg by mouth 2 (two) times a day   norethindrone-ethinyl estradiol-ferrous fumarate (Junel Fe 24) 1-20 MG-MCG(24) per tablet   No No   Sig: Take 1 tablet by mouth daily   ondansetron (Zofran ODT) 4 mg disintegrating tablet   No No   Sig: Take 1 tablet (4 mg total) by mouth every 6 (six) hours as needed for nausea or vomiting   phenazopyridine (PYRIDIUM) 200 mg tablet    Yes No   Sig: Take 200 mg by mouth 3 (three) times a day      Facility-Administered Medications: None       Past Medical History:   Diagnosis Date         Miscarriage 2017    miscarriage x1    Urinary tract infection     Hx UTI x1 in the past    Varicella     recv'd vaccine       Past Surgical History:   Procedure Laterality Date     SECTION      ELBOW SURGERY Right     ELBOW SURGERY Right 2006    from elbow and arm fracture    FRACTURE SURGERY      INDUCED          Family History   Problem Relation Age of Onset    Cancer Mother     No Known Problems Father     No Known Problems Sister     No Known Problems Brother     Diabetes Paternal Grandfather     Breast cancer Family      I have reviewed and agree with the history as documented.    E-Cigarette/Vaping    E-Cigarette Use Never User      E-Cigarette/Vaping Substances    Nicotine No     THC No     CBD No     Flavoring No     Other No     Unknown No      Social History     Tobacco Use    Smoking status: Former    Smokeless tobacco: Never   Vaping Use    Vaping status: Never Used   Substance Use Topics    Alcohol use: Yes     Comment: occasioally    Drug use: No       Review of Systems   Constitutional:  Negative for chills and fever.   HENT:  Negative for ear pain and sore throat.    Eyes:  Negative for pain and visual disturbance.   Respiratory:  Negative for cough and shortness of breath.    Cardiovascular:  Negative for chest pain and palpitations.   Gastrointestinal:  Negative for abdominal pain and vomiting.   Genitourinary:  Positive for vaginal bleeding. Negative for dysuria and hematuria.   Musculoskeletal:  Negative for arthralgias and back pain.   Skin:  Negative for color change and rash.   Neurological:  Negative for seizures and syncope.   All other systems reviewed and are negative.      Physical Exam  Physical Exam  Vitals and nursing note reviewed.   Constitutional:       General: She is not in acute distress.      Appearance: She is well-developed.   HENT:      Head: Normocephalic and atraumatic.   Eyes:      Conjunctiva/sclera: Conjunctivae normal.   Cardiovascular:      Rate and Rhythm: Normal rate and regular rhythm.      Heart sounds: No murmur heard.  Pulmonary:      Effort: Pulmonary effort is normal. No respiratory distress.      Breath sounds: Normal breath sounds.   Abdominal:      Palpations: Abdomen is soft.      Tenderness: There is no abdominal tenderness.   Genitourinary:     Comments: Patient declined  Musculoskeletal:         General: No swelling.      Cervical back: Neck supple.   Skin:     General: Skin is warm and dry.      Capillary Refill: Capillary refill takes less than 2 seconds.   Neurological:      Mental Status: She is alert.   Psychiatric:         Mood and Affect: Mood normal.         Vital Signs  ED Triage Vitals   Temperature Pulse Respirations Blood Pressure SpO2   08/23/24 1037 08/23/24 1037 08/23/24 1037 08/23/24 1037 08/23/24 1037   97.7 °F (36.5 °C) 89 18 124/71 98 %      Temp Source Heart Rate Source Patient Position - Orthostatic VS BP Location FiO2 (%)   08/23/24 1037 08/23/24 1037 08/23/24 1037 08/23/24 1037 --   Oral Monitor Sitting Left arm       Pain Score       08/23/24 1252       No Pain           Vitals:    08/23/24 1037 08/23/24 1252   BP: 124/71 114/56   Pulse: 89 81   Patient Position - Orthostatic VS: Sitting Sitting         Visual Acuity      ED Medications  Medications   Rho(D) immune globulin (RHOGAM ULTRA-FILTERED PLUS) IM injection 300 mcg (300 mcg Intramuscular Given 8/23/24 1249)       Diagnostic Studies  Results Reviewed       Procedure Component Value Units Date/Time    hCG, quantitative [[id number]]  (Abnormal) Collected: 08/23/24 1106    Lab Status: Final result Specimen: Blood from Arm, Left Updated: 08/23/24 1207     HCG, Quant 72,618.0 mIU/mL     Narrative:       Expected Ranges:    HCG results between 5.0 and 25.0 mIU/mL may be indicative of early pregnancy but  should be interpreted in light of the total clinical presentation.    HCG can rise to detectable levels in joan and post menopausal women (0-11.6 mIU/mL).     Approximate               Approximate HCG  Gestation age          Concentration ( mIU/mL)  _____________          ______________________   Weeks                      HCG values  0.2-1                       5-50  1-2                           2-3                         100-5000  3-4                         500-86819  4-5                         1000-75601  5-6                         [id number]  6-8                         33045-990851  8-12                        [id number]      Basic metabolic panel [[id number]]  (Abnormal) Collected: 08/23/24 1106    Lab Status: Final result Specimen: Blood from Arm, Left Updated: 08/23/24 1134     Sodium 134 mmol/L      Potassium 3.9 mmol/L      Chloride 104 mmol/L      CO2 21 mmol/L      ANION GAP 9 mmol/L      BUN 10 mg/dL      Creatinine 0.84 mg/dL      Glucose 109 mg/dL      Calcium 9.4 mg/dL      eGFR 95 ml/min/1.73sq m     Narrative:      National Kidney Disease Foundation guidelines for Chronic Kidney Disease (CKD):     Stage 1 with normal or high GFR (GFR > 90 mL/min/1.73 square meters)    Stage 2 Mild CKD (GFR = 60-89 mL/min/1.73 square meters)    Stage 3A Moderate CKD (GFR = 45-59 mL/min/1.73 square meters)    Stage 3B Moderate CKD (GFR = 30-44 mL/min/1.73 square meters)    Stage 4 Severe CKD (GFR = 15-29 mL/min/1.73 square meters)    Stage 5 End Stage CKD (GFR <15 mL/min/1.73 square meters)  Note: GFR calculation is accurate only with a steady state creatinine    CBC and differential [[id number]]  (Abnormal) Collected: 08/23/24 1106    Lab Status: Final result Specimen: Blood from Arm, Left Updated: 08/23/24 1114     WBC 11.17 Thousand/uL      RBC 3.53 Million/uL      Hemoglobin 10.6 g/dL      Hematocrit 31.7 %      MCV 90 fL      MCH 30.0 pg      MCHC 33.4 g/dL      RDW 13.2 %      MPV 10.5 fL       Platelets 269 Thousands/uL      nRBC 0 /100 WBCs      Segmented % 76 %      Immature Grans % 0 %      Lymphocytes % 17 %      Monocytes % 5 %      Eosinophils Relative 2 %      Basophils Relative 0 %      Absolute Neutrophils 8.53 Thousands/µL      Absolute Immature Grans 0.04 Thousand/uL      Absolute Lymphocytes 1.90 Thousands/µL      Absolute Monocytes 0.50 Thousand/µL      Eosinophils Absolute 0.18 Thousand/µL      Basophils Absolute 0.02 Thousands/µL                    No orders to display              Procedures  Procedures         ED Course                   SBIRT 20yo+      Flowsheet Row Most Recent Value   Initial Alcohol Screen: US AUDIT-C     1. How often do you have a drink containing alcohol? 0 Filed at: 08/23/2024 1151   2. How many drinks containing alcohol do you have on a typical day you are drinking?  0 Filed at: 08/23/2024 1151   3b. FEMALE Any Age, or MALE 65+: How often do you have 4 or more drinks on one occassion? 0 Filed at: 08/23/2024 1151   Audit-C Score 0 Filed at: 08/23/2024 1151   EUNICE: How many times in the past year have you...    Used an illegal drug or used a prescription medication for non-medical reasons? Never Filed at: 08/23/2024 1151                      Medical Decision Making  28-year-old female presented to the emergency department requesting a RhoGAM shot.  Per chart review patient was seen by her OB/GYN and diagnosed with a miscarriage and recommended that she come to the emergency department to receive a RhoGAM shot.  On arrival patient was awake, alert, oriented and in no acute distress.  Initial vital signs were within normal limits.  Physical exam was unremarkable including benign abdominal examination.  Patient declined a pelvic exam.  Patient's blood type confirmed as a negative.  Patient provided with RhoGAM.  All diagnostic studies were discussed with the patient in detail.  Recommendation was made for the patient to follow-up for repeat hCG testing on Monday as  recommended by her OB/GYN and to follow-up for a repeat ultrasound with her OB/GYN as well.  Return precautions were discussed.    Patient agrees with the plan for discharge and feels comfortable to go home with proper f/u. Advised to return for worsening or additional problems. Diagnostic tests were reviewed and questions answered. Diagnosis, care plan and treatment options were discussed. The patient understands instructions and will follow up as directed.        Amount and/or Complexity of Data Reviewed  External Data Reviewed: labs and notes.     Details: Prior labs and notes were reviewed  Labs: ordered.    Risk  Prescription drug management.                 Disposition  Final diagnoses:   Miscarriage     Time reflects when diagnosis was documented in both MDM as applicable and the Disposition within this note       Time User Action Codes Description Comment    8/23/2024 12:39 PM Bentley Johnson Add [O03.9] Miscarriage           ED Disposition       ED Disposition   Discharge    Condition   Stable    Date/Time   Fri Aug 23, 2024 1239    Comment   Ana Suarez discharge to home/self care.                   Follow-up Information       Follow up With Specialties Details Why Contact Info Additional Information    Robert Edwards MD Obstetrics and Gynecology, Obstetrics, Gynecology Schedule an appointment as soon as possible for a visit   [address] [facility] [address]  [facility] [address]  [phone]       [facility] Emergency Department Emergency Medicine Go to  If symptoms worsen [address] [address]  [phone] [facility] Emergency Department, [address] [address]            Discharge Medication List as of 8/23/2024 12:56 PM        START taking these medications    Details   clotrimazole (GYNE-LOTRIMIN) 1 % vaginal cream Insert 1 applicator into the vagina daily at bedtime for 7 days, Starting Fri 8/23/2024, Until Fri 8/30/2024,  Normal           CONTINUE these medications which have NOT CHANGED    Details   metoclopramide (Reglan) 10 mg tablet Take 1 tablet (10 mg total) by mouth 3 (three) times a day, Starting Mon 2/20/2023, Normal      levonorgestrel-ethinyl estradiol (Larissia) 0.1-20 MG-MCG per tablet Take 1 tablet by mouth daily, Starting Wed 10/19/2022, Normal      nitrofurantoin (MACROBID) 100 mg capsule Take 100 mg by mouth 2 (two) times a day, Starting Tue 11/15/2022, Historical Med      norethindrone-ethinyl estradiol-ferrous fumarate (Junel Fe 24) 1-20 MG-MCG(24) per tablet Take 1 tablet by mouth daily, Starting Wed 4/24/2024, Normal      ondansetron (Zofran ODT) 4 mg disintegrating tablet Take 1 tablet (4 mg total) by mouth every 6 (six) hours as needed for nausea or vomiting, Starting Mon 1/3/2022, Normal      phenazopyridine (PYRIDIUM) 200 mg tablet Take 200 mg by mouth 3 (three) times a day, Starting Tue 11/15/2022, Historical Med             No discharge procedures on file.    PDMP Review       None            ED Provider  Electronically Signed by             Bentley Johnson MD  08/23/24 0379

## 2024-08-23 NOTE — TELEPHONE ENCOUNTER
----- Message from Robert Edwards MD sent at 8/23/2024 11:11 AM EDT -----  Patient has yeast infection medication called

## 2024-08-23 NOTE — TELEPHONE ENCOUNTER
Patient return phone call regarding lab results. I read verbatim 's message. Confirm medication sent to correct pharmacy. Patient had no additional questions.    Eileen Araujo MA  8/23/2024 12:57 PM EDT Back to Top      HIPAA blank left voice message to call back script for [place] sent    Robert Edwards MD  8/23/2024 11:11 AM EDT       Patient has yeast infection medication called

## 2024-08-26 ENCOUNTER — NURSE TRIAGE (OUTPATIENT)
Age: 29
End: 2024-08-26

## 2024-08-26 ENCOUNTER — APPOINTMENT (OUTPATIENT)
Dept: LAB | Facility: HOSPITAL | Age: 29
End: 2024-08-26
Attending: OBSTETRICS & GYNECOLOGY
Payer: COMMERCIAL

## 2024-08-26 DIAGNOSIS — O03.9 MISCARRIAGE: ICD-10-CM

## 2024-08-26 LAB — B-HCG SERPL-ACNC: ABNORMAL MIU/ML (ref 0–5)

## 2024-08-26 PROCEDURE — 84702 CHORIONIC GONADOTROPIN TEST: CPT

## 2024-08-26 PROCEDURE — 36415 COLL VENOUS BLD VENIPUNCTURE: CPT

## 2024-08-26 NOTE — TELEPHONE ENCOUNTER
Recent Miscarriage will get her HCG blood work done today, patient states she go now. Patient wants provider to know she still has some nausea. Did not  script for Lotrimin vaginal cream yet.

## 2024-08-26 NOTE — TELEPHONE ENCOUNTER
hCG level is going down confirm  her history of miscarriage recommend repeat hCG next week and to keep her next appointment as scheduled

## 2024-08-27 ENCOUNTER — PATIENT MESSAGE (OUTPATIENT)
Dept: OBGYN CLINIC | Facility: CLINIC | Age: 29
End: 2024-08-27

## 2024-08-28 DIAGNOSIS — O02.1 MISSED ABORTION: Primary | ICD-10-CM

## 2024-08-28 NOTE — PATIENT COMMUNICATION
Patient calling in - requesting appointment with provider to discuss decreasing beta hCG levels. Patient wondering if it would be possible to take medication for products to evacuate sooner. Patient states she is not currently bleeding, but she is vomiting occasionally and fatigued.   RN scheduled appointment as requested and informed patient provider will discuss best plan of care moving forward. Patient verbalized understanding.

## 2024-09-04 ENCOUNTER — APPOINTMENT (OUTPATIENT)
Dept: LAB | Facility: HOSPITAL | Age: 29
End: 2024-09-04
Attending: OBSTETRICS & GYNECOLOGY
Payer: COMMERCIAL

## 2024-09-04 ENCOUNTER — TELEPHONE (OUTPATIENT)
Age: 29
End: 2024-09-04

## 2024-09-04 DIAGNOSIS — O02.1 MISSED ABORTION: ICD-10-CM

## 2024-09-04 LAB — B-HCG SERPL-ACNC: 7265.6 MIU/ML (ref 0–5)

## 2024-09-04 PROCEDURE — 84702 CHORIONIC GONADOTROPIN TEST: CPT

## 2024-09-04 PROCEDURE — 36415 COLL VENOUS BLD VENIPUNCTURE: CPT

## 2024-09-04 NOTE — TELEPHONE ENCOUNTER
Robert Edwards MD  P Gynecology Pod Clinical  hCG level is decreasing appropriately to have repeat level next week  If patient still have question or concern please give her appointment to be seen otherwise I would like to see her in 2 weeks for follow-up

## 2024-09-04 NOTE — TELEPHONE ENCOUNTER
Outgoing call to patient. No answer. Left message for call back to discuss result note below.     Attempt 1/3.

## 2024-09-05 ENCOUNTER — OFFICE VISIT (OUTPATIENT)
Dept: OBGYN CLINIC | Facility: CLINIC | Age: 29
End: 2024-09-05
Payer: COMMERCIAL

## 2024-09-05 VITALS
WEIGHT: 157 LBS | HEIGHT: 61 IN | BODY MASS INDEX: 29.64 KG/M2 | DIASTOLIC BLOOD PRESSURE: 72 MMHG | SYSTOLIC BLOOD PRESSURE: 102 MMHG

## 2024-09-05 DIAGNOSIS — O03.4 INCOMPLETE ABORTION: Primary | ICD-10-CM

## 2024-09-05 PROCEDURE — 99213 OFFICE O/P EST LOW 20 MIN: CPT | Performed by: OBSTETRICS & GYNECOLOGY

## 2024-09-05 RX ORDER — MISOPROSTOL 200 UG/1
TABLET ORAL
Qty: 3 TABLET | Refills: 0 | Status: SHIPPED | OUTPATIENT
Start: 2024-09-05

## 2024-09-05 NOTE — TELEPHONE ENCOUNTER
Patient called to reschedule appointment for follow up. Spoke with , Griselle, okay for patient to arrive at 2:30 9/5 to be seen by provider.

## 2024-09-05 NOTE — PROGRESS NOTES
"Assessment/Plan:     Diagnoses and all orders for this visit:    Incomplete   -     miSOPROStol (Cytotec) 200 mcg tablet; Insert vaginally  -     hCG, quantitative; Future      28-year-old female  -0-3-1  Prior 2 spontaneous AB, 1 vtop   Prior  nonreassuring fetal heart rate  Recent miscarriage/incomplete AB  Blood type A neg had RhoGAM  Plan  To repeat quantitative hCG next week  Cytotec given secondary to patient noted within the endometrium cavity  Return to office next week for follow-up    Subjective:      Patient ID: Ana Suarez is a 28 y.o. female.    HPI  Patient seen evaluated present to the office today follow-up on spontaneous AB  Patient denies any complaint  Denies any vaginal bleeding  hCG results reviewed and discussed with patient  Patient did not have any bleeding since her miscarriage  Only brown spotting      The following portions of the patient's history were reviewed and updated as appropriate: allergies, current medications, past family history, past medical history, past social history, past surgical history and problem list.    Review of Systems      Objective:      /72 (BP Location: Left arm, Patient Position: Sitting, Cuff Size: Adult)   Ht 5' 1\" (1.549 m)   Wt 71.2 kg (157 lb)   LMP  (LMP Unknown)   BMI 29.66 kg/m²          Physical Exam  Constitutional:       Appearance: She is well-developed.   Abdominal:      General: There is no distension.      Palpations: Abdomen is soft.      Tenderness: There is no abdominal tenderness.   Genitourinary:     Labia:         Right: No rash, tenderness or lesion.         Left: No rash, tenderness or lesion.       Vagina: No signs of injury. No vaginal discharge, erythema or tenderness.      Cervix: No cervical motion tenderness, discharge or friability.      Adnexa:         Right: No mass, tenderness or fullness.          Left: No mass, tenderness or fullness.        Comments: Bedside ultrasound performed enlarged " uterus with endometrial thickness 3.5 cm tissue noted to be within the endometrium cavity management option reviewed and discussed with patient recommend Cytotec medication  Discussed with patient was risk-benefit side effect  Neurological:      Mental Status: She is alert and oriented to person, place, and time.   Psychiatric:         Behavior: Behavior normal.

## 2024-09-09 ENCOUNTER — TELEPHONE (OUTPATIENT)
Dept: OBGYN | Age: 29
End: 2024-09-09

## 2024-09-12 ENCOUNTER — NURSE TRIAGE (OUTPATIENT)
Age: 29
End: 2024-09-12

## 2024-09-12 NOTE — TELEPHONE ENCOUNTER
"Incoming call from patient. States that she was diagnosed with miscarriage and prescribed Cytotec. Patient took Cytotec on 9/6 and states that since then her bleeding has been heavy. Has been soaking through pad ever 1-2 hours and passes clots the size of a golf ball. Patient is also having abdominal pain that she rate 7/10. Denies feeling lightheaded, dizzy. Advised evaluation in ED. Patient will proceed to ED for evaluation.     Reason for Disposition   MODERATE vaginal bleeding (e.g., soaking pad or tampon per hour and present > 6 hours; 1 menstrual cup every 6 hours)    Answer Assessment - Initial Assessment Questions  1. AMOUNT: \"Describe the bleeding that you are having.\"     - SPOTTING: spotting, or pinkish / brownish mucous discharge; does not fill panti-liner or pad     - MILD:  less than 1 pad / hour; less than patient's usual menstrual bleeding    - MODERATE: 1-2 pads / hour; 1 menstrual cup every 6 hours; small-medium blood clots (e.g., pea, grape, small coin)    - SEVERE: soaking 2 or more pads/hour for 2 or more hours; 1 menstrual cup every 2 hours; bleeding not contained by pads or continuous red blood from vagina; large blood clots (e.g., golf ball, large coin)       Moderate     2. ONSET: \"When did the bleeding begin?\" \"Is it continuing now?\"      9/6    3. MENSTRUAL PERIOD: \"When was the last normal menstrual period?\" \"How is this different than your period?\"      Heavier     5. ABDOMINAL PAIN: \"Do you have any pain?\" \"How bad is the pain?\"  (e.g., Scale 1-10; mild, moderate, or severe)    - MILD (1-3): doesn't interfere with normal activities, abdomen soft and not tender to touch     - MODERATE (4-7): interferes with normal activities or awakens from sleep, tender to touch     - SEVERE (8-10): excruciating pain, doubled over, unable to do any normal activities       7/10    6. PREGNANCY: \"Could you be pregnant?\" \"Are you sexually active?\" \"Did you recently give birth?\"      Patient had " "miscarriage. Took cytotec 9/6     11. HEMODYNAMIC STATUS: \"Are you weak or feeling lightheaded?\" If Yes, ask: \"Can you stand and walk normally?\"         Denies    12. OTHER SYMPTOMS: \"What other symptoms are you having with the bleeding?\" (e.g., passed tissue, vaginal discharge, fever, menstrual-type cramps)        Passing blood clots size of golf ball    Protocols used: Vaginal Bleeding - Abnormal-ADULT-[place]    "

## 2024-09-14 ENCOUNTER — DOCUMENTATION (OUTPATIENT)
Dept: OBGYN CLINIC | Facility: CLINIC | Age: 29
End: 2024-09-14
Payer: COMMERCIAL

## 2024-09-14 ENCOUNTER — APPOINTMENT (EMERGENCY)
Dept: RADIOLOGY | Facility: HOSPITAL | Age: 29
End: 2024-09-14
Payer: COMMERCIAL

## 2024-09-14 ENCOUNTER — HOSPITAL ENCOUNTER (EMERGENCY)
Facility: HOSPITAL | Age: 29
Discharge: HOME | End: 2024-09-14
Attending: EMERGENCY MEDICINE
Payer: COMMERCIAL

## 2024-09-14 VITALS
RESPIRATION RATE: 18 BRPM | OXYGEN SATURATION: 99 % | DIASTOLIC BLOOD PRESSURE: 64 MMHG | TEMPERATURE: 98.5 F | BODY MASS INDEX: 28.34 KG/M2 | HEART RATE: 100 BPM | WEIGHT: 150 LBS | SYSTOLIC BLOOD PRESSURE: 106 MMHG

## 2024-09-14 DIAGNOSIS — O03.4 RETAINED PRODUCTS OF CONCEPTION AFTER MISCARRIAGE: Primary | ICD-10-CM

## 2024-09-14 DIAGNOSIS — O03.4 INCOMPLETE ABORTION: Primary | ICD-10-CM

## 2024-09-14 LAB
ABO GROUP BLD: NORMAL
ALBUMIN SERPL BCG-MCNC: 4.1 G/DL (ref 3.5–5)
ALP SERPL-CCNC: 44 U/L (ref 34–104)
ALT SERPL W P-5'-P-CCNC: 11 U/L (ref 7–52)
ANION GAP SERPL CALCULATED.3IONS-SCNC: 11 MMOL/L (ref 4–13)
AST SERPL W P-5'-P-CCNC: 13 U/L (ref 13–39)
ATRIAL RATE: 87 BPM
B-HCG SERPL-ACNC: 591.4 MIU/ML (ref 0–5)
BASOPHILS # BLD AUTO: 0.03 THOUSANDS/ΜL (ref 0–0.1)
BASOPHILS NFR BLD AUTO: 0 % (ref 0–1)
BILIRUB SERPL-MCNC: 0.33 MG/DL (ref 0.2–1)
BLD GP AB SCN SERPL QL: POSITIVE
BLOOD GROUP ANTIBODIES SERPL: NORMAL
BUN SERPL-MCNC: 10 MG/DL (ref 5–25)
CALCIUM SERPL-MCNC: 9.1 MG/DL (ref 8.4–10.2)
CHLORIDE SERPL-SCNC: 107 MMOL/L (ref 96–108)
CO2 SERPL-SCNC: 20 MMOL/L (ref 21–32)
CREAT SERPL-MCNC: 0.64 MG/DL (ref 0.6–1.3)
EOSINOPHIL # BLD AUTO: 0.11 THOUSAND/ΜL (ref 0–0.61)
EOSINOPHIL NFR BLD AUTO: 1 % (ref 0–6)
ERYTHROCYTE [DISTWIDTH] IN BLOOD BY AUTOMATED COUNT: 12.5 % (ref 11.6–15.1)
GFR SERPL CREATININE-BSD FRML MDRD: 122 ML/MIN/1.73SQ M
GLUCOSE SERPL-MCNC: 91 MG/DL (ref 65–140)
HCT VFR BLD AUTO: 29.1 % (ref 34.8–46.1)
HGB BLD-MCNC: 9.6 G/DL (ref 11.5–15.4)
IMM GRANULOCYTES # BLD AUTO: 0.05 THOUSAND/UL (ref 0–0.2)
IMM GRANULOCYTES NFR BLD AUTO: 0 % (ref 0–2)
LYMPHOCYTES # BLD AUTO: 2.25 THOUSANDS/ΜL (ref 0.6–4.47)
LYMPHOCYTES NFR BLD AUTO: 19 % (ref 14–44)
MCH RBC QN AUTO: 29.4 PG (ref 26.8–34.3)
MCHC RBC AUTO-ENTMCNC: 33 G/DL (ref 31.4–37.4)
MCV RBC AUTO: 89 FL (ref 82–98)
MONOCYTES # BLD AUTO: 0.45 THOUSAND/ΜL (ref 0.17–1.22)
MONOCYTES NFR BLD AUTO: 4 % (ref 4–12)
NEUTROPHILS # BLD AUTO: 9.21 THOUSANDS/ΜL (ref 1.85–7.62)
NEUTS SEG NFR BLD AUTO: 76 % (ref 43–75)
NRBC BLD AUTO-RTO: 0 /100 WBCS
P AXIS: 70 DEGREES
PLATELET # BLD AUTO: 360 THOUSANDS/UL (ref 149–390)
PMV BLD AUTO: 10.4 FL (ref 8.9–12.7)
POTASSIUM SERPL-SCNC: 4 MMOL/L (ref 3.5–5.3)
PR INTERVAL: 152 MS
PROT SERPL-MCNC: 7.4 G/DL (ref 6.4–8.4)
QRS AXIS: 53 DEGREES
QRSD INTERVAL: 68 MS
QT INTERVAL: 342 MS
QTC INTERVAL: 411 MS
RBC # BLD AUTO: 3.26 MILLION/UL (ref 3.81–5.12)
RH BLD: NEGATIVE
SODIUM SERPL-SCNC: 138 MMOL/L (ref 135–147)
SPECIMEN EXPIRATION DATE: NORMAL
T WAVE AXIS: 13 DEGREES
VENTRICULAR RATE: 87 BPM
WBC # BLD AUTO: 12.1 THOUSAND/UL (ref 4.31–10.16)

## 2024-09-14 PROCEDURE — 76830 TRANSVAGINAL US NON-OB: CPT

## 2024-09-14 PROCEDURE — 59812 TREATMENT OF MISCARRIAGE: CPT | Performed by: OBSTETRICS & GYNECOLOGY

## 2024-09-14 PROCEDURE — 99285 EMERGENCY DEPT VISIT HI MDM: CPT | Performed by: EMERGENCY MEDICINE

## 2024-09-14 PROCEDURE — 84702 CHORIONIC GONADOTROPIN TEST: CPT

## 2024-09-14 PROCEDURE — 86900 BLOOD TYPING SEROLOGIC ABO: CPT

## 2024-09-14 PROCEDURE — 85025 COMPLETE CBC W/AUTO DIFF WBC: CPT

## 2024-09-14 PROCEDURE — 86870 RBC ANTIBODY IDENTIFICATION: CPT | Performed by: EMERGENCY MEDICINE

## 2024-09-14 PROCEDURE — 99244 OFF/OP CNSLTJ NEW/EST MOD 40: CPT | Performed by: OBSTETRICS & GYNECOLOGY

## 2024-09-14 PROCEDURE — 93005 ELECTROCARDIOGRAM TRACING: CPT

## 2024-09-14 PROCEDURE — 86850 RBC ANTIBODY SCREEN: CPT

## 2024-09-14 PROCEDURE — 93010 ELECTROCARDIOGRAM REPORT: CPT | Performed by: STUDENT IN AN ORGANIZED HEALTH CARE EDUCATION/TRAINING PROGRAM

## 2024-09-14 PROCEDURE — 88305 TISSUE EXAM BY PATHOLOGIST: CPT | Performed by: STUDENT IN AN ORGANIZED HEALTH CARE EDUCATION/TRAINING PROGRAM

## 2024-09-14 PROCEDURE — 76856 US EXAM PELVIC COMPLETE: CPT

## 2024-09-14 PROCEDURE — 36415 COLL VENOUS BLD VENIPUNCTURE: CPT

## 2024-09-14 PROCEDURE — 96375 TX/PRO/DX INJ NEW DRUG ADDON: CPT

## 2024-09-14 PROCEDURE — 96374 THER/PROPH/DIAG INJ IV PUSH: CPT

## 2024-09-14 PROCEDURE — 99284 EMERGENCY DEPT VISIT MOD MDM: CPT

## 2024-09-14 PROCEDURE — 86901 BLOOD TYPING SEROLOGIC RH(D): CPT

## 2024-09-14 PROCEDURE — 80053 COMPREHEN METABOLIC PANEL: CPT

## 2024-09-14 RX ORDER — KETOROLAC TROMETHAMINE 30 MG/ML
30 INJECTION, SOLUTION INTRAMUSCULAR; INTRAVENOUS ONCE
Status: COMPLETED | OUTPATIENT
Start: 2024-09-14 | End: 2024-09-14

## 2024-09-14 RX ORDER — LIDOCAINE HCL/EPINEPHRINE/PF 2%-1:200K
10 VIAL (ML) INJECTION ONCE
Status: COMPLETED | OUTPATIENT
Start: 2024-09-14 | End: 2024-09-14

## 2024-09-14 RX ORDER — ONDANSETRON 2 MG/ML
4 INJECTION INTRAMUSCULAR; INTRAVENOUS ONCE
Status: COMPLETED | OUTPATIENT
Start: 2024-09-14 | End: 2024-09-14

## 2024-09-14 RX ADMIN — ONDANSETRON 4 MG: 2 INJECTION INTRAMUSCULAR; INTRAVENOUS at 17:31

## 2024-09-14 RX ADMIN — LIDOCAINE HYDROCHLORIDE,EPINEPHRINE BITARTRATE 10 ML: 20; .005 INJECTION, SOLUTION EPIDURAL; INFILTRATION; INTRACAUDAL; PERINEURAL at 16:49

## 2024-09-14 RX ADMIN — KETOROLAC TROMETHAMINE 30 MG: 30 INJECTION, SOLUTION INTRAMUSCULAR; INTRAVENOUS at 16:49
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
## 2024-09-14 NOTE — PROCEDURES
The patient was consented for manual vacuum aspiration of the uterus for management of early pregnancy loss. She understands the risks including pain, bleeding, infection, incomplete procedure with need for repeat aspiration, uterine perforation, uterine scarring, need for blood transfusion in the event of hemorrhage, need for hospital transfer in case of emergency. She accepts these risks and wishes to proceed.     After consent was obtained, the patient was given 30mg toradol IV for pre-procedure pain and anxiety control.  The patient was placed in dorsal lithotomy position. A speculum was inserted into the vagina. The cervix was visualized and prepped with betadine. Approximately 200cc of clot was cleared from the posterior fornix.  A tenaculum was applied to the anterior lip of the cervix. A paracervical block was performed using approximately 10cc of 2% lidocaine with epinephrine. The cervix was noted to be dilated to >1cm with products of conception visible at the external os.  An attempt was made to grasp those with ringed forceps but the tissue fragmented and could not be removed. A 8 mm cannula was selected and advanced to the uterine fundus. The aspirator was prepared in the usual fashion and connected to the cannula. The vacuum was activated and products of conception were collected in the aspirator. With a single pass, the tissue in the cervix was removed with no additional tissue noted beyond that tissue. After the tissue was removed from the cervix, immediate hemostasis was noted from the cervical os.     The aspirator and cannula were removed from the uterus. The tenaculum was removed from the cervix. The cervix was noted to be hemostatic. All instruments were removed from the vagina. The patient tolerated the procedure well with no complications.     The products of conception were examined and sent to pathology for evaluation.    The ED team was notified of the findings and the disposition.

## 2024-09-14 NOTE — ED ATTENDING ATTESTATION
2024  I, Toni Adams MD, saw and evaluated the patient. I have discussed the patient with the resident/non-physician practitioner and agree with the resident's/non-physician practitioner's findings, Plan of Care, and MDM as documented in the resident's/non-physician practitioner's note, except where noted. All available labs and Radiology studies were reviewed.  I was present for key portions of any procedure(s) performed by the resident/non-physician practitioner and I was immediately available to provide assistance.       At this point I agree with the current assessment done in the Emergency Department.  I have conducted an independent evaluation of this patient a history and physical is as follows:    ED Course  ED Course as of 24 1255   Sat Sep 14, 2024   1226 Per resident h&p 27 YO F presents for recurrent vaginal bleeding; had incomplete miscarriage last month; administered misoprostol to encourage evacuation and had bleeding at that time; bleeding is much heavier this am; some low back pain yesterday; ; A- blood type has received Rhogam in the past O: F in no distress; + vaginal bleeding during the exam I/P recurrent vaginal bleeding CBC BMP T&S; serum quantitative hCG; US pelvis     Emergency Department Note- Ana Suarez 28 y.o. female MRN: [id number]    Unit/Bed#: ED 15 Encounter: [id number]    Ana Suarez is a 28 y.o. female who presents with   Chief Complaint   Patient presents with    Vaginal Bleeding     Pt miscarriage one month ago took  pills. Pt says the bleeding was okay for a bit, 2 days prior was told to go to ED came today cause of dizziness and increased bleeding.    Dizziness         History of Present Illness   HPI:  Ana Suarez is a 28 y.o. female who presents for evaluation of:  Recurrent vaginal bleeding that is heavier than normal.  Patient noted the onset of heavy vaginal bleeding this morning.  Patient notes that she had a miscarriage about a  month ago and was administered misoprostol to promote the evacuation of her uterus.  Patient is a  4 para 1-0-3-1; she is A- blood type and received RhoGAM during her prior vaginal bleeding episodes.  Patient notes that she also notes a little bit of dizziness today especially when she sees the vaginal bleeding which makes her feel anxious.  She denies chest pain, dyspnea, back pain, abdominal pain, flank pain, and dysuria.    Review of Systems   Constitutional:  Negative for fatigue and fever.   HENT:  Negative for congestion and sore throat.    Respiratory:  Negative for cough and shortness of breath.    Cardiovascular:  Negative for chest pain and palpitations.   Gastrointestinal:  Negative for abdominal pain and nausea.   Genitourinary:  Positive for vaginal bleeding. Negative for flank pain and frequency.   Neurological:  Positive for dizziness and light-headedness. Negative for headaches.   Psychiatric/Behavioral:  Negative for dysphoric mood and hallucinations.    All other systems reviewed and are negative.      Historical Information   Past Medical History:   Diagnosis Date         Miscarriage 2017    miscarriage x1    Urinary tract infection     Hx UTI x1 in the past    Varicella     recv'd vaccine     Past Surgical History:   Procedure Laterality Date     SECTION      ELBOW SURGERY Right     ELBOW SURGERY Right     from elbow and arm fracture    FRACTURE SURGERY      INDUCED        Social History   Social History     Substance and Sexual Activity   Alcohol Use Yes    Comment: occasioally     Social History     Substance and Sexual Activity   Drug Use No     Social History     Tobacco Use   Smoking Status Former   Smokeless Tobacco Never     Family History:   Family History   Problem Relation Age of Onset    Cancer Mother     No Known Problems Father     No Known Problems Sister     No Known Problems Brother     Diabetes Paternal Grandfather     Breast cancer Family         Meds/Allergies   PTA meds:   Prior to Admission Medications   Prescriptions Last Dose Informant Patient Reported? Taking?   levonorgestrel-ethinyl estradiol (Larissia) 0.1-20 MG-MCG per tablet   No No   Sig: Take 1 tablet by mouth daily   metoclopramide (Reglan) 10 mg tablet   No No   Sig: Take 1 tablet (10 mg total) by mouth 3 (three) times a day   miSOPROStol (Cytotec) 200 mcg tablet   No No   Sig: Insert vaginally   nitrofurantoin (MACROBID) 100 mg capsule   Yes No   Sig: Take 100 mg by mouth 2 (two) times a day   norethindrone-ethinyl estradiol-ferrous fumarate (Junel Fe 24) 1-20 MG-MCG(24) per tablet   No No   Sig: Take 1 tablet by mouth daily   ondansetron (Zofran ODT) 4 mg disintegrating tablet   No No   Sig: Take 1 tablet (4 mg total) by mouth every 6 (six) hours as needed for nausea or vomiting   phenazopyridine (PYRIDIUM) 200 mg tablet   Yes No   Sig: Take 200 mg by mouth 3 (three) times a day      Facility-Administered Medications: None     No Known Allergies    Objective   First Vitals:   Blood Pressure: 119/76 (09/14/24 1208)  Pulse: 93 (09/14/24 1208)  Temperature: 98.5 °F (36.9 °C) (09/14/24 1215)  Temp Source: Oral (09/14/24 1215)  Respirations: 18 (09/14/24 1208)  Weight - Scale: 68 kg (150 lb) (09/14/24 1208)  SpO2: 97 % (09/14/24 1208)    Current Vitals:   Blood Pressure: 119/76 (09/14/24 1208)  Pulse: 93 (09/14/24 1208)  Temperature: 98.5 °F (36.9 °C) (09/14/24 1215)  Temp Source: Oral (09/14/24 1215)  Respirations: 18 (09/14/24 1208)  Weight - Scale: 68 kg (150 lb) (09/14/24 1208)  SpO2: 97 % (09/14/24 1208)    No intake or output data in the 24 hours ending 09/14/24 1255    Invasive Devices       Peripheral Intravenous Line  Duration             Peripheral IV 09/14/24 Left;Ventral (anterior) Forearm <1 day                    Physical Exam  Vitals and nursing note reviewed.   Constitutional:       General: She is not in acute distress.     Appearance: Normal appearance. She is  "well-developed.   HENT:      Head: Normocephalic and atraumatic.      Right Ear: External ear normal.      Left Ear: External ear normal.      Nose: Nose normal.      Mouth/Throat:      Pharynx: No oropharyngeal exudate.   Eyes:      Conjunctiva/sclera: Conjunctivae normal.      Pupils: Pupils are equal, round, and reactive to light.   Cardiovascular:      Rate and Rhythm: Normal rate and regular rhythm.   Pulmonary:      Effort: Pulmonary effort is normal. No respiratory distress.   Abdominal:      General: Abdomen is flat. There is no distension.      Palpations: Abdomen is soft.   Musculoskeletal:         General: No deformity. Normal range of motion.      Cervical back: Normal range of motion and neck supple.   Skin:     General: Skin is warm and dry.      Capillary Refill: Capillary refill takes less than 2 seconds.   Neurological:      General: No focal deficit present.      Mental Status: She is alert and oriented to person, place, and time. Mental status is at baseline.      Coordination: Coordination normal.   Psychiatric:         Mood and Affect: Mood normal.         Behavior: Behavior normal.         Thought Content: Thought content normal.         Judgment: Judgment normal.           Medical Decision Makin.  Recurrent vaginal bleeding in a patient that was recently pregnant: Plan to check hCG to rule out retained products of conception; ultrasound to rule out retained proximal conception; CBC to rule out anemia and leukocytosis; basic metabolic profile rule out lecture light disturbance, uremia, and disorders of glucose homeostasis.  Type and screen to evaluate for possible need for anti-D antibody therapy.    No results found for this or any previous visit (from the past 36 hour(s)).  US pelvis complete w transvaginal    (Results Pending)         Portions of the record may have been created with voice recognition software. Occasional wrong word or \"sound a like\" substitutions may have occurred due " to the inherent limitations of voice recognition software.  Read the chart carefully and recognize, using context, where substitutions have occurred.        Critical Care Time  Procedures

## 2024-09-14 NOTE — DISCHARGE INSTRUCTIONS
You were seen in the emergency department for vaginal bleeding after a miscarriage.  Your workup was consistent with retained products of conception causing worsening bleeding.  The retained tissue was removed at bedside by OB.  Take ibuprofen for your pain.  Schedule a follow-up appointment with your OB.  If you have worsening bleeding or any new concerning symptoms please return to the emergency department.

## 2024-09-16 ENCOUNTER — OFFICE VISIT (OUTPATIENT)
Dept: OBGYN CLINIC | Facility: CLINIC | Age: 29
End: 2024-09-16
Payer: COMMERCIAL

## 2024-09-16 ENCOUNTER — NURSE TRIAGE (OUTPATIENT)
Dept: OTHER | Facility: OTHER | Age: 29
End: 2024-09-16

## 2024-09-16 VITALS
HEIGHT: 61 IN | BODY MASS INDEX: 29.64 KG/M2 | DIASTOLIC BLOOD PRESSURE: 60 MMHG | SYSTOLIC BLOOD PRESSURE: 122 MMHG | WEIGHT: 157 LBS

## 2024-09-16 DIAGNOSIS — O02.1 MISSED ABORTION: Primary | ICD-10-CM

## 2024-09-16 DIAGNOSIS — Z30.013 ENCOUNTER FOR INITIAL PRESCRIPTION OF INJECTABLE CONTRACEPTIVE: ICD-10-CM

## 2024-09-16 PROCEDURE — 99213 OFFICE O/P EST LOW 20 MIN: CPT | Performed by: OBSTETRICS & GYNECOLOGY

## 2024-09-16 RX ORDER — DOXYCYCLINE 100 MG/1
100 TABLET ORAL 2 TIMES DAILY
Qty: 14 TABLET | Refills: 0 | Status: SHIPPED | OUTPATIENT
Start: 2024-09-16 | End: 2024-09-23

## 2024-09-16 RX ORDER — MEDROXYPROGESTERONE ACETATE 150 MG/ML
150 INJECTION, SUSPENSION INTRAMUSCULAR
Qty: 1 ML | Refills: 1 | Status: SHIPPED | OUTPATIENT
Start: 2024-09-16
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
## 2024-09-16 NOTE — TELEPHONE ENCOUNTER
Can report please make sure the pod  notify provider when they are sending patient to the ED so provider would be aware/prepare/expect that patient is going to the ED I was not notified on this patient and this is very important

## 2024-09-16 NOTE — TELEPHONE ENCOUNTER
"Reason for Disposition  • SEVERE abdominal pain (e.g., excruciating)    Answer Assessment - Initial Assessment Questions  1. SYMPTOM: \"What is your main concern right now?\" \"What questions do you have?\"      Just passed a very large clot  - bigger than a ping pong ball    2. ONSET: \"When did it  start?\"      Just now     3. DATE of : \"When did you have the  procedure?\" \"Do you know how many weeks or months pregnant you were?\"      2 days ago    4. ABDOMEN PAIN: \"Do you have any abdomen pain?\" If present, ask: \"How bad is it?\" (e.g., Scale 1- 10; mild, moderate, or severe)      Sharp L side abdominal pain, also felt in pelvis and lower back    5. VAGINAL BLEEDING: \"Describe any bleeding that you are having.\" \"How much bleeding is there?\"      Bleeding is not too bad aside from the clot    6. FEVER: \"Do you have a fever?\" If Yes, ask: \"What is your temperature, how was it measured, and when did it start?\"      No    7. OTHER SYMPTOMS: \"Are there any other symptoms?\" (e.g., breast symptoms, depression, dizziness)      Denies dizziness  Been feeling weak all day    Protocols used:  - Surgical Procedure Follow-up Call-Adult-    "
"Regarding: clotting  ----- Message from Alison ANTUNEZ sent at 9/16/2024  3:52 AM EDT -----  \"I was put on a DNC and I just passed a large clot is this normal? I have a sharp pain in my back as well. \"    "
I spoke to the patient she s coming to her appointment as schedule  
Yes she s coming today   
Yes

## 2024-09-19 PROCEDURE — 88305 TISSUE EXAM BY PATHOLOGIST: CPT | Performed by: STUDENT IN AN ORGANIZED HEALTH CARE EDUCATION/TRAINING PROGRAM

## 2024-09-20 NOTE — TELEPHONE ENCOUNTER
Pt was prescribed  2 pills for yeast infection, she took one, lost the other, wants 1 more prescribed  The patient is a 36y Female complaining of pain, jaw.

## 2024-09-27 ENCOUNTER — TELEPHONE (OUTPATIENT)
Dept: OBGYN CLINIC | Facility: CLINIC | Age: 29
End: 2024-09-27

## 2024-09-27 DIAGNOSIS — N76.0 ACUTE VAGINITIS: Primary | ICD-10-CM

## 2024-09-27 RX ORDER — METRONIDAZOLE 500 MG/1
500 TABLET ORAL EVERY 12 HOURS SCHEDULED
Qty: 14 TABLET | Refills: 0 | Status: SHIPPED | OUTPATIENT
Start: 2024-09-27 | End: 2024-10-04

## 2024-09-27 NOTE — TELEPHONE ENCOUNTER
I tried to call patient to clarify symptoms and her recent visit with Dr. Edwards, no answer left detailed voicemail.  Looks like she was just seen for office visit on 9/16 and was prescribed doxycycline.  I question if possible yeast infection from using the doxycycline so I wanted to clarify symptoms.  Before I send over any medication for BV I did recommend possibly trying Monistat 7 first or would like to directly speak with patient to clarify before anything is sent to pharmacy.

## 2024-09-27 NOTE — TELEPHONE ENCOUNTER
Patient called the Rx refill line and was asking if a anything has been sent over to the pharmacy for her for BV- I was unable to find any messages- pt states she has been corresponding with the office today via [email or web address] - still unable to find anything to help pt - please contact pt back re this Rx

## 2024-10-01 DIAGNOSIS — N76.0 ACUTE VAGINITIS: ICD-10-CM

## 2024-10-01 RX ORDER — METRONIDAZOLE 500 MG/1
500 TABLET ORAL EVERY 12 HOURS SCHEDULED
Qty: 14 TABLET | Refills: 0 | Status: SHIPPED | OUTPATIENT
Start: 2024-10-01 | End: 2024-10-08

## 2024-12-09 ENCOUNTER — RESULTS FOLLOW-UP (OUTPATIENT)
Dept: OBGYN CLINIC | Facility: CLINIC | Age: 29
End: 2024-12-09

## 2024-12-09 ENCOUNTER — CLINICAL SUPPORT (OUTPATIENT)
Dept: OBGYN CLINIC | Facility: CLINIC | Age: 29
End: 2024-12-09
Payer: COMMERCIAL

## 2024-12-09 DIAGNOSIS — R39.9 UTI SYMPTOMS: Primary | ICD-10-CM

## 2024-12-09 LAB
SL AMB  POCT GLUCOSE, UA: NORMAL
SL AMB LEUKOCYTE ESTERASE,UA: NORMAL
SL AMB POCT BILIRUBIN,UA: NORMAL
SL AMB POCT BLOOD,UA: NORMAL
SL AMB POCT KETONES,UA: NORMAL
SL AMB POCT NITRITE,UA: NORMAL
SL AMB POCT PH,UA: NORMAL
SL AMB POCT SPECIFIC GRAVITY,UA: 1.01
SL AMB POCT URINE PROTEIN: NORMAL
SL AMB POCT UROBILINOGEN: NORMAL

## 2024-12-09 PROCEDURE — 87086 URINE CULTURE/COLONY COUNT: CPT | Performed by: PHYSICIAN ASSISTANT

## 2024-12-09 PROCEDURE — 81002 URINALYSIS NONAUTO W/O SCOPE: CPT

## 2024-12-11 LAB — BACTERIA UR CULT: NORMAL

## 2024-12-30 ENCOUNTER — NURSE TRIAGE (OUTPATIENT)
Age: 29
End: 2024-12-30

## 2024-12-30 NOTE — TELEPHONE ENCOUNTER
Regarding: UTI symptoms  ----- Message from Ana BABCOCK sent at 12/30/2024  2:21 PM EST -----  Established pt of [facility] called reporting UTI symptoms and itching with discharge. Pt was scheduled by CTS for an appt today (12/30/24) to be seen in office to address concerns. Pt missed appt. Attempted to R/S for next available tomorrow, 12/31/24. Pt declined. She is asking if something can be prescribed vs. Coming in for an appt. Communicated it would be at the providers discretion and that further testing/office visit may be required prior to prescribing a medication. Please advise. Thank you.

## 2024-12-30 NOTE — TELEPHONE ENCOUNTER
"Spoke with patient who reports she is having burning with urination occasionally. She also reports clumpy vaginal discharge and itching.  She states it started a few days ago.  Appt made for today.  No further questions or concerns at this time.    Reason for Disposition   All other females with painful urination, or patient wants to be seen    Answer Assessment - Initial Assessment Questions  1. SEVERITY: \"How bad is the pain?\"  (e.g., Scale 1-10; mild, moderate, or severe)      mild  2. FREQUENCY: \"How many times have you had painful urination today?\"       1x  3. PATTERN: \"Is pain present every time you urinate or just sometimes?\"       sometimes  4. ONSET: \"When did the painful urination start?\"       Few days ago  5. FEVER: \"Do you have a fever?\" If Yes, ask: \"What is your temperature, how was it measured, and when did it start?\"      denies  6. PAST UTI: \"Have you had a urine infection before?\" If Yes, ask: \"When was the last time?\" and \"What happened that time?\"       Yes antibiotics  7. CAUSE: \"What do you think is causing the painful urination?\"  (e.g., UTI, scratch, Herpes sore)      UTI  8. OTHER SYMPTOMS: \"Do you have any other symptoms?\" (e.g., blood in urine, flank pain, genital sores, urgency, vaginal discharge)      Itching and clumpy discharge.  9. PREGNANCY: \"Is there any chance you are pregnant?\" \"When was your last menstrual period?\"      12/17    Protocols used: Urination Pain - Female-Adult-[place]    "

## 2024-12-30 NOTE — TELEPHONE ENCOUNTER
Reason for Disposition  • Requesting lab results and adult stable (no new symptoms, not getting worse)     Requesting RX    Answer Assessment - Initial Assessment Questions  Ana previously triaged today and appointment provided due to not meeting protocol for phone in Rx/orders due to multiple complaints.    Missed today's appt, declined tomorrow.     Please advise if you would like to provide orders or RX, see previous complete triage details for symptoms    Protocols used: Information Only Call - No Triage-Adult-[place]

## 2024-12-31 ENCOUNTER — OFFICE VISIT (OUTPATIENT)
Dept: OBGYN CLINIC | Facility: CLINIC | Age: 29
End: 2024-12-31
Payer: COMMERCIAL

## 2024-12-31 VITALS
DIASTOLIC BLOOD PRESSURE: 72 MMHG | BODY MASS INDEX: 28.7 KG/M2 | WEIGHT: 152 LBS | SYSTOLIC BLOOD PRESSURE: 122 MMHG | HEIGHT: 61 IN

## 2024-12-31 DIAGNOSIS — N89.8 VAGINAL ITCHING: ICD-10-CM

## 2024-12-31 DIAGNOSIS — Z72.51 UNPROTECTED SEXUAL INTERCOURSE: ICD-10-CM

## 2024-12-31 DIAGNOSIS — R39.9 UTI SYMPTOMS: Primary | ICD-10-CM

## 2024-12-31 DIAGNOSIS — Z11.3 SCREENING FOR STDS (SEXUALLY TRANSMITTED DISEASES): ICD-10-CM

## 2024-12-31 DIAGNOSIS — N89.8 VAGINAL DISCHARGE: ICD-10-CM

## 2024-12-31 LAB
SL AMB  POCT GLUCOSE, UA: ABNORMAL
SL AMB LEUKOCYTE ESTERASE,UA: ABNORMAL
SL AMB POCT BILIRUBIN,UA: ABNORMAL
SL AMB POCT BLOOD,UA: ABNORMAL
SL AMB POCT CLARITY,UA: CLEAR
SL AMB POCT COLOR,UA: ABNORMAL
SL AMB POCT KETONES,UA: ABNORMAL
SL AMB POCT NITRITE,UA: ABNORMAL
SL AMB POCT PH,UA: 5
SL AMB POCT SPECIFIC GRAVITY,UA: 1.02
SL AMB POCT URINE HCG: NEGATIVE
SL AMB POCT URINE PROTEIN: ABNORMAL
SL AMB POCT UROBILINOGEN: 3.5

## 2024-12-31 PROCEDURE — 87510 GARDNER VAG DNA DIR PROBE: CPT | Performed by: PHYSICIAN ASSISTANT

## 2024-12-31 PROCEDURE — 87591 N.GONORRHOEAE DNA AMP PROB: CPT | Performed by: PHYSICIAN ASSISTANT

## 2024-12-31 PROCEDURE — 99213 OFFICE O/P EST LOW 20 MIN: CPT | Performed by: PHYSICIAN ASSISTANT

## 2024-12-31 PROCEDURE — 81002 URINALYSIS NONAUTO W/O SCOPE: CPT | Performed by: PHYSICIAN ASSISTANT

## 2024-12-31 PROCEDURE — 81025 URINE PREGNANCY TEST: CPT | Performed by: PHYSICIAN ASSISTANT

## 2024-12-31 PROCEDURE — 87491 CHLMYD TRACH DNA AMP PROBE: CPT | Performed by: PHYSICIAN ASSISTANT

## 2024-12-31 PROCEDURE — 87086 URINE CULTURE/COLONY COUNT: CPT | Performed by: PHYSICIAN ASSISTANT

## 2024-12-31 PROCEDURE — 87480 CANDIDA DNA DIR PROBE: CPT | Performed by: PHYSICIAN ASSISTANT

## 2024-12-31 PROCEDURE — 87660 TRICHOMONAS VAGIN DIR PROBE: CPT | Performed by: PHYSICIAN ASSISTANT

## 2024-12-31 NOTE — PROGRESS NOTES
Assessment/Plan:      Diagnoses and all orders for this visit:    UTI symptoms  -     POCT urine dip  -     POCT urine HCG  -     Urine culture    Unprotected sexual intercourse    Vaginal itching    Vaginal discharge  -     VAGINOSIS DNA PROBE    Screening for STDs (sexually transmitted diseases)  -     Chlamydia/GC amplified DNA by PCR     21-year-old female presenting today for UTI symptoms of urinary urgency and strong urine odor.  However she also notes vaginal irritation and itching as well as some clumpy discharge.  She does admit to having extra doxycycline at home and took 2 doses which helped the odor slightly, she also use Monistat suppository cream today.  She also desire pregnancy test as she has unprotected intercourse but had normal LMP 12/10.    Her urine dip today was unremarkable.  Will send out urine for culture just in case rule out UTI.  Urine hCG was negative however I did let her know that it is too early to test for pregnancy this cycle since her last LMP.  She is not using any protection but does not desire any hormonal contraception at this time.  Encouraged consistent condom use.    Vaginosis probe, GC/CT obtained today.  Since patient already self treated with Doxy and started Monistat will await test results to determine further treatment plan.  Patient agreeable.    Chief Complaint   Patient presents with    Urinary Tract Infection     Pt states that she thinks she has uti. Symptoms are frequent urination,odor and itching and burning from irritation.        Subjective:     Patient ID: Ana Suarez is a 29 y.o. female.    30y/o female here today for UTI sxs.  States she urinary urgency and strong urine odor, similar to what she had when she had UTI in the past.  She also has slight general vaginal irritation and itching.  Occasional clumpy discharge, no vaginal odor.    She states she had 2 doxy pills from a previous issue and took it last night.  States it helped odor.  Also used  monistat today    She denies new sexual partner.   Denies pelvic pain or abnormal bleeding.   She is having unprotected intercourse, desires pregnancy test. LMP 12/10/24  - normal              Review of Systems   Constitutional: Negative.  Negative for chills and fever.   Gastrointestinal:  Negative for abdominal pain.   Genitourinary:  Positive for urgency, vaginal discharge and vaginal pain (itching, irritation). Negative for difficulty urinating, dysuria, frequency, genital sores, hematuria, menstrual problem, pelvic pain and vaginal bleeding.         The following portions of the patient's history were reviewed and updated as appropriate: allergies, current medications, past family history, past medical history, past social history, past surgical history, and problem list.      Objective:     Physical Exam  Vitals reviewed.   Constitutional:       Appearance: Normal appearance. She is not ill-appearing.   Abdominal:      Tenderness: There is no abdominal tenderness.   Genitourinary:     General: Normal vulva.      Labia:         Right: No rash, tenderness or lesion.         Left: No rash, tenderness or lesion.       Vagina: Vaginal discharge (clear-white discharge from monistat mixed with clumps of white discharge.) present. No erythema, tenderness, bleeding or lesions.      Cervix: Normal. No cervical motion tenderness, discharge, friability, lesion, erythema or cervical bleeding.   Lymphadenopathy:      Lower Body: No right inguinal adenopathy. No left inguinal adenopathy.   Neurological:      Mental Status: She is alert and oriented to person, place, and time.   Psychiatric:         Mood and Affect: Mood normal.         Behavior: Behavior normal. Behavior is cooperative.

## 2025-01-01 LAB
BACTERIA UR CULT: NORMAL
CANDIDA RRNA VAG QL PROBE: DETECTED
G VAGINALIS RRNA GENITAL QL PROBE: DETECTED
T VAGINALIS RRNA GENITAL QL PROBE: NOT DETECTED

## 2025-01-02 ENCOUNTER — RESULTS FOLLOW-UP (OUTPATIENT)
Dept: OBGYN CLINIC | Facility: CLINIC | Age: 30
End: 2025-01-02

## 2025-01-02 DIAGNOSIS — B96.89 BV (BACTERIAL VAGINOSIS): Primary | ICD-10-CM

## 2025-01-02 DIAGNOSIS — N76.0 BV (BACTERIAL VAGINOSIS): Primary | ICD-10-CM

## 2025-01-02 LAB
C TRACH DNA SPEC QL NAA+PROBE: NEGATIVE
N GONORRHOEA DNA SPEC QL NAA+PROBE: NEGATIVE

## 2025-01-02 RX ORDER — METRONIDAZOLE 500 MG/1
500 TABLET ORAL EVERY 12 HOURS SCHEDULED
Qty: 14 TABLET | Refills: 0 | Status: SHIPPED | OUTPATIENT
Start: 2025-01-02 | End: 2025-01-09

## 2025-01-06 NOTE — TELEPHONE ENCOUNTER
Can you please call the patient and follow-up on her symptom if symptom persist we can call medication to the pharmacy

## 2025-01-14 ENCOUNTER — APPOINTMENT (OUTPATIENT)
Dept: LAB | Facility: HOSPITAL | Age: 30
End: 2025-01-14
Attending: FAMILY MEDICINE
Payer: COMMERCIAL

## 2025-01-14 ENCOUNTER — OFFICE VISIT (OUTPATIENT)
Dept: FAMILY MEDICINE CLINIC | Facility: CLINIC | Age: 30
End: 2025-01-14
Payer: COMMERCIAL

## 2025-01-14 ENCOUNTER — TELEPHONE (OUTPATIENT)
Age: 30
End: 2025-01-14

## 2025-01-14 VITALS
SYSTOLIC BLOOD PRESSURE: 130 MMHG | WEIGHT: 150 LBS | HEIGHT: 61 IN | OXYGEN SATURATION: 98 % | TEMPERATURE: 97.7 F | HEART RATE: 79 BPM | DIASTOLIC BLOOD PRESSURE: 76 MMHG | RESPIRATION RATE: 16 BRPM | BODY MASS INDEX: 28.32 KG/M2

## 2025-01-14 DIAGNOSIS — Z13.1 SCREENING FOR DIABETES MELLITUS: ICD-10-CM

## 2025-01-14 DIAGNOSIS — Z13.29 THYROID DISORDER SCREENING: ICD-10-CM

## 2025-01-14 DIAGNOSIS — Z00.00 ANNUAL PHYSICAL EXAM: ICD-10-CM

## 2025-01-14 DIAGNOSIS — Z11.59 NEED FOR HEPATITIS C SCREENING TEST: ICD-10-CM

## 2025-01-14 DIAGNOSIS — Z13.6 SCREENING FOR CARDIOVASCULAR CONDITION: ICD-10-CM

## 2025-01-14 DIAGNOSIS — G43.009 MIGRAINE WITHOUT AURA AND WITHOUT STATUS MIGRAINOSUS, NOT INTRACTABLE: ICD-10-CM

## 2025-01-14 DIAGNOSIS — Z00.01 ENCOUNTER FOR GENERAL ADULT MEDICAL EXAMINATION WITH ABNORMAL FINDINGS: Primary | ICD-10-CM

## 2025-01-14 LAB
ALBUMIN SERPL BCG-MCNC: 4.6 G/DL (ref 3.5–5)
ALP SERPL-CCNC: 38 U/L (ref 34–104)
ALT SERPL W P-5'-P-CCNC: 18 U/L (ref 7–52)
ANION GAP SERPL CALCULATED.3IONS-SCNC: 8 MMOL/L (ref 4–13)
AST SERPL W P-5'-P-CCNC: 18 U/L (ref 13–39)
BASOPHILS # BLD AUTO: 0.05 THOUSANDS/ΜL (ref 0–0.1)
BASOPHILS NFR BLD AUTO: 1 % (ref 0–1)
BILIRUB SERPL-MCNC: 0.37 MG/DL (ref 0.2–1)
BUN SERPL-MCNC: 11 MG/DL (ref 5–25)
CALCIUM SERPL-MCNC: 10 MG/DL (ref 8.4–10.2)
CHLORIDE SERPL-SCNC: 103 MMOL/L (ref 96–108)
CHOLEST SERPL-MCNC: 193 MG/DL (ref ?–200)
CO2 SERPL-SCNC: 27 MMOL/L (ref 21–32)
CREAT SERPL-MCNC: 0.81 MG/DL (ref 0.6–1.3)
EOSINOPHIL # BLD AUTO: 0.17 THOUSAND/ΜL (ref 0–0.61)
EOSINOPHIL NFR BLD AUTO: 2 % (ref 0–6)
ERYTHROCYTE [DISTWIDTH] IN BLOOD BY AUTOMATED COUNT: 19.7 % (ref 11.6–15.1)
EST. AVERAGE GLUCOSE BLD GHB EST-MCNC: 120 MG/DL
GFR SERPL CREATININE-BSD FRML MDRD: 99 ML/MIN/1.73SQ M
GLUCOSE P FAST SERPL-MCNC: 107 MG/DL (ref 65–99)
HBA1C MFR BLD: 5.8 %
HCT VFR BLD AUTO: 34 % (ref 34.8–46.1)
HDLC SERPL-MCNC: 76 MG/DL
HGB BLD-MCNC: 9.9 G/DL (ref 11.5–15.4)
IMM GRANULOCYTES # BLD AUTO: 0.02 THOUSAND/UL (ref 0–0.2)
IMM GRANULOCYTES NFR BLD AUTO: 0 % (ref 0–2)
LDLC SERPL CALC-MCNC: 91 MG/DL (ref 0–100)
LYMPHOCYTES # BLD AUTO: 2.79 THOUSANDS/ΜL (ref 0.6–4.47)
LYMPHOCYTES NFR BLD AUTO: 38 % (ref 14–44)
MCH RBC QN AUTO: 21.7 PG (ref 26.8–34.3)
MCHC RBC AUTO-ENTMCNC: 29.1 G/DL (ref 31.4–37.4)
MCV RBC AUTO: 75 FL (ref 82–98)
MONOCYTES # BLD AUTO: 0.37 THOUSAND/ΜL (ref 0.17–1.22)
MONOCYTES NFR BLD AUTO: 5 % (ref 4–12)
NEUTROPHILS # BLD AUTO: 3.9 THOUSANDS/ΜL (ref 1.85–7.62)
NEUTS SEG NFR BLD AUTO: 54 % (ref 43–75)
NONHDLC SERPL-MCNC: 117 MG/DL
NRBC BLD AUTO-RTO: 0 /100 WBCS
PLATELET # BLD AUTO: 498 THOUSANDS/UL (ref 149–390)
PMV BLD AUTO: 11.5 FL (ref 8.9–12.7)
POTASSIUM SERPL-SCNC: 4.2 MMOL/L (ref 3.5–5.3)
PROT SERPL-MCNC: 8 G/DL (ref 6.4–8.4)
RBC # BLD AUTO: 4.56 MILLION/UL (ref 3.81–5.12)
SODIUM SERPL-SCNC: 138 MMOL/L (ref 135–147)
TRIGL SERPL-MCNC: 130 MG/DL (ref ?–150)
TSH SERPL DL<=0.05 MIU/L-ACNC: 1.78 UIU/ML (ref 0.45–4.5)
WBC # BLD AUTO: 7.3 THOUSAND/UL (ref 4.31–10.16)

## 2025-01-14 PROCEDURE — 80053 COMPREHEN METABOLIC PANEL: CPT

## 2025-01-14 PROCEDURE — 83036 HEMOGLOBIN GLYCOSYLATED A1C: CPT

## 2025-01-14 PROCEDURE — 99385 PREV VISIT NEW AGE 18-39: CPT | Performed by: FAMILY MEDICINE

## 2025-01-14 PROCEDURE — 99214 OFFICE O/P EST MOD 30 MIN: CPT | Performed by: FAMILY MEDICINE

## 2025-01-14 PROCEDURE — 86803 HEPATITIS C AB TEST: CPT

## 2025-01-14 PROCEDURE — 36415 COLL VENOUS BLD VENIPUNCTURE: CPT

## 2025-01-14 PROCEDURE — 80061 LIPID PANEL: CPT

## 2025-01-14 PROCEDURE — 84443 ASSAY THYROID STIM HORMONE: CPT

## 2025-01-14 PROCEDURE — 85025 COMPLETE CBC W/AUTO DIFF WBC: CPT

## 2025-01-14 RX ORDER — SUMATRIPTAN 50 MG/1
50 TABLET, FILM COATED ORAL ONCE AS NEEDED
Qty: 9 TABLET | Refills: 0 | Status: SHIPPED | OUTPATIENT
Start: 2025-01-14 | End: 2025-01-15

## 2025-01-14 RX ORDER — IBUPROFEN 800 MG/1
800 TABLET, FILM COATED ORAL EVERY 8 HOURS PRN
Qty: 30 TABLET | Refills: 0 | Status: SHIPPED | OUTPATIENT
Start: 2025-01-14

## 2025-01-14 NOTE — PROGRESS NOTES
Adult Annual Physical  Name: Ana Suarez      : 1995      MRN: [id number]  Encounter Provider: Brenda Aleman MD  Encounter Date: 2025   Encounter department: [facility]    Assessment & Plan  Encounter for general adult medical examination with abnormal findings    Orders:    CBC and differential; Future    Comprehensive metabolic panel; Future    Hemoglobin A1C; Future    Lipid panel; Future    TSH, 3rd generation with Free T4 reflex; Future    Screening for diabetes mellitus    Orders:    Hemoglobin A1C; Future    Screening for cardiovascular condition    Orders:    Lipid panel; Future    Thyroid disorder screening    Orders:    TSH, 3rd generation with Free T4 reflex; Future    Need for hepatitis C screening test    Orders:    Hepatitis C Antibody; Future    Migraine without aura and without status migrainosus, not intractable  I discussed with the patient migraine without aura care instructions. Patient gets 2 episodes per month. Currently does not warrant maintenance therapy.   Check labs r/o anemia  Also recommend reducing screen time.    Discussed abortive therapy with her which includes initial hour of hydration with 1 L of water the onset of aura,Ibuprofen 800 mg immediately and can repeat after 1 hour for the headache. If the headache does not resolve with hydration and Ibuprofen she should take 1 fioricet tablet and repeat in 8 hours if needed.Avoid sumatriptan as she is not using any definitive contraception.     If this does not resolve her headache she should schedule an appointment to be seen. If she develops any focal neurologic deficits tingling, numbness, weakness or visual changes she should immediately contact the office or call 911 and go to the emergency room, she verbalized understanding.    Orders:    ibuprofen (MOTRIN) 800 mg tablet; Take 1 tablet (800 mg total) by mouth every 8 (eight) hours as needed for mild pain or headaches     butalbital-acetaminophen-caffeine (FIORICET,ESGIC) -40 mg per tablet; Take 1 tablet by mouth every 8 (eight) hours as needed for headaches      Immunizations and preventive care screenings were discussed with patient today. Appropriate education was printed on patient's after visit summary.    Counseling:  Alcohol/drug use: discussed moderation in alcohol intake, the recommendations for healthy alcohol use, and avoidance of illicit drug use.  Dental Health: discussed importance of regular tooth brushing, flossing, and dental visits.  Injury prevention: discussed safety/seat belts, safety helmets, smoke detectors, carbon monoxide detectors, and smoking near bedding or upholstery.  Sexual health: discussed sexually transmitted diseases, partner selection, use of condoms, avoidance of unintended pregnancy, and contraceptive alternatives.  Exercise: the importance of regular exercise/physical activity was discussed. Recommend exercise 3-5 times per week for at least 30 minutes.       Depression Screening and Follow-up Plan: Patient was screened for depression during today's encounter. They screened negative with a PHQ-2 score of 0.        History of Present Illness     Adult Annual Physical:  Patient presents for annual physical. States she has been having throbbing headaches for some months now, generally last for the whole day. Has not tried any OTC treatment.     Diet and Physical Activity:  - Diet/Nutrition: well balanced diet, consuming 3-5 servings of fruits/vegetables daily and limited junk food.  - Exercise: moderate cardiovascular exercise.    Depression Screening:  - PHQ-2 Score: 0    General Health:  - Sleep: sleeps well.  - Hearing: normal hearing bilateral ears.  - Vision: goes for regular eye exams and wears contacts.  - Dental: regular dental visits.    /GYN Health:  - Follows with GYN: yes.     Review of Systems   Constitutional:  Negative for fatigue and fever.   HENT:  Negative for congestion,  "facial swelling, mouth sores, rhinorrhea, sore throat and trouble swallowing.    Eyes:  Negative for pain and redness.   Respiratory:  Negative for cough, shortness of breath and wheezing.    Cardiovascular:  Negative for chest pain, palpitations and leg swelling.   Gastrointestinal:  Negative for abdominal pain, blood in stool, constipation, diarrhea and nausea.   Genitourinary:  Negative for dysuria, hematuria and urgency.   Musculoskeletal:  Negative for arthralgias, back pain and myalgias.   Skin:  Negative for rash and wound.   Neurological:  Positive for headaches. Negative for seizures and syncope.   Hematological:  Negative for adenopathy.   Psychiatric/Behavioral:  Negative for agitation and behavioral problems.      Medical History Reviewed by provider this encounter:  Tobacco  Allergies  Meds  Problems  Med Hx  Surg Hx  Fam Hx     .  No current outpatient medications on file prior to visit.     No current facility-administered medications on file prior to visit.      Social History     Tobacco Use    Smoking status: Former    Smokeless tobacco: Never   Vaping Use    Vaping status: Never Used   Substance and Sexual Activity    Alcohol use: Not Currently     Comment: occasioally    Drug use: No    Sexual activity: Yes     Partners: Male     Birth control/protection: None       Objective   /76 (BP Location: Right arm, Patient Position: Sitting, Cuff Size: Adult)   Pulse 79   Temp 97.7 °F (36.5 °C) (Tympanic)   Resp 16   Ht 5' 1\" (1.549 m)   Wt 68 kg (150 lb)   LMP 12/10/2024   SpO2 98%   BMI 28.34 kg/m²   Patient's last menstrual period was 12/10/2024.    Physical Exam  Vitals and nursing note reviewed.   Constitutional:       Appearance: Normal appearance. She is well-developed.   HENT:      Head: Normocephalic and atraumatic.      Right Ear: Tympanic membrane, ear canal and external ear normal.      Left Ear: Tympanic membrane, ear canal and external ear normal.      Nose: Nose normal. "      Mouth/Throat:      Pharynx: No oropharyngeal exudate.   Eyes:      General: No scleral icterus.        Right eye: No discharge.         Left eye: No discharge.      Conjunctiva/sclera: Conjunctivae normal.      Pupils: Pupils are equal, round, and reactive to light.   Neck:      Thyroid: No thyromegaly.   Cardiovascular:      Rate and Rhythm: Normal rate and regular rhythm.      Heart sounds: No murmur heard.     No gallop.   Pulmonary:      Effort: Pulmonary effort is normal. No respiratory distress.      Breath sounds: Normal breath sounds. No wheezing or rales.   Abdominal:      Palpations: Abdomen is soft.      Tenderness: There is no abdominal tenderness.   Musculoskeletal:         General: No tenderness or deformity.      Cervical back: Normal range of motion.      Right lower leg: No edema.      Left lower leg: No edema.   Lymphadenopathy:      Cervical: No cervical adenopathy.   Skin:     General: Skin is warm.      Capillary Refill: Capillary refill takes less than 2 seconds.      Coloration: Skin is pale.      Findings: No erythema or rash.   Neurological:      Mental Status: She is alert and oriented to person, place, and time.      Deep Tendon Reflexes: Reflexes normal.   Psychiatric:         Behavior: Behavior normal.         Thought Content: Thought content normal.         Judgment: Judgment normal.

## 2025-01-14 NOTE — PATIENT INSTRUCTIONS
"Patient Education     Routine physical for adults   The Basics   Written by the doctors and editors at [facility]   What is a physical? -- A physical is a routine visit, or \"check-up,\" with your doctor. You might also hear it called a \"wellness visit\" or \"preventive visit.\"  During each visit, the doctor will:   Ask about your physical and mental health   Ask about your habits, behaviors, and lifestyle   Do an exam   Give you vaccines if needed   Talk to you about any medicines you take   Give advice about your health   Answer your questions  Getting regular check-ups is an important part of taking care of your health. It can help your doctor find and treat any problems you have. But it's also important for preventing health problems.  A routine physical is different from a \"sick visit.\" A sick visit is when you see a doctor because of a health concern or problem. Since physicals are scheduled ahead of time, you can think about what you want to ask the doctor.  How often should I get a physical? -- It depends on your age and health. In general, for people age 21 years and older:   If you are younger than 50 years, you might be able to get a physical every 3 years.   If you are 50 years or older, your doctor might recommend a physical every year.  If you have an ongoing health condition, like diabetes or high blood pressure, your doctor will probably want to see you more often.  What happens during a physical? -- In general, each visit will include:   Physical exam - The doctor or nurse will check your height, weight, heart rate, and blood pressure. They will also look at your eyes and ears. They will ask about how you are feeling and whether you have any symptoms that bother you.   Medicines - It's a good idea to bring a list of all the medicines you take to each doctor visit. Your doctor will talk to you about your medicines and answer any questions. Tell them if you are having any side effects that bother you. You " "should also tell them if you are having trouble paying for any of your medicines.   Habits and behaviors - This includes:   Your diet   Your exercise habits   Whether you smoke, drink alcohol, or use drugs   Whether you are sexually active   Whether you feel safe at home  Your doctor will talk to you about things you can do to improve your health and lower your risk of health problems. They will also offer help and support. For example, if you want to quit smoking, they can give you advice and might prescribe medicines. If you want to improve your diet or get more physical activity, they can help you with this, too.   Lab tests, if needed - The tests you get will depend on your age and situation. For example, your doctor might want to check your:   Cholesterol   Blood sugar   Iron level   Vaccines - The recommended vaccines will depend on your age, health, and what vaccines you already had. Vaccines are very important because they can prevent certain serious or deadly infections.   Discussion of screening - \"Screening\" means checking for diseases or other health problems before they cause symptoms. Your doctor can recommend screening based on your age, risk, and preferences. This might include tests to check for:   Cancer, such as breast, prostate, cervical, ovarian, colorectal, prostate, lung, or skin cancer   Sexually transmitted infections, such as chlamydia and gonorrhea   Mental health conditions like depression and anxiety  Your doctor will talk to you about the different types of screening tests. They can help you decide which screenings to have. They can also explain what the results might mean.   Answering questions - The physical is a good time to ask the doctor or nurse questions about your health. If needed, they can refer you to other doctors or specialists, too.  Adults older than 65 years often need other care, too. As you get older, your doctor will talk to you about:   How to prevent falling at " home   Hearing or vision tests   Memory testing   How to take your medicines safely   Making sure that you have the help and support you need at home  All topics are updated as new evidence becomes available and our peer review process is complete.  This topic retrieved from [employer] on: May 02, 2024.  Topic 398862 Version 1.0  Release: 32.4.3 - C32.122  © 2024 UpToDate, Inc. and/or its affiliates. All rights reserved.  Consumer Information Use and Disclaimer   Disclaimer: This generalized information is a limited summary of diagnosis, treatment, and/or medication information. It is not meant to be comprehensive and should be used as a tool to help the user understand and/or assess potential diagnostic and treatment options. It does NOT include all information about conditions, treatments, medications, side effects, or risks that may apply to a specific patient. It is not intended to be medical advice or a substitute for the medical advice, diagnosis, or treatment of a health care provider based on the health care provider's examination and assessment of a patient's specific and unique circumstances. Patients must speak with a health care provider for complete information about their health, medical questions, and treatment options, including any risks or benefits regarding use of medications. This information does not endorse any treatments or medications as safe, effective, or approved for treating a specific patient. UpToDate, Inc. and its affiliates disclaim any warranty or liability relating to this information or the use thereof.The use of this information is governed by the Terms of Use, available at https://www.woltersCheersuwer.com/en/know/clinical-effectiveness-terms. 2024© UpToDate, Inc. and its affiliates and/or licensors. All rights reserved.  Copyright   © 2024 UpToDate, Inc. and/or its affiliates. All rights reserved.

## 2025-01-14 NOTE — TELEPHONE ENCOUNTER
Patient requesting to speak further with provider regarding the following results:    Provider:     Lab   [x] 1/14, advised patient some results are still in process  MRI  []  US  []  CT   []  X-Ray  []    Other   []

## 2025-01-15 LAB — HCV AB SER QL: NORMAL

## 2025-01-15 RX ORDER — BUTALBITAL, ACETAMINOPHEN AND CAFFEINE 50; 325; 40 MG/1; MG/1; MG/1
1 TABLET ORAL EVERY 8 HOURS PRN
Qty: 20 TABLET | Refills: 0 | Status: SHIPPED | OUTPATIENT
Start: 2025-01-15

## 2025-01-16 ENCOUNTER — TELEPHONE (OUTPATIENT)
Age: 30
End: 2025-01-16

## 2025-01-16 NOTE — TELEPHONE ENCOUNTER
PA FIORICET -40 mg SUBMITTED     to [facility]     via    []CMM-KEY:    [x]Surescripts-Case ID # [id number]   []Availity-Auth ID #  NDC #    []Faxed to plan   []Other website    []Phone call Case ID #      []PA sent as URGENT    All office notes, labs and other pertaining documents and studies sent. Clinical questions answered. Awaiting determination from insurance company.     Turnaround time for your insurance to make a decision on your Prior Authorization can take 7-21 business days.

## 2025-01-17 NOTE — TELEPHONE ENCOUNTER
PA FIORICET -40 mg DENIED    Reason:(Screenshot if applicable)        Message sent to office clinical pool Yes    Denial letter scanned into Media Yes    Appeal started No (Provider will need to decide if appeal is warranted and send clinical documentation to Prior Authorization Team for initiation.)    **Please follow up with your patient regarding denial and next steps**

## 2025-01-21 DIAGNOSIS — N76.0 BV (BACTERIAL VAGINOSIS): Primary | ICD-10-CM

## 2025-01-21 DIAGNOSIS — B96.89 BV (BACTERIAL VAGINOSIS): Primary | ICD-10-CM

## 2025-01-21 RX ORDER — METRONIDAZOLE 500 MG/1
500 TABLET ORAL EVERY 12 HOURS SCHEDULED
Qty: 14 TABLET | Refills: 0 | Status: SHIPPED | OUTPATIENT
Start: 2025-01-21 | End: 2025-01-28

## 2025-01-23 ENCOUNTER — RESULTS FOLLOW-UP (OUTPATIENT)
Dept: FAMILY MEDICINE CLINIC | Facility: CLINIC | Age: 30
End: 2025-01-23

## 2025-02-13 ENCOUNTER — TELEPHONE (OUTPATIENT)
Age: 30
End: 2025-02-13

## 2025-02-13 NOTE — TELEPHONE ENCOUNTER
Pt calling in stating that she would like to know for a friend, if someone delivers with vaginal herpes, if children in youth would need to be called. Pt was notified that the paitent themselves would need to speak about this with their PCP or OBGYN, as that would fall under their treatment plan, no further questions

## 2025-02-17 DIAGNOSIS — G43.009 MIGRAINE WITHOUT AURA AND WITHOUT STATUS MIGRAINOSUS, NOT INTRACTABLE: ICD-10-CM

## 2025-02-18 RX ORDER — IBUPROFEN 800 MG/1
800 TABLET, FILM COATED ORAL EVERY 8 HOURS PRN
Qty: 30 TABLET | Refills: 0 | OUTPATIENT
Start: 2025-02-18

## 2025-04-24 DIAGNOSIS — G43.009 MIGRAINE WITHOUT AURA AND WITHOUT STATUS MIGRAINOSUS, NOT INTRACTABLE: ICD-10-CM

## 2025-04-25 RX ORDER — IBUPROFEN 800 MG/1
800 TABLET, FILM COATED ORAL EVERY 8 HOURS PRN
Qty: 30 TABLET | Refills: 1 | Status: SHIPPED | OUTPATIENT
Start: 2025-04-25

## 2025-05-20 ENCOUNTER — NURSE TRIAGE (OUTPATIENT)
Dept: OBGYN CLINIC | Facility: CLINIC | Age: 30
End: 2025-05-20

## 2025-05-20 NOTE — TELEPHONE ENCOUNTER
Patient called to request a refill for their metroNIDAZOLE (FLAGYL) 500 mg tablet              Does the patient have enough for 3 days?   [] Yes   [x] No - Send as HP to POD

## 2025-05-21 DIAGNOSIS — B96.89 BV (BACTERIAL VAGINOSIS): Primary | ICD-10-CM

## 2025-05-21 DIAGNOSIS — N76.0 BV (BACTERIAL VAGINOSIS): Primary | ICD-10-CM

## 2025-05-21 RX ORDER — METRONIDAZOLE 500 MG/1
500 TABLET ORAL EVERY 12 HOURS SCHEDULED
Qty: 14 TABLET | Refills: 0 | Status: SHIPPED | OUTPATIENT
Start: 2025-05-21 | End: 2025-05-28

## 2025-05-21 NOTE — TELEPHONE ENCOUNTER
Patient called the RX Refill Line. Message is being forwarded to the office.     Patient is requesting a follow up regarding her medication request of metroNIDAZOLE (FLAGYL) 500 mg tablet. This medication is no longer active on her medication list, she states that she is symptomatic. Please review and if approved send to [id number] - [place], [place] - [address]    Please contact patient at [phone], as per request once completed. Patient stated it is okay to leave a detailed message in her voicemail.

## 2025-05-21 NOTE — TELEPHONE ENCOUNTER
"FOLLOW UP: n/a     REASON FOR CONVERSATION: Vaginitis    SYMPTOMS: fishy vaginal odor    OTHER: Hx of BV x4 in the last 12 months. States she is prone to it after intercourse mostly. Reviewed genital hygiene and recommended use of probiotics. Routing to provider for advice if medication can be sent or needs to be seen.     DISPOSITION: Discuss with Provider and Call Back Patient (overriding See Within 3 Days in Office)      Reason for Disposition   Vaginal odor (bad smell) not improved > 3 days following Care Advice    Answer Assessment - Initial Assessment Questions  1. SYMPTOM: \"What's the main symptom you're concerned about?\" (e.g., pain, itching, dryness)      Vaginal odor, fishy  2. LOCATION: \"Where is the  odor located?\" (e.g., inside/outside, left/right)      vaginally  3. ONSET: \"When did the  symptoms  start?\"      3 days ago  4. PAIN: \"Is there any pain?\" If Yes, ask: \"How bad is it?\" (Scale: 1-10; mild, moderate, severe)      denies  5. ITCHING: \"Is there any itching?\" If Yes, ask: \"How bad is it?\" (Scale: 1-10; mild, moderate, severe)      denies  6. CAUSE: \"What do you think is causing the discharge?\" \"Have you had the same problem before?\" \"What happened then?\"      denies  7. OTHER SYMPTOMS: \"Do you have any other symptoms?\" (e.g., fever, itching, vaginal bleeding, pain with urination, injury to genital area, vaginal foreign body)      denies  8. PREGNANCY: \"Is there any chance you are pregnant?\" \"When was your last menstrual period?\"      A few days ago    Protocols used: Vaginal Symptoms-Adult-[place]    "

## 2025-05-21 NOTE — TELEPHONE ENCOUNTER
Advised patient rx sent and provider's recs. Please call to schedule if symptoms recur. Patient verbalized understanding.

## 2025-05-21 NOTE — TELEPHONE ENCOUNTER
Please call patient and let her know I sent in a course of the oral Flagyl antibiotic for possible BV to pharmacy this evening.  She will take 1 pill twice daily x 7 days and avoid alcohol with use.  With the recurrence history I would advise if symptoms happen again to please schedule an appointment in the office.  Thank

## 2025-05-26 ENCOUNTER — HOSPITAL ENCOUNTER (OUTPATIENT)
Dept: GENERAL RADIOLOGY | Age: 30
Discharge: HOME | End: 2025-05-28
Payer: COMMERCIAL

## 2025-05-26 ENCOUNTER — TRANSCRIBE ORDERS (OUTPATIENT)
Dept: GENERAL RADIOLOGY | Age: 30
End: 2025-05-26

## 2025-05-26 DIAGNOSIS — R20.2 PARESTHESIA: ICD-10-CM

## 2025-05-26 DIAGNOSIS — R60.9 SWELLING: ICD-10-CM

## 2025-05-26 DIAGNOSIS — M54.9 BACK PAIN, UNSPECIFIED BACK LOCATION, UNSPECIFIED BACK PAIN LATERALITY, UNSPECIFIED CHRONICITY: Primary | ICD-10-CM

## 2025-05-26 DIAGNOSIS — M54.9 BACK PAIN, UNSPECIFIED BACK LOCATION, UNSPECIFIED BACK PAIN LATERALITY, UNSPECIFIED CHRONICITY: ICD-10-CM

## 2025-05-26 PROCEDURE — 72100 X-RAY EXAM L-S SPINE 2/3 VWS: CPT

## 2025-05-26 PROCEDURE — 72072 X-RAY EXAM THORAC SPINE 3VWS: CPT

## 2025-07-01 ENCOUNTER — TELEPHONE (OUTPATIENT)
Age: 30
End: 2025-07-01

## 2025-07-01 NOTE — TELEPHONE ENCOUNTER
Patient is requesting a call regarding a mammogram an d can be reached at [phone] . Okay to leave a message.

## 2025-07-02 NOTE — TELEPHONE ENCOUNTER
Called and spoke with patient.  She said she has never had a mammogram and denies any breast problems such as pain or lumps.  She said her insurance informed her she is due for one.  She was informed that with no family history or breast problems, routine screening mammograms start at age 40.

## 2025-08-07 ENCOUNTER — NURSE TRIAGE (OUTPATIENT)
Age: 30
End: 2025-08-07

## (undated) DEVICE — SUTURE MCRYL SZ 0 L36IN ABSRB VLT L48MM CTX 1/2 CIR Y398H

## (undated) DEVICE — SUTURE VCRL 3-0 L36IN ABSRB VLT CT-1 L36MM 1/2 CIR J344H

## (undated) DEVICE — TRAY SPNL 24GA L4IN PENCAN PNCL PNT NDL 0.75% BIPIVCAIN W/

## (undated) DEVICE — 450 ML BOTTLE OF 0.05% CHLORHEXIDINE GLUCONATE IN 99.95% STERILE WATER FOR IRRIGATION, USP AND APPLICATOR.: Brand: IRRISEPT ANTIMICROBIAL WOUND LAVAGE

## (undated) DEVICE — Z DISC NO SUB GLOVE SURG SZ 65 L12IN FNGR THK87MIL WHT LTX FREE

## (undated) DEVICE — CYSTO/BLADDER IRRIGATION SET, REGULATING CLAMP

## (undated) DEVICE — COVER LT HNDL BLU PLAS

## (undated) DEVICE — SUTURE PLN GUT SZ 3-0 L27IN ABSRB YELLOWISH TAN L36MM CT-1 842H

## (undated) DEVICE — Device

## (undated) DEVICE — ELECTRODE ES L3IN S STL BLDE INSUL DISP VALLEYLAB EDGE

## (undated) DEVICE — SUTURE ABSORBABLE MONOFILAMENT 0 TP1 60 IN VIO PDS + PDP991G

## (undated) DEVICE — PREVENA INCISION MANAGEMENT SYSTEM- PEEL & PLACE DRESSING: Brand: PREVENA™ PEEL & PLACE™